# Patient Record
Sex: MALE | Race: WHITE | NOT HISPANIC OR LATINO | URBAN - NONMETROPOLITAN AREA
[De-identification: names, ages, dates, MRNs, and addresses within clinical notes are randomized per-mention and may not be internally consistent; named-entity substitution may affect disease eponyms.]

---

## 2024-09-24 ENCOUNTER — PREP FOR SURGERY (OUTPATIENT)
Dept: OTHER | Facility: HOSPITAL | Age: 69
End: 2024-09-24
Payer: MEDICARE

## 2024-09-24 ENCOUNTER — HOSPITAL ENCOUNTER (INPATIENT)
Facility: HOSPITAL | Age: 69
DRG: 065 | End: 2024-09-24
Attending: INTERNAL MEDICINE | Admitting: INTERNAL MEDICINE
Payer: MEDICARE

## 2024-09-24 PROBLEM — I63.9 CVA (CEREBRAL VASCULAR ACCIDENT): Status: ACTIVE | Noted: 2024-09-24

## 2024-09-24 LAB
GLUCOSE BLDC GLUCOMTR-MCNC: 116 MG/DL (ref 70–130)
GLUCOSE BLDC GLUCOMTR-MCNC: 197 MG/DL (ref 70–130)

## 2024-09-24 PROCEDURE — 99222 1ST HOSP IP/OBS MODERATE 55: CPT | Performed by: INTERNAL MEDICINE

## 2024-09-24 PROCEDURE — 82948 REAGENT STRIP/BLOOD GLUCOSE: CPT

## 2024-09-24 RX ORDER — AMOXICILLIN 250 MG
2 CAPSULE ORAL 2 TIMES DAILY
Status: DISCONTINUED | OUTPATIENT
Start: 2024-09-24 | End: 2024-09-27

## 2024-09-24 RX ORDER — SPIRONOLACTONE 25 MG/1
12.5 TABLET ORAL DAILY
Status: DISPENSED | OUTPATIENT
Start: 2024-09-25

## 2024-09-24 RX ORDER — NICOTINE 21 MG/24HR
1 PATCH, TRANSDERMAL 24 HOURS TRANSDERMAL
Status: CANCELLED | OUTPATIENT
Start: 2024-09-25

## 2024-09-24 RX ORDER — METOPROLOL TARTRATE 50 MG
50 TABLET ORAL EVERY 12 HOURS SCHEDULED
Status: CANCELLED | OUTPATIENT
Start: 2024-09-24

## 2024-09-24 RX ORDER — ATORVASTATIN CALCIUM 40 MG/1
40 TABLET, FILM COATED ORAL DAILY
Status: DISPENSED | OUTPATIENT
Start: 2024-09-25

## 2024-09-24 RX ORDER — AMOXICILLIN 250 MG
2 CAPSULE ORAL 2 TIMES DAILY
Status: CANCELLED | OUTPATIENT
Start: 2024-09-24

## 2024-09-24 RX ORDER — LANOLIN ALCOHOL/MO/W.PET/CERES
1000 CREAM (GRAM) TOPICAL DAILY
Status: DISPENSED | OUTPATIENT
Start: 2024-09-25

## 2024-09-24 RX ORDER — METOPROLOL TARTRATE 50 MG
50 TABLET ORAL EVERY 12 HOURS SCHEDULED
Status: DISPENSED | OUTPATIENT
Start: 2024-09-24

## 2024-09-24 RX ORDER — SPIRONOLACTONE 25 MG/1
12.5 TABLET ORAL DAILY
Status: CANCELLED | OUTPATIENT
Start: 2024-09-25

## 2024-09-24 RX ORDER — ACETAMINOPHEN 325 MG/1
650 TABLET ORAL EVERY 4 HOURS PRN
Status: DISPENSED | OUTPATIENT
Start: 2024-09-24

## 2024-09-24 RX ORDER — ASPIRIN 81 MG/1
81 TABLET ORAL DAILY
Status: CANCELLED | OUTPATIENT
Start: 2024-09-25

## 2024-09-24 RX ORDER — ATORVASTATIN CALCIUM 40 MG/1
40 TABLET, FILM COATED ORAL DAILY
Status: CANCELLED | OUTPATIENT
Start: 2024-09-25

## 2024-09-24 RX ORDER — ASPIRIN 81 MG/1
81 TABLET ORAL DAILY
Status: DISPENSED | OUTPATIENT
Start: 2024-09-25

## 2024-09-24 RX ORDER — LANOLIN ALCOHOL/MO/W.PET/CERES
1000 CREAM (GRAM) TOPICAL DAILY
Status: CANCELLED | OUTPATIENT
Start: 2024-09-25

## 2024-09-24 RX ORDER — NICOTINE 21 MG/24HR
1 PATCH, TRANSDERMAL 24 HOURS TRANSDERMAL
Status: DISPENSED | OUTPATIENT
Start: 2024-09-25

## 2024-09-24 RX ORDER — ACETAMINOPHEN 325 MG/1
650 TABLET ORAL EVERY 4 HOURS PRN
Status: CANCELLED | OUTPATIENT
Start: 2024-09-24

## 2024-09-24 RX ADMIN — APIXABAN 5 MG: 5 TABLET, FILM COATED ORAL at 20:57

## 2024-09-24 RX ADMIN — METOPROLOL TARTRATE 50 MG: 50 TABLET, FILM COATED ORAL at 20:57

## 2024-09-24 RX ADMIN — SENNOSIDES AND DOCUSATE SODIUM 2 TABLET: 50; 8.6 TABLET ORAL at 20:57

## 2024-09-24 NOTE — H&P
[facility] HOSPITALIST HISTORY AND PHYSICAL    Patient Identification:  Name:  Anshul Andrews  Age:  69 y.o.  Sex:  male  :  1955  MRN:  [id number]   Visit Number:  [id number]  Room number:  104/2S  Primary Care Physician:  Provider, No Known     Subjective     2024   Chief complaint: Follow-up on CVA      History of presenting illness:  69 y.o. male  This pt is seen today for post admission physician evaluation to the inpatient rehabilitation facility.  Patient 69-year-old male who was apparently working at his job when he suddenly got a left facial droop and quit talking, he had a left-sided weakness and was taken immediately to the hospital.  He reportedly got TNK and subsequent thrombectomy.  According to the his relative that is with him he was completely flaccid on his left side but this again coming back by the next morning and has returned overall almost back to normal at this time.  Patient was noted to have a right MCA CVA.  This was thought to be cardioembolic.  He did have some left atrial thrombus noted.  He was started on Eliquis therapy.  He was seen by speech therapy and thought to be okay for a diet.  He is currently eating [employer] and tolerating this.  Patient continued to progress medically.  Physical therapy and Occupational therapy evaluations were completed with recommended acute inpatient rehabilitation referral for continued functional mobility intervention and reeducation.  Acute rehab referral ordered for continued medical monitoring and management post hospitalization, lab monitoring, pain mgt needs, bowel/bladder care with new medication education, skin monitoring and breakdown prevention along with ongoing medical comorbidities that require ongoing care.    The preadmission mini-FIM score as assessed by the referring facility as follows: Eating 5, grooming 4, bathing 4, upper body dressing 4, lower body dressing 4, toileting 4, transfers 3, toilet transfer 3,  "walking 3, bladder management 4, bowel management 4, comprehension 7, expression 7, social interaction 7, problem-solving 7, memory 7.    ---------------------------------------------------------------------------------------------------------------------   Review of Systems:  General: No fever or chills  HEENT: No vision or hearing changes from CVA  Heart: Denies chest pain no palpitation  Lungs: He does get some shortness of breath at times, no current cough or congestion  Abdomen: No abdominal pain, he has had some bowel movements at the [facility] facility  : No trouble with urination  Musculoskeletal: He feels like he is still little \"clumsy\" with his left hand but otherwise strength he thinks is doing okay  Neuro: Denies any paresthesia  Skin: No known skin break  ---------------------------------------------------------------------------------------------------------------------   PMH:  Apparently new diagnosis of diabetes, he may have known he did have some hypertension but was not treated.  He was diagnosed with paroxysmal atrial fibrillation at the [facility], he does have these left atrial thrombi.  He is currently now on Eliquis therapy for that.  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    No known medical allergy    Social history: , he works for a [employer] company, he does smoke about 2 packs a day but is wanting to quit, he socially drinks but not even consider 1 drink a week.    Family history: Father fatal MI at 60, mother  of cancer      Social History     Socioeconomic History    Marital status: Single     ---------------------------------------------------------------------------------------------------------------------   Allergies:  Patient has no allergy information on " record.  ---------------------------------------------------------------------------------------------------------------------   Medications recommended from [facility] reviewed    Prior to Admission Medications       None          Objective     Vital Signs:  Temp:  [98.1 °F (36.7 °C)] 98.1 °F (36.7 °C)  Heart Rate:  [80] 80  Resp:  [18] 18  BP: (149)/(87) 149/87    Mean Arterial Pressure (Non-Invasive) for the past 24 hrs (Last 3 readings):   Noninvasive MAP (mmHg)   09/24/24 1444 105     SpO2:  [95 %] 95 %  on   ;   Device (Oxygen Therapy): room air  Body mass index is 29.47 kg/m².    Wt Readings from Last 3 Encounters:   09/24/24 116 kg (255 lb)      ---------------------------------------------------------------------------------------------------------------------     Physical exam:  Constitutional: 98.1, 80, 18, 149/87, room air saturation 95%.  He is in no distress.  Can speak in full sentence without difficulty, speech is understandable.  HEENT: Pupils equal approximately 3 to 4 mm and reactive extraocular movements are intact hearing intact  Neck:   No carotid bruits, neck is supple  Cardiovascular: Regular rate and rhythm no murmur rub or gallop  Pulmonary/Chest: Bilateral breath sounds mildly diminished bases otherwise clear no rhonchi rales wheezing  Abdominal:  .  Soft, benign  Musculoskeletal:  strength slightly less than the left than the right but overall good he can lift both arms above his head.  Lower extremity strength is also almost symmetric possibly slightly less strong in the left than the right, no edema is noted.  No joint effusions.  Neurological: Finger-to-nose is intact, he can oppose all fingers to his thumb on both hands.  Alert and oriented.  Skin: Warm and dry  Peripheral vascular: No clubbing or cyanosis  Genitourinary: No Gtz cath  -------------------------------------------------------------------------------------          Last echocardiogram: Reportedly SHARYN showed  severe left atrial enlargement EF of 30% multiple thrombi no PFO    --------------------------------------------------------------------------------------------------------------------  Labs: Latest labs from up there BMP glucose 116 creatinine 1.34 otherwise unremarkable, he did have also hemoglobin 18 hematocrit 55 white count of 12.4 and platelet count 279            ----------------------------------------------------------------------------------------------------------------------        Assessment & Plan    Acute right MCA CVA with some minimal left residual at this time.  Patient did have some petechial hemorrhage into this by MRI however repeat CT did not show any change in the patient was started on Eliquis.  Patient has been admitted to the acute inpatient rehab facility to undergo intensive occupational and physical therapy he will also have a speech evaluation.  Patient undergo PT 1 to 2 hours a day 5 to 6 days a week for therapeutic exercise, range of motion, endurance, gait training, safety, stairs, strengthening.  Patient undergo occupational therapy 1 to 2 hours a day 5 to 6 days a week for dressing, ADLs, feeding, home skills, safety and toileting techniques.  Patient if needed will undergo speech and language pathology 30 to 60 minutes a day 3 to 5 days a week for communication, expression, and any dysphagia/aspiration needs.  Expected functional improvement for safe discharge will be for the patient to be able to safely navigate home and community territory without injury or falls.  Be able to safely perform activities of daily living using adaptive equipment for improved functional mobility.  Be independent and safe with bowel and bladder function.  Be able to safely consume food and fluids without signs of aspiration.  Anticipate length of stay 14 days although based on the above assessment may require less.  Patient was previously independent.  His goal is to return to that level of function.   Continue Eliquis, ASA and Lipitor for further stroke prevention    Atrial fibrillation, paroxysmal, pulse is regular at this time, continue Toprol for rate control if he does go into A-fib, continue Eliquis for further stroke prevention.    Tobacco use, counseled to quit which she is going to try, NicoDerm patch ordered.    Diabetes, check A1c, monitor glucometers, continue the Jardiance at this time.    Hypertension, monitor adjust medication as needed.    HFrEF, appears compensated, continue beta-blocker, Jardiance, Aldactone.  Patient reportedly seen by cardiology at [facility] however with this new reduced EF, he may require cardiac catheterization at some point in the near future will need referral to cardiology as an outpatient.    Mildly elevated creatinine, follow recheck in AM.    Polycythemia, possibly secondary, if he were to have primary polycythemia this could raise his risk of stroke, this will need to be followed with him quitting smoking.    Cardiac thrombi, continue Eliquis    DVT prophylaxis, Eliquis will serve for this as well      VTE Prophylaxis:   [unfilled]    Falls Risk Assessment patient appears to have some incoordination minimal and some minimal weakness, status post CVA, high risk of falling        Valeriy Brandon MD  [facility]ist  09/24/24  15:34 EDT

## 2024-09-24 NOTE — PLAN OF CARE
Goal Outcome Evaluation:  Plan of Care Reviewed With: patient        Progress: no change  Outcome Evaluation: NEW ADMIT TO REHAB; FALL SAFETY INTERVENTION LEVELS EDUCATION GIVEN; PLAN OF CARE INITIATED; BED/CHAIR ALARM NOTED; WILL MONITOR

## 2024-09-24 NOTE — PLAN OF CARE
Goal Outcome Evaluation:         New admit   Initiate plan of care.

## 2024-09-25 LAB
ALBUMIN SERPL-MCNC: 3.8 G/DL (ref 3.5–5.2)
ALBUMIN/GLOB SERPL: 1.3 G/DL
ALP SERPL-CCNC: 128 U/L (ref 39–117)
ALT SERPL W P-5'-P-CCNC: 44 U/L (ref 1–41)
ANION GAP SERPL CALCULATED.3IONS-SCNC: 10.5 MMOL/L (ref 5–15)
AST SERPL-CCNC: 34 U/L (ref 1–40)
BASOPHILS # BLD AUTO: 0.1 10*3/MM3 (ref 0–0.2)
BASOPHILS NFR BLD AUTO: 0.7 % (ref 0–1.5)
BILIRUB SERPL-MCNC: 0.6 MG/DL (ref 0–1.2)
BUN SERPL-MCNC: 32 MG/DL (ref 8–23)
BUN/CREAT SERPL: 27.4 (ref 7–25)
CALCIUM SPEC-SCNC: 9.2 MG/DL (ref 8.6–10.5)
CHLORIDE SERPL-SCNC: 106 MMOL/L (ref 98–107)
CO2 SERPL-SCNC: 23.5 MMOL/L (ref 22–29)
CREAT SERPL-MCNC: 1.17 MG/DL (ref 0.76–1.27)
DEPRECATED RDW RBC AUTO: 47.5 FL (ref 37–54)
EGFRCR SERPLBLD CKD-EPI 2021: 67.9 ML/MIN/1.73
EOSINOPHIL # BLD AUTO: 0.23 10*3/MM3 (ref 0–0.4)
EOSINOPHIL NFR BLD AUTO: 1.7 % (ref 0.3–6.2)
ERYTHROCYTE [DISTWIDTH] IN BLOOD BY AUTOMATED COUNT: 15.9 % (ref 12.3–15.4)
GLOBULIN UR ELPH-MCNC: 3 GM/DL
GLUCOSE BLDC GLUCOMTR-MCNC: 105 MG/DL (ref 70–130)
GLUCOSE BLDC GLUCOMTR-MCNC: 121 MG/DL (ref 70–130)
GLUCOSE BLDC GLUCOMTR-MCNC: 90 MG/DL (ref 70–130)
GLUCOSE BLDC GLUCOMTR-MCNC: 96 MG/DL (ref 70–130)
GLUCOSE SERPL-MCNC: 91 MG/DL (ref 65–99)
HBA1C MFR BLD: 6.2 % (ref 4.8–5.6)
HCT VFR BLD AUTO: 54.1 % (ref 37.5–51)
HGB BLD-MCNC: 17 G/DL (ref 13–17.7)
IMM GRANULOCYTES # BLD AUTO: 0.08 10*3/MM3 (ref 0–0.05)
IMM GRANULOCYTES NFR BLD AUTO: 0.6 % (ref 0–0.5)
LYMPHOCYTES # BLD AUTO: 3.16 10*3/MM3 (ref 0.7–3.1)
LYMPHOCYTES NFR BLD AUTO: 23.5 % (ref 19.6–45.3)
MCH RBC QN AUTO: 26.9 PG (ref 26.6–33)
MCHC RBC AUTO-ENTMCNC: 31.4 G/DL (ref 31.5–35.7)
MCV RBC AUTO: 85.5 FL (ref 79–97)
MONOCYTES # BLD AUTO: 1.07 10*3/MM3 (ref 0.1–0.9)
MONOCYTES NFR BLD AUTO: 8 % (ref 5–12)
NEUTROPHILS NFR BLD AUTO: 65.5 % (ref 42.7–76)
NEUTROPHILS NFR BLD AUTO: 8.78 10*3/MM3 (ref 1.7–7)
NRBC BLD AUTO-RTO: 0 /100 WBC (ref 0–0.2)
PLATELET # BLD AUTO: 291 10*3/MM3 (ref 140–450)
PMV BLD AUTO: 10.5 FL (ref 6–12)
POTASSIUM SERPL-SCNC: 4.6 MMOL/L (ref 3.5–5.2)
PROT SERPL-MCNC: 6.8 G/DL (ref 6–8.5)
RBC # BLD AUTO: 6.33 10*6/MM3 (ref 4.14–5.8)
SODIUM SERPL-SCNC: 140 MMOL/L (ref 136–145)
WBC NRBC COR # BLD AUTO: 13.42 10*3/MM3 (ref 3.4–10.8)

## 2024-09-25 PROCEDURE — 97110 THERAPEUTIC EXERCISES: CPT

## 2024-09-25 PROCEDURE — 92610 EVALUATE SWALLOWING FUNCTION: CPT

## 2024-09-25 PROCEDURE — 97161 PT EVAL LOW COMPLEX 20 MIN: CPT

## 2024-09-25 PROCEDURE — 97116 GAIT TRAINING THERAPY: CPT

## 2024-09-25 PROCEDURE — 97530 THERAPEUTIC ACTIVITIES: CPT

## 2024-09-25 PROCEDURE — 92523 SPEECH SOUND LANG COMPREHEN: CPT

## 2024-09-25 PROCEDURE — 85025 COMPLETE CBC W/AUTO DIFF WBC: CPT | Performed by: INTERNAL MEDICINE

## 2024-09-25 PROCEDURE — 82948 REAGENT STRIP/BLOOD GLUCOSE: CPT

## 2024-09-25 PROCEDURE — 83036 HEMOGLOBIN GLYCOSYLATED A1C: CPT | Performed by: INTERNAL MEDICINE

## 2024-09-25 PROCEDURE — 80053 COMPREHEN METABOLIC PANEL: CPT | Performed by: INTERNAL MEDICINE

## 2024-09-25 PROCEDURE — 97535 SELF CARE MNGMENT TRAINING: CPT

## 2024-09-25 PROCEDURE — 97167 OT EVAL HIGH COMPLEX 60 MIN: CPT

## 2024-09-25 RX ADMIN — SPIRONOLACTONE 12.5 MG: 25 TABLET ORAL at 09:03

## 2024-09-25 RX ADMIN — ASPIRIN 81 MG: 81 TABLET, COATED ORAL at 09:04

## 2024-09-25 RX ADMIN — EMPAGLIFLOZIN 10 MG: 10 TABLET, FILM COATED ORAL at 09:02

## 2024-09-25 RX ADMIN — Medication 1000 MCG: at 09:02

## 2024-09-25 RX ADMIN — APIXABAN 5 MG: 5 TABLET, FILM COATED ORAL at 20:58

## 2024-09-25 RX ADMIN — METOPROLOL TARTRATE 50 MG: 50 TABLET, FILM COATED ORAL at 09:02

## 2024-09-25 RX ADMIN — SENNOSIDES AND DOCUSATE SODIUM 2 TABLET: 50; 8.6 TABLET ORAL at 09:03

## 2024-09-25 RX ADMIN — SENNOSIDES AND DOCUSATE SODIUM 2 TABLET: 50; 8.6 TABLET ORAL at 20:58

## 2024-09-25 RX ADMIN — APIXABAN 5 MG: 5 TABLET, FILM COATED ORAL at 09:03

## 2024-09-25 RX ADMIN — NICOTINE 1 PATCH: 21 PATCH TRANSDERMAL at 09:03

## 2024-09-25 RX ADMIN — ATORVASTATIN CALCIUM 40 MG: 40 TABLET, FILM COATED ORAL at 09:03

## 2024-09-25 NOTE — PROGRESS NOTES
Occupational Therapy: Individual: 100 minutes.    Physical Therapy:    Speech Language Pathology:    Signed by: Melisa Alaniz OT

## 2024-09-25 NOTE — SIGNIFICANT NOTE
09/25/24 1448   Living Environment   People in Home alone   Current Living Arrangements home   Potentially Unsafe Housing Conditions none   In the past 12 months has the electric, gas, oil, or water company threatened to shut off services in your home? No   Primary Care Provided by self   Provides Primary Care For no one   Family Caregiver if Needed child(karen), adult   Family Caregiver Names Son Yogesh Andrews   Quality of Family Relationships helpful;involved;supportive   Able to Return to Prior Arrangements no   Living Arrangement Comments Pt is a 70 Y/O male admitted to physical rehab on 9-24-24 with dx of CVA from [facility].  Spoke to pt who states being  and living alone in [place], [place].  Pt worked full-time and traveled a lot for his job.  Pt has one son Yogesh Andrews who lives in [place], [place].  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  Pt asked about being eligible for Social Security disability.  Informed pt he would need to contact Social Security Administration or go to an office to ask this question.  Informed pt there is a Social Security office in [place], [place] and SS can provide a phone number for this office if needed.  Pt says he has Medicare A&B and a supplement insurance.  Pt does not have prescription insurance.  Pt did not have a PCP prior to admission.  Pt plans to establish care with a PCP in [place] and he is agreeable to  [facility].  Pt does not have POA or living will.  Pt [facility].  Pt was independent with ADL's, mobility, driving and working prior to admission.   Resource/Environmental Concerns   Resource/Environmental Concerns none   Transportation Concerns none   Transition Planning   Patient/Family Anticipates Transition to home with family   Patient/Family Anticipated Services at Transition   (To be recommended closer to discharge date.)   Transportation Anticipated family or  friend will provide   Discharge Needs Assessment (IRF)   Current Outpatient/Agency/Support Group   (Pt did not receive home health or outpatient therapy prior to admission.)   Equipment Currently Used at Home none   Discharge Coordination/Progress Pt plans to go to son Yogesh Andrews's home at discharge.  Pt says he will be selling his home in [place] and he will be living in [place], [place].  Team conference will be held on 9-26-24.  SS will follow and assist with discharge planning.

## 2024-09-25 NOTE — PROGRESS NOTES
Inpatient Rehabilitation Functional Measures Assessment and Plan of Care    Plan of Care  Mobility    [PT] Bed/Chair/Wheelchair(Active)  Current Status(09/25/2024): SBA  Weekly Goal(09/25/2024): n/a  Discharge Goal: independent    [PT] Bed Mobility(Active)  Current Status(01/01/1753):  Weekly Goal(01/01/1753):  Discharge Goal:    [PT] Walk(Active)  Current Status(09/25/2024): 300 ft, no assistive device, SBA  Weekly Goal(09/25/2024): n/a  Discharge Goal: 300 ft, mod independent, no assistive device    [PT] Stairs(Active)  Current Status(09/25/2024): TBA  Weekly Goal(09/25/2024): n/a  Discharge Goal: 12 stairs, 2 handrails, SBA    Functional Measures  IRISH Eating:  IRISH Grooming:  IRISH Bathing:  IRISH Upper Body Dressing:  IRISH Lower Body Dressing:  IRISH Toileting:    IRISH Bladder Management  Level of Assistance:  Frequency/Number of Accidents this Shift:    IRISH Bowel Management  Level of Assistance:  Frequency/Number of Accidents this Shift:    IRISH Bed/Chair/Wheelchair Transfer:  Bed/chair/wheelchair Transfer Score = 5.  Patient is supervision/set-up for transferring to and from the  bed/chair/wheelchair, requiring: Stand by assistance. No assistive devices were  required.  IRISH Toilet Transfer:  Toilet Transfer Score = 5.  Patient is supervision/set-up  for transferring to and from the toilet/commode, requiring: Stand by assistance.  Patient requires the following assistive device(s): Grab bars. Safety frame/over  the toilet.  IRISH Tub/Shower Transfer:  Activity was not observed.    Previously Documented Mode of Locomotion at Discharge:  IRISH Expected Mode of Locomotion at Discharge: The expected mode of most  frequently used locomotion, at discharge, is expected to be walking.  IRISH Walk/Wheelchair:  WHEELCHAIR OBSERVATION   Wheelchair did not occur.    WALK OBSERVATION   Walk Distance Scale = 3.  Distance walked is greater than 150 feet. Walk Score  = 5.  Patient requires supervision or set up for walking. Stand by  assistance.  Patient walked a distance of  300 feet. No assistive devices were required.  IRISH Stairs:  Stairs did not occur.    IRISH Comprehension:  IRISH Expression:  IRISH Social Interaction:  IRISH Problem Solving:  IRISH Memory:    Therapy Mode Minutes  Occupational Therapy:  Physical Therapy: Individual: 90 minutes.  Speech Language Pathology:    Discharge Functional Goals:  Bed, Chair, Wheelchair Transfers: 7  Mode of Locomotion: W  Walk: 6  Wheelchair: 1  Stairs: 5    Signed by: Angie Cullen, Physical Therapist

## 2024-09-25 NOTE — PROGRESS NOTES
Patient Assessment Instrument  Quality Indicators - Admission FY 2023    Section A. Ethnicity/ Race/Language  Ethnicity:  Race:  Preferred Language:    Section A. Transportation      Section B. Hearing and Vision        Section B. Health Literacy        Section C. Cognitive Patterns      Section C. Signs and Symptoms of Delirium (from CAM)      Section D. Mood      Section D. Social Isolation      Section [id number]. Prior Functioning      Section EU5089. Prior Device Use      Section NR8806. Self Care Performance      Section [id number]. Self Care Discharge Goals      Section TA9470. Mobility Performance     Roll Left and Right: Patient completed the activities by themself with no  assistance from a helper.   Sit to Lying: Patient completed the activities by themself with no assistance  from a helper.   Lying to Sitting on Side of Bed: Patient completed the activities by themself  with no assistance from a helper.   Sit to Stand: [place] provides verbal cues and/or touching/steadying and/or  contact guard assistance as patient completes activity. Assistance may be  provided throughout the activity or intermittently.   Chair/Bed to Chair Transfer: [place] provides verbal cues and/or  touching/steadying and/or contact guard assistance as patient completes  activity. Assistance may be provided throughout the activity or intermittently.   Toilet Transfer [place] provides verbal cues and/or touching/steadying and/or  contact guard assistance as patient completes activity. Assistance may be  provided throughout the activity or intermittently.   Car Transfer: [place] provides verbal cues and/or touching/steadying and/or  contact guard assistance as patient completes activity. Assistance may be  provided throughout the activity or intermittently.   Walk 10 Feet:   [place] provides verbal cues and/or touching/steadying and/or  contact guard assistance as patient completes activity. Assistance may be  provided throughout the activity or  intermittently.  Walk 50 Feet with 2 Turns:   [place] provides verbal cues and/or  touching/steadying and/or contact guard assistance as patient completes  activity. Assistance may be provided throughout the activity or intermittently.  Walk 150 Feet:   [place] provides verbal cues and/or touching/steadying and/or  contact guard assistance as patient completes activity. Assistance may be  provided throughout the activity or intermittently.  Walking 10 Feet on Uneven Surfaces:   [place] provides verbal cues and/or  touching/steadying and/or contact guard assistance as patient completes  activity. Assistance may be provided throughout the activity or intermittently.  1 Step Over Curb or Up/Down Stair:   [place] provides verbal cues and/or  touching/steadying and/or contact guard assistance as patient completes  activity. Assistance may be provided throughout the activity or intermittently.  4 Steps Up and Down, With/Without Rail:   [place] provides verbal cues and/or  touching/steadying and/or contact guard assistance as patient completes  activity. Assistance may be provided throughout the activity or intermittently.  12 Steps Up and Down, With/Without Rail:   [place] provides verbal cues and/or  touching/steadying and/or contact guard assistance as patient completes  activity. Assistance may be provided throughout the activity or intermittently.  Picking up an Object:   [place] provides verbal cues and/or touching/steadying  and/or contact guard assistance as patient completes activity. Assistance may be  provided throughout the activity or intermittently.  Uses Wheelchair/Scooter: No    Section [id number]. Mobility Discharge Goals     Sit to Stand: Patient completed the activities by themself with no assistance  from a helper.   Chair/Bed to Chair Transfer: Patient completed the activities by themself with  no assistance from a helper.   Toilet Transfer: Patient completed the activities by themself with no  assistance from a  helper.   Car Transfer: Patient completed the activities by themself with no assistance  from a helper.   Walk Discharge Goals:   Walk 150 Feet: Patient completed the activities by themself with no assistance  from a helper.   12 Steps Up and Down, With/Without Rail: [place] provides verbal cues or  touching/steadying assistance as patient completes activity.    Section H. Bladder and Bowel      Section I. Active Diagnosis      Section J. Health Conditions      Section J. Health Conditions (Pain)      Section K. Swallowing/Nutritional Status    Nutritional Approaches on Admission:    Section M. Skin Conditions      Section N. Medication        Section O. Special Treatments, Procedures, and Programs    Signed by: Angie Cullen, Physical Therapist

## 2024-09-25 NOTE — PROGRESS NOTES
Patient Assessment Instrument  Quality Indicators - Admission FY 2023    Section A. Ethnicity/ Race/Language  Ethnicity:  Race:  Preferred Language:    Section A. Transportation      Section B. Hearing and Vision        Section B. Health Literacy        Section C. Cognitive Patterns      Section C. Signs and Symptoms of Delirium (from CAM)      Section D. Mood      Section D. Social Isolation      Section [id number]. Prior Functioning      Section PD1154. Prior Device Use      Section NL2393. Self Care Performance     Eating: [place] sets up or cleans up; patient completes activity. [place] assists  only prior to or following the activity.   Oral Hygiene: [place] sets up or cleans up; patient completes activity. [place]  assists only prior to or following the activity.   Toileting Hygiene: [place] does less than half the effort. [place] lifts, holds  or supports trunk or limbs but provides less than half the effort.   Shower/Bathe Self: [place] does less than half the effort. [place] lifts, holds  or supports trunk or limbs but provides less than half the effort.   Upper Body Dressing: [place] sets up or cleans up; patient completes activity.  [place] assists only prior to or following the activity.   Lower Body Dressing: [place] does less than half the effort. [place] lifts, holds  or supports trunk or limbs but provides less than half the effort.   Putting On/Taking Off Footwear: [place] does less than half the effort. [place]  lifts, holds or supports trunk or limbs but provides less than half the effort.    Section AZ1118. Self Care Discharge Goals     Eating: Patient completed the activities by themself with no assistance from a  helper.   Oral Hygiene: [place] sets up or cleans up; patient completes activity. [place]  assists only prior to or following the activity.   Toileting Hygiene: [place] provides verbal cues or touching/steadying assistance  as patient completes activity.   Shower/Bathe Self: [place] provides verbal cues or  touching/steadying assistance  as patient completes activity.   Upper Body Dressing: [place] sets up or cleans up; patient completes activity.  [place] assists only prior to or following the activity.   Lower Body Dressing: [place] provides verbal cues or touching/steadying  assistance as patient completes activity.   Putting On/Taking Off Footwear: [place] provides verbal cues or  touching/steadying assistance as patient completes activity.    Section [id number]. Mobility Performance      Section [id number]. Mobility Discharge Goals      Section H. Bladder and Bowel      Section I. Active Diagnosis      Section J. Health Conditions      Section J. Health Conditions (Pain)      Section K. Swallowing/Nutritional Status    Nutritional Approaches on Admission:    Section M. Skin Conditions      Section N. Medication        Section O. Special Treatments, Procedures, and Programs    Signed by: Melisa Alaniz OT

## 2024-09-25 NOTE — THERAPY EVALUATION
"Inpatient Rehabilitation - Speech Language Pathology Initial Evaluation  [facility]  SPEECH - LANGUAGE - COGNITIVE EVALUATION     Patient Name: Anshul Andrews  : 1955  MRN: [id number]  Today's Date: 2024     Admit Date: 2024  Anshul Andrews  was seen this am in pt room of [facility]'s IPR unit to participate in s/l and cognitive assessment for safety/function in adls. He was positioned upright in bed to cooperatively participate. All results and observations of this evaluation are felt to be representative of patient's current functional status. He has a PMH significant for HTN, DM, and tobacco abuse.      Mr Andrews was at work when he experienced sudden onset of left-sided weakness w/ facial droop and loss of speech. He was taken to OSH immediately and received TNK and subsequent thrombectomy. 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 He was reportedly cleared for po diet w/ thin liquids by SLP department of OSH.        Social History     Socioeconomic History    Marital status:    Tobacco Use    Smoking status: Every Day     Current packs/day: 2.00     Types: Cigarettes    Smokeless tobacco: Never   Vaping Use    Vaping status: Never Used   Substance and Sexual Activity    Alcohol use: Never    Drug use: Never    Sexual activity: Defer        Diet Orders (active) (From admission, onward)       Start     Ordered    24 1536  Diet: Diabetic, Regular/House; Consistent Carbohydrate; Fluid Consistency: Thin (IDDSI 0)  Diet Effective Now         24 1535                  He is observed on room air w/o complications across this evaluation.     Receptive language skills were intact. Mr Andrews was able to id common objects and personal body parts, follow simple and multi-step directives, understand complex \"wh\" questions and participate in conversational exchange w/o difficulties.    Expressive language skills were intact. He was able to complete automatic speech tasks, " "confrontation naming tasks, complete complex oe sentences, respond to complet oe \"wh\" questions, repeat at word/sentence and multiple digit levels, as well as participate in complex conversational exchange. No aphasia, anomia or verbal apraxia evidenced.    He was independently oriented to person, place, and time. Immediate, STM, and LTM were WFL w/ patient recalling recent adls and providing personal information w/o delays. Thought organization, processing, and problem solving were WFL w/patient demonstrating appropriate comparing/contrasting, understanding of idiomatic language, convergent and divergent thinking skills.    Facial/oral structures were noted w/ a trace to mild left facial weakness in the region immediately below left eye only upon observation. Oral mucosa were moist, pink and clean. Secretions were clear, thin, and controlled. OROM/DORY was WFL w/o lingual deviation upon protrusion. Sensation appears intact and is reported as intact. Speech intensity, clarity, and intelligibility were WFL w/o dysarthria or oral apraxia noted. Facial weakness has no impact on speech intelligibility.     Graphic and reading skills were intact, premorbid baseline status. He was able to id isolated letters, simple, and moderate words, as well as sentences and small paragraphs. Signature was legible.    Pragmatic skills were WFL w/ appropriate eye gaze patterns and visual tracking. Language was appropriate in context w/ topic initiation and maintenance independently. No neglect evidenced. Humor response was intact w/ appropriate affect.    Visit Dx:  No diagnosis found.  Patient Active Problem List   Diagnosis    CVA (cerebral vascular accident)     History reviewed. No pertinent past medical history.  History reviewed. No pertinent surgical history.    Impression:      Mr Andrews presents with speech, language, and cognitive skills representative of his premorbid baseline function.     SLP Recommendation and Plan     No " further formal SLP f/u recommended for s/l and cognitive skills. Patient will participate in further clinical dysphagia assessment. Please see full dysphagia note for details.    D/w patient results and recommendations w/ verbal understanding and agreement.    D/w RN results and recommendations w/ verbal understanding and agreement.     Thank you for allowing me to participate in the care of your patient-  Carey Coleman M.S., CCC-SLP        SLP EVALUATION (Last 72 Hours)       SLP SLC Evaluation       Row Name 09/25/24 1015                   Communication Assessment/Intervention    Document Type evaluation  -JR           Comprehension Assessment/Intervention    Comprehension Assessment/Intervention Auditory Comprehension;Reading Comprehension  -JR           Auditory Comprehension Assessment/Intervention    Auditory Comprehension (Communication) WFL  -JR        Able to Identify Objects/Pictures (Communication) WFL  -JR        Answers Questions (Communication) WFL  -JR        Able to Follow Commands (Communication) WFL  -JR        Narrative Discourse WFL  -JR           Reading Comprehension Assessment/Intervention    Reading Comprehension (Communication) WFL  -JR        Scanning (Reading) WFL  -JR        Visual Matching Ability (Reading) WFL  -JR        Single Word Level WFL  -JR        Phrase Level WFL  -JR        Functional Reading Tasks WFL  -JR           Expression Assessment/Intervention    Expression Assessment/Intervention verbal expression;graphic expression  -JR           Verbal Expression Assessment/Intervention    Verbal Expression WFL  -JR        Automatic Speech (Communication) WFL  -JR        Repetition WFL  -JR        Phrase Completion WFL  -JR        Responsive Naming WFL  -JR        Confrontational Naming WFL  -JR        Spontaneous/Functional Words WFL  -JR        Sentence Formulation WFL  -JR        Conversational Discourse/Fluency WFL  -JR           Graphic Expression Assessment/Intervention     Graphic Expression L  -JR        Graphic Expression to Dictation [facility]  -JR        Biographical Information L  -JR        Functional Correspondence WFL  -           Oral Motor Structure and Function    Oral Motor Structure and Function L  -JR        Mucosal Quality moist, healthy  -           Oral Musculature and Cranial Nerve Assessment    Oral Motor General Assessment WFL  -JR           Motor Speech Assessment/Intervention    Motor Speech Function WFL  -JR        Initiation of Phonation (Communication) [facility]  -JR        Automatic Speech (Communication) [facility]  -JR        Verbal Repetition (Communication) [facility]  -JR        Conversational Speech (Communication) [facility]  -        Speech intelligibility 100%;with unfamiliar listener  -                  User Key  (r) = Recorded By, (t) = Taken By, (c) = Cosigned By      Initials Name Effective Dates    Carey Small MS CCC-SLP 10/25/21 -                    EDUCATION  The patient has been educated in the following areas:     Cognitive Impairment Communication Impairment.      Time Calculation:      Time Calculation- SLP       Row Name 09/25/24 1109             Time Calculation- SLP    SLP Start Time 1000  -      SLP Stop Time 1030  -      SLP Time Calculation (min) 30 min  -      SLP Received On 09/25/24  -                User Key  (r) = Recorded By, (t) = Taken By, (c) = Cosigned By      Initials Name Provider Type    Carey Small MS CCC-SLP Speech and Language Pathologist                    Therapy Charges for Today       Code Description Service Date Service Provider Modifiers Qty    48108351460 HC ST EVAL ORAL PHARYNG SWALLOW 1 9/25/2024 Carey Coleman MS CCC-SLP GN 1    [id number] HC ST EVAL SPEECH AND PROD W LANG  1 9/25/2024 Carey Coleman MS CCC-SLP GN 1            MS ALYSSA Enriquez  9/25/2024

## 2024-09-25 NOTE — THERAPY EVALUATION
Inpatient Rehabilitation - Speech Language Pathology   Swallow Initial Evaluation [facility]  CLINICAL DYSPHAGIA ASSESSMENT     Patient Name: Anshul Andrews  : 1955  MRN: [id number]  Today's Date: 2024     Admit Date: 2024  Anshul Andrews  was seen at bedside this am on [facility]'s IPR unit to assess safety/efficacy of swallowing fnx, determine safest/least restrictive diet tolerance. He has a PMH significant for HTN, DM, and tobacco abuse.     Mr Andrews was at work when he experienced sudden onset of left-sided weakness w/ facial droop and loss of speech. He was taken to OSH immediately and received TNK and subsequent thrombectomy. 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 He was reportedly cleared for po diet w/ thin liquids by SLP department of OSH.     He is currently tolerating a least restrictive po diet of regular textures and thin liquids. He reports no difficulty w/ any po intake since initial assessment by SLP at OSH.     Social History     Socioeconomic History    Marital status:    Tobacco Use    Smoking status: Every Day     Current packs/day: 2.00     Types: Cigarettes    Smokeless tobacco: Never   Vaping Use    Vaping status: Never Used   Substance and Sexual Activity    Alcohol use: Never    Drug use: Never    Sexual activity: Defer      Imaging:  No imaging is available for review.     Labs:   Latest Reference Range & Units 24 00:10   WBC 3.40 - 10.80 10*3/mm3 13.42 (H)   RBC 4.14 - 5.80 10*6/mm3 6.33 (H)   Hemoglobin 13.0 - 17.7 g/dL 17.0   Hematocrit 37.5 - 51.0 % 54.1 (H)   Platelets 140 - 450 10*3/mm3 291   RDW 12.3 - 15.4 % 15.9 (H)   MCV 79.0 - 97.0 fL 85.5   MCH 26.6 - 33.0 pg 26.9   MCHC 31.5 - 35.7 g/dL 31.4 (L)   MPV 6.0 - 12.0 fL 10.5   RDW-SD 37.0 - 54.0 fl 47.5   (H): Data is abnormally high  (L): Data is abnormally low    Diet Orders (active) (From admission, onward)       Start     Ordered    24 1536  Diet: Diabetic, Regular/House;  Consistent Carbohydrate; Fluid Consistency: Thin (IDDSI 0)  Diet Effective Now         09/24/24 0885                  He is observed on room air w/o complications across this assessment.     He was positioned upright and centered in bed to accept multiple po presentations of ice chips, solid cracker, puree, and thin liquids via spoon, cup, and straw.  He was able to self provide po trials.     Facial/oral structures were noted w/ a trace to mild left facial weakness in the region immediately below left eye only upon observation. This does not extend to labial edges w/ no weakness noted at left oral commissure and no drooping of labial edges noted. Lingual protrusion revealed no deviation. Oral mucosa were moist, pink, and clean. Secretions were clear, thin, and well controlled. OROM/DORY was wfl to imitate oral postures. Gag is not assessed. Volitional cough was intact w/ adequate  intensity, clear in quality, non-productive. Voice was adequate in intensity, clear in quality w/ intelligible speech.    Upon po presentations, adequate bolus anticipation and acceptance w/ good labial seal for bolus clearance via spoon bowl, cup rim stability and suction via straw. Bolus formation, manipulation and control were wfl w/ rotary mastication pattern. A-p transit was timely w/o significant oral residue appreciated. No overt s/s aspiration before the swallow. Left facial weakness does not impact swallow function.      Pharyngeal swallow was timely w/ adequate hyolaryngeal elevation per palpation. No overt s/s aspiration evidenced across this evaluation. No silent aspiration suspected. Patient denied odynophagia.    Visit Dx:   No diagnosis found.  Patient Active Problem List   Diagnosis    CVA (cerebral vascular accident)     History reviewed. No pertinent past medical history.  History reviewed. No pertinent surgical history.    Impression:     Mr Andrews presented w/ a wfl oropharyngeal swallow w/o s/s aspiration and no s/s  indicative of silent aspiration. He additionally was noted w/ no functional impacts from trace to mild left facial weakness. No odynophagia reported.     He is felt to most benefit from a least restrictive po diet of regular textures and thin liquids w/ medications whole in puree/thins per pt preference.     SLP Recommendation and Plan    1. Regular textures, thin liquids.    2. Medications whole in puree/thins. Per pt preference.   3. Upright and centered for all po intake  4. APRYL precautions.  5. Oral care protocol.  6. Universal aspiration precautions.     No further formal SLP f/u warranted/recommended at this time.    D/w patient results and recommendations w/ verbal agreement.    D/w RN results and recommendations w/ verbal agreement.    Thank you for allowing me to participate in the care of your patient-  Carey Coleman M.S., CCC-SLP     SLP Diet Recommendation: regular textures, thin liquids (09/25/24 1000)     SLP Rec. for Method of Medication Administration: meds whole, with thin liquids (09/25/24 1000)          SWALLOW EVALUATION (Last 72 Hours)       SLP Adult Swallow Evaluation       Row Name 09/25/24 1000                   Rehab Evaluation    Document Type evaluation  -JR           Oral Musculature and Cranial Nerve Assessment    Oral Motor General Assessment WFL  -JR        Oral Motor, Comment --  WFL  -JR           General Eating/Swallowing Observations    Respiratory Support Currently in Use room air  -JR        Eating/Swallowing Skills self-fed  -JR        Positioning During Eating upright in bed  -JR        Utensils Used spoon;cup;straw  -JR        Consistencies Trialed regular textures;pureed;thin liquids  -JR           Clinical Swallow Eval    Oral Prep Phase WFL  -JR        Oral Transit WFL  -JR        Oral Residue WFL  -JR        Pharyngeal Phase no overt signs/symptoms of pharyngeal impairment  -JR        Clinical Swallow Evaluation Summary --  WFL  -JR           Recommendations    SLP Diet  Recommendation regular textures;thin liquids  -[facility] Rec. for Method of Medication Administration meds whole;with thin liquids  -                  User Key  (r) = Recorded By, (t) = Taken By, (c) = Cosigned By      Initials Name Effective Dates    Carey Small MS CCC-SLP 10/25/21 -                     EDUCATION  The patient has been educated in the following areas:   Dysphagia (Swallowing Impairment).        Time Calculation:    Time Calculation- SLP       Row Name 09/25/24 1109             Time Calculation- [facility]    SLP Start Time 1000  -      SLP Stop Time 1030  -JR      SLP Time Calculation (min) 30 min  -      SLP Received On 09/25/24  -                User Key  (r) = Recorded By, (t) = Taken By, (c) = Cosigned By      Initials Name Provider Type    Carey Small MS CCC-SLP Speech and Language Pathologist                    Therapy Charges for Today       Code Description Service Date Service Provider Modifiers Qty    07651764538 HC ST EVAL ORAL PHARYNG SWALLOW 1 9/25/2024 Carey Coleman MS CCC-SLP GN 1    83331021294 HC ST EVAL SPEECH AND PROD W LANG  1 9/25/2024 Carey Coleman MS CCC-SLP GN 1                 MS ALYSSA Enriquez  9/25/2024

## 2024-09-25 NOTE — PROGRESS NOTES
Inpatient Rehabilitation Functional Measures Assessment and Plan of Care    Plan of Care   Self Care    Toileting (Active)  Current Status (9/25/2024 8:15:00 AM): Neftali/CGA  Weekly Goal: CGA  Discharge Goal: supervision    Functional Measures  IRISH Eating:  IRISH Grooming:  IRISH Bathing:  IRISH Upper Body Dressing:  IRISH Lower Body Dressing:  IRISH Toileting:    IRISH Bladder Management  Level of Assistance:  Frequency/Number of Accidents this Shift:    IRISH Bowel Management  Level of Assistance:  Frequency/Number of Accidents this Shift:    IRISH Bed/Chair/Wheelchair Transfer:  IRISH Toilet Transfer:  IRISH Tub/Shower Transfer:    Previously Documented Mode of Locomotion at Discharge:  [facility] Expected Mode of Locomotion at Discharge:  IRISH Walk/Wheelchair:  IRISH Stairs:    IRISH Comprehension:  IRISH Expression:  IRISH Social Interaction:  IRISH Problem Solving:  IRISH Memory:    Therapy Mode Minutes  Occupational Therapy:  Physical Therapy:  Speech Language Pathology:    Discharge Functional Goals:    Signed by: Melisa Alaniz OT     no

## 2024-09-25 NOTE — PROGRESS NOTES
Patient without new complaints, he seems to be tolerating his current diet, he is to have PT and OT assessments today.  I did discuss with him, with his reduced EF, he will most likely need a heart cath in the future and I will refer him to cardiology as an outpatient.  He is in agreement with this.  Blood pressures are controlled, there are hold parameters on his medication.  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  A1c noted at 6.2.  Again encouraged to quit smoking.

## 2024-09-25 NOTE — THERAPY EVALUATION
Inpatient Rehabilitation - Occupational Therapy Initial Evaluation and Treatment Note    RAMIRO Thom     Patient Name: Anshul Andrews  : 1955  MRN: [id number]    Today's Date: 2024                 Admit Date: 2024       No diagnosis found.    Patient Active Problem List   Diagnosis    CVA (cerebral vascular accident)       History reviewed. No pertinent past medical history.    History reviewed. No pertinent surgical history.          IRF OT ASSESSMENT FLOWSHEET (Last 12 Hours)       IRF OT Evaluation and Treatment       Row Name 24 0821          OT Time and Intention    Document Type initial evaluation;daily treatment  -TM     Mode of Treatment occupational therapy  -TM     Patient Effort adequate  -TM     Symptoms Noted During/After Treatment none  -TM       Row Name 24 0821          General Information    Patient Profile Reviewed yes  -TM     General Observations of Patient Pt agreeable for therapy.  -TM     Existing Precautions/Restrictions fall  -TM       Row Name 24 0821          Previous Level of Function/Home Environm    BADLs, Premorbid Functional Level independent  -TM     IADLs, Premorbid Functional Level independent  -TM       Row Name 24 0821          Living Environment    Current Living Arrangements home  -TM     Primary Care Provided by self  -TM       Row Name 24 0821          Home Use of Assistive/Adaptive Equipment    Equipment Currently Used at Home none  -TM       Row Name 24 0821          Cognition/Psychosocial    Orientation Status (Cognition) oriented to;person;place;situation  -TM     Follows Commands (Cognition) follows one-step commands;verbal cues/prompting required  -TM       Row Name 24 0821          Range of Motion Comprehensive    Comment, General Range of Motion BUE WFL; pt reported he had RUE shoulder surgery years ago.  -TM       Row Name 24 0821          Strength (Manual Muscle Testing)    Left Hand, Setting 1  (Dynamometer Testing) 65 pounds  -TM     Right Hand, Setting 1 (Dynamometer Testing) 70 pounds  -TM       Row Name 09/25/24 0821          Strength Comprehensive (MMT)    Comment, General Manual Muscle Testing (MMT) Assessment RUE 3+/5; LUE 4+/5; pt reported decreased RUE strength since RUE shoulder surgery years ago.  -TM       Row Name 09/25/24 0821          Hearing Assessment    Hearing Status WFL  -TM       Row Name 09/25/24 0821          Vision Assessment/Intervention    Visual Impairment/Limitations WFL;other (see comments)  reading glasses  -TM       Row Name 09/25/24 0821          Sensory Assessment (Somatosensory)    Sensory Assessment (Somatosensory) UE sensation intact  -TM       Row Name 09/25/24 0821          Bathing    Comment (Bathing) Neftali  -TM       Row Name 09/25/24 0821          Upper Body Dressing    [place] Level (Upper Body Dressing) set up assistance  -TM     Position (Upper Body Dressing) edge of bed sitting  -TM       Row Name 09/25/24 0821          Lower Body Dressing    [place] Level (Lower Body Dressing) minimum assist (75% patient effort);verbal cues  -TM     Position (Lower Body Dressing) edge of bed sitting  -TM       Row Name 09/25/24 0821          Grooming    [place] Level (Grooming) set up  -TM     Position (Grooming) supported sitting  -TM       Row Name 09/25/24 0821          Toileting    [place] Level (Toileting) contact guard assist;minimum assist (75% patient effort);verbal cues  -TM     Assistive Device Use (Toileting) grab bar/safety frame  -TM     Position (Toileting) supported sitting  -TM       Row Name 09/25/24 0821          Self-Feeding    [place] Level (Self-Feeding) set up  -TM     Position (Self-Feeding) supported sitting  -TM       Row Name 09/25/24 0821          Transfer Assessment/Treatment    Transfers bed-chair transfer;toilet transfer  -TM       Row Name 09/25/24 0821          Bed-Chair Transfer    Bed-Chair [place] (Transfers)  contact guard;verbal cues  -TM     Assistive Device (Bed-Chair Transfers) wheelchair  -TM       Row Name 09/25/24 0821          Toilet Transfer    [place] Level (Toilet Transfer) contact guard;verbal cues  -TM     Assistive Device (Toilet Transfer) wheelchair  -TM       Row Name 09/25/24 0821          Motor Skills    Motor Skills coordination;functional endurance;motor control/coordination interventions  -TM     Coordination other (see comments)  box n blocks: R hand 39 blocks per 1 minute, L hand 32 blocks per 1 minute  -TM     Motor Control/Coordination Interventions therapeutic exercise/ROM;fine motor manipulation/dexterity activities;gross motor coordination activities;other (see comments)  BUE ther ex/act, GMC/FMC  -TM       Row Name 09/25/24 0821          Therapy Assessment/Plan (OT)    Patient's Goals For Discharge return home  -       Row Name 09/25/24 0821          Therapy Assessment/Plan (OT)    OT Diagnosis Stroke (right sided MCA M2 occlusion, left sided weakness, AFIB)  -TM     Rehab Potential/Prognosis (OT) adequate, monitor progress closely  -     Frequency of Treatment (OT) 5 times per week  -TM     Estimated Duration of Therapy (OT) 2 weeks;1 week  -TM     Problem List (OT) other (see comments)  decreased ADLs, strength, fxal mobility, coordination  -TM     Planned Therapy Interventions (OT) activity tolerance training;adaptive equipment training;BADL retraining;IADL retraining;neuromuscular control/coordination retraining;occupation/activity based interventions;patient/caregiver education/training;ROM/therapeutic exercise;strengthening exercise;transfer/mobility retraining  -       Row Name 09/25/24 0821          IRF OT Goals    LB Dressing Goal Selection (OT-IRF) LB dressing, OT goal 1;LB dressing, OT goal 2  -TM     Toileting Goal Selection (OT-IRF) toileting, OT goal 1;toileting, OT goal 2  -TM       Row Name 09/25/24 0821          LB Dressing Goal 1 (OT-IRF)    [place] (LB  Dressing Goal 1, OT-IRF) contact guard required  -TM     Time Frame (LB Dressing Goal 1, OT-IRF) short-term goal (STG)  -TM       Row Name 09/25/24 0821          LB Dressing Goal 2 (OT-IRF)    [place] (LB Dressing Goal 2, OT-IRF) supervision required  -TM     Time Frame (LB Dressing Goal 2, OT-IRF) long-term goal (LTG);by discharge  -TM       Row Name 09/25/24 0821          Toileting Goal 1 (OT-IRF)    [place] Level (Toileting Goal 1, OT-IRF) contact guard required  -TM     Time Frame (Toileting Goal 1, OT-IRF) short-term goal (STG)  -TM       Row Name 09/25/24 0821          Toileting Goal 2 (OT-IRF)    [place] Level (Toileting Goal 2, OT-IRF) supervision required  -TM     Time Frame (Toileting Goal 2, OT-IRF) long-term goal (LTG);by discharge  -TM               User Key  (r) = Recorded By, (t) = Taken By, (c) = Cosigned By      Initials Name Effective Dates     Melisa Alaniz OT 06/16/21 -                      Occupational Therapy Education       Title: PT OT SLP Therapies (Done)       Topic: Occupational Therapy (Done)       Point: ADL training (Done)       Description:   Instruct learner(s) on proper safety adaptation and remediation techniques during self care or transfers.   Instruct in proper use of assistive devices.                  Learning Progress Summary             Patient Acceptance, E,D, VU,NR by  at 9/25/2024 1018                         Point: Precautions (Done)       Description:   Instruct learner(s) on prescribed precautions during self-care and functional transfers.                  Learning Progress Summary             Patient Acceptance, E,D, VU,NR by  at 9/25/2024 1018                                         User Key       Initials Effective Dates Name Provider Type Discipline     06/16/21 -  Melisa Alaniz, OT Occupational Therapist OT                        OT Recommendation and Plan    Planned Therapy Interventions (OT): activity tolerance training,  adaptive equipment training, BADL retraining, IADL retraining, neuromuscular control/coordination retraining, occupation/activity based interventions, patient/caregiver education/training, ROM/therapeutic exercise, strengthening exercise, transfer/mobility retraining                    Time Calculation:      Time Calculation- OT       Row Name 09/25/24 1018             Time Calculation- OT    OT Start Time 0815  -TM      OT Stop Time 0955  -TM      OT Time Calculation (min) 100 min  -TM      Total Timed Code Minutes-  minute(s)  -TM      OT Non-Billable Time (min) 60 min  -TM                User Key  (r) = Recorded By, (t) = Taken By, (c) = Cosigned By      Initials Name Provider Type    TM Melisa Alaniz OT Occupational Therapist                  Therapy Charges for Today       Code Description Service Date Service Provider Modifiers Qty    22402207875 HC OT EVAL HIGH COMPLEXITY 3 9/25/2024 Melisa Alaniz OT GO 1    37418680113 HC OT SELF CARE/MGMT/TRAIN EA 15 MIN 9/25/2024 Melisa Alaniz OT GO 2    84538826546 HC OT THERAPEUTIC ACT EA 15 MIN 9/25/2024 Melisa Alaniz OT GO 2                     Melisa Alaniz OT  9/25/2024

## 2024-09-25 NOTE — PROGRESS NOTES
Case Management  Inpatient Rehabilitation Plan of Care and Discharge Plan Note    Rehabilitation Diagnosis:  CVA  Date of Onset:  9-13-24    Medical Summary:  right sided MCA M2 occlusion, left sided weakness, AFib    Plan of Care      Expected Intensity:  Average of 3 hours of therapy 5 days/week.  Interdisciplinary Team:  Interdisciplinary Team: Medical Supervision and 24 Hour Rehabilitation Nursing.,  Physical Therapy:, Occupational Therapy:, Social Work, Therapeutic Recreation.  Physical Therapy Intensity/Duration: PT 1.5 hours per day/5 days per week  Occupational Therapy Intensity/Duration: OT 1.5 hours per day/5 days per week  Estimated Length of Stay/Anticipated Discharge Date: 14 days  Anticipated Discharge Destination:  Anticipated discharge destination from inpatient rehabilitation is community  discharge with assistance. Pt plans to go home with son at discharge who lives  in [place], [place].      Based on the patient's medical and functional status, their prognosis and  expected level of functional improvement is:  good    Signed by: MIGUEL Collins

## 2024-09-25 NOTE — THERAPY EVALUATION
Inpatient Rehabilitation - Physical Therapy Initial Evaluation        Thom     Patient Name: Anshul Andrews  : 1955  MRN: [id number]    Today's Date: 2024                    Admit Date: 2024      Visit Dx:   No diagnosis found.    Patient Active Problem List   Diagnosis    CVA (cerebral vascular accident)       History reviewed. No pertinent past medical history.    History reviewed. No pertinent surgical history.    PT ASSESSMENT (Last 12 Hours)       IRF PT Evaluation and Treatment       Row Name 24 1502          PT Time and Intention    Document Type daily treatment;initial evaluation  -     Mode of Treatment physical therapy  -       Row Name 24 1502          General Information    Patient Profile Reviewed yes  -AG     General Observations of Patient pt. supine in bed prior to evaluation.  Pt. is pleasant, cooperative, agreeable to participation. Seems motivated to participate and return home to [facility]  -     Existing Precautions/Restrictions fall  -AG     Comment, General Information pt. admitted to IRF following acute R MCA CVA, received TNK and underwent thrombectomy  -       Row Name 24 1502          Previous Level of Function/Home Environm    BADLs, Premorbid Functional Level independent  -AG     Community Ambulation, Premorbid Functional Level independent  -AG     Previous Level of Function, Premorbid pt. previously very active without assistive device or any use of DME/ AE.  Pt. employed as  and travels for work frequently.  -       Row Name 24 1502          Living Environment    Current Living Arrangements home  -     Home Accessibility --  pt. plans to live with his son for a brief time after d/c.  -     People in Home alone  -     Primary Care Provided by self  -       Row Name 24 1502          Home Use of Assistive/Adaptive Equipment    Equipment Currently Used at Home none  -       Row Name 24 1502          Pain  Assessment    Pre/Posttreatment Pain Comment chronic, intermittent L knee pain d/t old injury; hx arthroscopy  -     Additional Documentation Pain Scale: FACES Pre/Post-Treatment (Group)  -AG       Row Name 09/25/24 1502          Pain Scale: FACES Pre/Post-Treatment    Pain: FACES Scale, Pretreatment 0-->no hurt  -AG     Posttreatment Pain Rating 2-->hurts little bit  -AG       Row Name 09/25/24 1502          Cognition/Psychosocial    Orientation Status (Cognition) oriented x 4  -AG     Follows Commands (Cognition) L  -AG       Row Name 09/25/24 1502          Range of Motion Comprehensive    General Range of Motion --  R shoulder ~75% of normal AROM; L shoulder, B elbows, wrists, hands WNL; L knee flexion is limited to ~90 degrees and extension lacks ~10 degrees.  R LE AROM WFL  -AG       Row Name 09/25/24 1502          Strength Comprehensive (MMT)    General Manual Muscle Testing (MMT) Assessment --  B LE 4+/5  -AG       Row Name 09/25/24 1502          Hearing Assessment    Hearing Status WFL  -AG       Row Name 09/25/24 1502          Vision Assessment/Intervention    Visual Impairment/Limitations WFL  -AG       Row Name 09/25/24 1502          Sensory Assessment (Somatosensory)    Sensory Assessment (Somatosensory) LE sensation intact  -AG       Row Name 09/25/24 1502          Bed Mobility    Bed Mobility bed mobility (all) activities  -     All Activities, [place] (Bed Mobility) independent  -AG       Row Name 09/25/24 1502          Transfer Assessment/Treatment    Transfers sit-stand transfer;stand-sit transfer;bed-chair transfer;chair-bed transfer;stand pivot/stand step transfer;toilet transfer  -AG       Row Name 09/25/24 1502          Bed-Chair Transfer    Bed-Chair [place] (Transfers) standby assist;verbal cues  -     Assistive Device (Bed-Chair Transfers) wheelchair  -AG       Row Name 09/25/24 1502          Chair-Bed Transfer    Chair-Bed [place] (Transfers) standby assist;verbal cues   -AG     Assistive Device (Chair-Bed Transfers) wheelchair  -AG       Row Name 09/25/24 1502          Sit-Stand Transfer    Sit-Stand [place] (Transfers) standby assist  -AG       Row Name 09/25/24 1502          Stand-Sit Transfer    Stand-Sit [place] (Transfers) standby assist  -AG       Row Name 09/25/24 1502          Stand Pivot/Stand Step Transfer    Stand Pivot/Stand Step [place] (Transfers) standby assist  -AG       Row Name 09/25/24 1502          Toilet Transfer    Type (Toilet Transfer) stand pivot/stand step  -AG     [place] Level (Toilet Transfer) standby assist;verbal cues  -AG     Assistive Device (Toilet Transfer) commode;grab bars/safety frame;raised toilet seat  -AG       Row Name 09/25/24 1502          Gait/Stairs (Locomotion)    Gait/Stairs Locomotion gait/ambulation independence;gait/ambulation assistive device;distance ambulated;gait pattern;gait deviations  -AG     [place] Level (Gait) standby assist  -AG     Assistive Device (Gait) --  none  -AG     Patient was able to Ambulate yes  -AG     Distance in Feet (Gait) 300  -AG     Pattern (Gait) step-through  -AG     Deviations/Abnormal Patterns (Gait) --  2 instances of scissoring w/ self correction; B knees remain slightly flexed; flexed trunk/ posture  -AG       Row Name 09/25/24 1502          Safety Issues, Functional Mobility    Impairments Affecting Function (Mobility) balance;coordination;endurance/activity tolerance  -AG       Row Name 09/25/24 1502          Balance    Balance Assessment sitting static balance;sitting dynamic balance;sit to stand dynamic balance;standing static balance;standing dynamic balance  -AG     Static Sitting Balance independent  -AG     Position, Sitting Balance unsupported;sitting edge of bed  -AG     Static Standing Balance standby assist  -AG     Dynamic Standing Balance standby assist  -AG     Position/Device Used, Standing Balance --  none  -AG     Balance Interventions  standing;static;dynamic;bosu ball;foam;narrowed base of support;tandem gait  min A to maintain static standing balance unsupported on BOSU  -[place] Name 09/25/24 1502          Motor Skills    Motor Skills coordination;functional endurance;motor control/coordination interventions;neuro-muscular function  -     Coordination --  B LE gross motor coordination WFL  -     Motor Control/Coordination Interventions stepping/walking;therapeutic exercise/ROM  -     Therapeutic Exercise hip;knee;ankle  -AG       Row Name 09/25/24 1502          Hip (Therapeutic Exercise)    Hip (Therapeutic Exercise) strengthening exercise  -     Hip Strengthening (Therapeutic Exercise) bilateral;flexion;extension;aBduction;aDduction;marching while standing;marching while seated;mini squats;sitting;standing;3 lb free weight;resistance band;green  -AG       Row Name 09/25/24 1502          Knee (Therapeutic Exercise)    Knee (Therapeutic Exercise) strengthening exercise  -     Knee Strengthening (Therapeutic Exercise) bilateral;flexion;extension;marching while seated;marching while standing;LAQ (long arc quad);hamstring curls;sitting;standing;3 lb free weight;resistance band;green  -AG       Row Name 09/25/24 1502          Ankle (Therapeutic Exercise)    Ankle (Therapeutic Exercise) strengthening exercise  -     Ankle Strengthening (Therapeutic Exercise) bilateral;dorsiflexion;plantarflexion;sitting;standing  -AG       Row Name 09/25/24 1502          Positioning and Restraints    Pre-Treatment Position in bed  -     Post Treatment Position bed  -AG     In Bed sitting EOB;call light within reach;encouraged to call for assist  -AG       Row Name 09/25/24 1502          Therapy Assessment/Plan (PT)    Patient's Goals For Discharge return to all previous roles/activities  -AG       Row Name 09/25/24 1502          Therapy Assessment/Plan (PT)    Functional Level at Time of Evaluation (PT) SBA  -     PT Diagnosis (PT) decr balance  and gait safety  -AG     Rehab Potential/Prognosis (PT) good, to achieve stated therapy goals  -AG     Frequency of Treatment (PT) 5 times per week  -AG     Estimated Duration of Therapy (PT) 1 week  -AG     Problem List (PT) problems related to;balance;coordination;mobility;range of motion (ROM);strength  -AG     Activity Limitations Related to Problem List (PT) unable to ambulate safely;unable to transfer safely;BADLs not performed adequately or safely  -AG       Row Name 09/25/24 1502          Therapy Plan Review/Discharge Plan (PT)    Therapy Plan Review (PT) evaluation/treatment results reviewed;care plan/treatment goals reviewed;risks/benefits reviewed;current/potential barriers reviewed;participants voiced agreement with care plan;participants included;patient  -AG     Anticipated Equipment Needs at Discharge (PT Eval) --  none  -AG     Anticipated Discharge Disposition (PT) home with outpatient therapy services  -AG       Row Name 09/25/24 1502          IRF PT Goals    Transfer Goal Selection (PT-IRF) transfers, PT goal 1  -AG     Gait (Walking Locomotion) Goal Selection (PT-IRF) gait, PT goal 1  -AG     Stairs Goal Selection (PT-IRF) stairs, PT goal 1  -AG       Row Name 09/25/24 1502          Transfer Goal 1 (PT-IRF)    Activity/Assistive Device (Transfer Goal 1, PT-IRF) sit-to-stand/stand-to-sit;bed-to-chair/chair-to-bed;toilet;car transfer  -AG     [place] Level (Transfer Goal 1, PT-IRF) independent  -AG     Time Frame (Transfer Goal 1, PT-IRF) by discharge  -AG       Row Name 09/25/24 1502          Gait/Walking Locomotion Goal 1 (PT-IRF)    Activity/Assistive Device (Gait/Walking Locomotion Goal 1, PT-IRF) gait (walking locomotion);improve balance and speed;increase endurance/gait distance;diminish gait deviation  -AG     Gait/Walking Locomotion Distance Goal 1 (PT-IRF) 300  -AG     [place] Level (Gait/Walking Locomotion Goal 1, PT-IRF) modified independence  -AG     Time Frame (Gait/Walking  Locomotion Goal 1, PT-IRF) by discharge  -AG       Row Name 09/25/24 1502          Stairs Goal 1 (PT-IRF)    Activity/Assistive Device (Stairs Goal 1, PT-IRF) step-over step;using handrail, right;using handrail, left;ascending stairs;descending stairs  -AG     Number of Stairs (Stairs Goal 1, PT-IRF) 12  -AG     [place] Level (Stairs Goal 1, PT-IRF) standby assist  -AG     Time Frame (Stairs Goal 1, PT-IRF) by discharge  -               User Key  (r) = Recorded By, (t) = Taken By, (c) = Cosigned By      Initials Name Provider Type     Angie Cullen, PT Physical Therapist                     Physical Therapy Education       Title: PT OT SLP Therapies (Done)       Topic: Physical Therapy (Done)       Point: Mobility training (Done)       Learning Progress Summary             Patient Acceptance, E,D, VU,NR by  at 9/25/2024 1502                         Point: Home exercise program (Done)       Learning Progress Summary             Patient Acceptance, E,D, VU,NR by  at 9/25/2024 1502                         Point: Body mechanics (Done)       Learning Progress Summary             Patient Acceptance, E,D, VU,NR by  at 9/25/2024 1502                         Point: Precautions (Done)       Learning Progress Summary             Patient Acceptance, E,D, VU,NR by  at 9/25/2024 1502                                         User Key       Initials Effective Dates Name Provider Type [facility] 06/16/21 -  Angie Cullen, PT Physical Therapist PT                    PT Recommendation and Plan    Planned Therapy Interventions (PT): balance training, gait training, home exercise program, motor coordination training, neuromuscular re-education, patient/family education, postural re-education, strengthening, transfer training  Frequency of Treatment (PT): 5 times per week  Anticipated Equipment Needs at Discharge (PT Eval):  (none)                  Time Calculation:      PT Charges       Row Name 09/25/24 150              Time Calculation    PT Received On 09/25/24  -      PT Goal Re-Cert Due Date 10/02/24  -                User Key  (r) = Recorded By, (t) = Taken By, (c) = Cosigned By      Initials Name Provider Type    Angie Aceves, PT Physical Therapist                    Therapy Charges for Today       Code Description Service Date Service Provider Modifiers Qty    36576535034 HC PT EVAL LOW COMPLEXITY 3 9/25/2024 Angie Cullen, PT GP 1    [id number]  GAIT TRAINING EA 15 MIN 9/25/2024 Angie Cullen, PT GP 1    [id number]  PT THER PROC EA 15 MIN 9/25/2024 Angie Cullen, PT GP 2              PT G-Codes  AM-PAC 6 Clicks Score (PT): 18      Angie Cullen, PT  9/25/2024

## 2024-09-25 NOTE — PHARMACY PATIENT ASSISTANCE
Pharmacy evaluated coverage of Eliquis and Jardiance for patient. The patient does not currently have prescription insurance. Will add a free trial card for both of these medications to use at discharge and recommend patient see a provider in a system that has a sliding scale for financial assistance, such as [employer], for future fills. Also recommend patient sign up for prescription drug insurance during the open enrollment period.    Thank you,    Roxy Sharma, PharmD  09/25/24  14:56 EDT

## 2024-09-26 LAB
GLUCOSE BLDC GLUCOMTR-MCNC: 120 MG/DL (ref 70–130)
GLUCOSE BLDC GLUCOMTR-MCNC: 96 MG/DL (ref 70–130)

## 2024-09-26 PROCEDURE — 97530 THERAPEUTIC ACTIVITIES: CPT

## 2024-09-26 PROCEDURE — 97116 GAIT TRAINING THERAPY: CPT

## 2024-09-26 PROCEDURE — 97535 SELF CARE MNGMENT TRAINING: CPT

## 2024-09-26 PROCEDURE — 97110 THERAPEUTIC EXERCISES: CPT

## 2024-09-26 PROCEDURE — 82948 REAGENT STRIP/BLOOD GLUCOSE: CPT

## 2024-09-26 PROCEDURE — 99231 SBSQ HOSP IP/OBS SF/LOW 25: CPT | Performed by: INTERNAL MEDICINE

## 2024-09-26 RX ADMIN — ATORVASTATIN CALCIUM 40 MG: 40 TABLET, FILM COATED ORAL at 08:56

## 2024-09-26 RX ADMIN — EMPAGLIFLOZIN 10 MG: 10 TABLET, FILM COATED ORAL at 08:56

## 2024-09-26 RX ADMIN — NICOTINE 1 PATCH: 21 PATCH TRANSDERMAL at 08:55

## 2024-09-26 RX ADMIN — Medication 1000 MCG: at 08:56

## 2024-09-26 RX ADMIN — APIXABAN 5 MG: 5 TABLET, FILM COATED ORAL at 20:36

## 2024-09-26 RX ADMIN — ASPIRIN 81 MG: 81 TABLET, COATED ORAL at 08:55

## 2024-09-26 RX ADMIN — METOPROLOL TARTRATE 50 MG: 50 TABLET, FILM COATED ORAL at 08:56

## 2024-09-26 RX ADMIN — SENNOSIDES AND DOCUSATE SODIUM 2 TABLET: 50; 8.6 TABLET ORAL at 08:55

## 2024-09-26 RX ADMIN — SPIRONOLACTONE 12.5 MG: 25 TABLET ORAL at 08:56

## 2024-09-26 RX ADMIN — APIXABAN 5 MG: 5 TABLET, FILM COATED ORAL at 08:56

## 2024-09-26 NOTE — THERAPY TREATMENT NOTE
"Inpatient Rehabilitation - Physical Therapy Treatment Note        Thom     Patient Name: Anshul Andrews  : 1955  MRN: [id number]    Today's Date: 2024                    Admit Date: 2024      Visit Dx:   No diagnosis found.    Patient Active Problem List   Diagnosis    CVA (cerebral vascular accident)       History reviewed. No pertinent past medical history.    History reviewed. No pertinent surgical history.    PT ASSESSMENT (Last 12 Hours)       IRF PT Evaluation and Treatment       Row Name 24 [facility]6          PT Time and Intention    Document Type daily treatment  -AG     Mode of Treatment physical therapy  -AG     Patient/Family/Caregiver Comments/Observations pt. agreeable to participation, however did politely decline to participate full 90 minutes in AM; declined participation in PM.  -AG       Row Name 24 1116          General Information    Patient Profile Reviewed yes  -AG     Existing Precautions/Restrictions fall  -AG       Row Name 24 1116          Pain Assessment    Pre/Posttreatment Pain Comment chronic intermittent L knee pain; states slightly more pain today d/t \"weather\".  -AG       Row Name 24 1116          Cognition/Psychosocial    Orientation Status (Cognition) oriented x 4  -AG     Follows Commands (Cognition) WFL  -AG     Personal Safety Interventions fall prevention program maintained;gait belt;nonskid shoes/slippers when out of bed;supervised activity  -AG       Row Name 24 1116          Bed Mobility    Bed Mobility bed mobility (all) activities  -AG     All Activities, [place] (Bed Mobility) independent  -AG       Row Name 24 1116          Transfer Assessment/Treatment    Transfers sit-stand transfer;stand-sit transfer;stand pivot/stand step transfer;toilet transfer;car transfer  -AG       Row Name 24 1116          Bed-Chair Transfer    Bed-Chair [place] (Transfers) supervision  -AG     Assistive Device (Bed-Chair " Transfers) wheelchair  -AG       Row Name 09/26/24 1116          Chair-Bed Transfer    Chair-Bed [place] (Transfers) supervision  -AG     Assistive Device (Chair-Bed Transfers) wheelchair  -AG       [facility] Name 09/26/24 1116          Sit-Stand Transfer    Sit-Stand [place] (Transfers) supervision  -AG       Row Name 09/26/24 [facility]6          Stand-Sit Transfer    Stand-Sit [place] (Transfers) supervision  -AG     Assistive Device (Stand-Sit Transfers) --  none  -AG       Row Name 09/26/24 1116          Stand Pivot/Stand Step Transfer    Stand Pivot/Stand Step [place] (Transfers) supervision  -AG       Row Name 09/26/24 1116          Toilet Transfer    Type (Toilet Transfer) stand pivot/stand step  -AG     [place] Level (Toilet Transfer) supervision  -AG     Assistive Device (Toilet Transfer) commode  -AG       Row Name 09/26/24 1116          Car Transfer    Type (Car Transfer) stand pivot/stand step  -AG     [place] Level (Car Transfer) standby assist;verbal cues  -AG     Assistive Device (Car Transfer) --  none  -AG       Row Name 09/26/24 1116          Gait/Stairs (Locomotion)    [place] Level (Gait) standby assist  -AG     Assistive Device (Gait) --  none  -AG     Distance in Feet (Gait) 300  -AG     Pattern (Gait) step-through  -AG     Bilateral Gait Deviations forward flexed posture  B knee flexion  -AG     Negotiation (Stairs) handrail location;number of steps;ascending technique;descending technique  -AG     [place] Level (Stairs) stand by assist  -AG     Handrail Location (Stairs) both sides  -AG     Number of Steps (Stairs) 10  -AG     Ascending Technique (Stairs) step-over-step  -AG     Descending Technique (Stairs) step-over-step  -AG       Row Name 09/26/24 1116          Safety Issues, Functional Mobility    Impairments Affecting Function (Mobility) balance;coordination;endurance/activity tolerance  -AG       Row Name 09/26/24 1116          Balance    Static Sitting  Balance independent  -AG     Position, Sitting Balance unsupported;sitting edge of bed  -AG     Static Standing Balance supervision  -AG     Dynamic Standing Balance standby assist;supervision  -AG     Position/Device Used, Standing Balance --  none  -AG     Balance Interventions standing;static;dynamic;narrowed base of support;tandem gait;manual resistance applied during activity;single limb stance  -AG     Comment, Balance static and dynamic standing activities: NBOS, single leg stance; box steps, backward and lateral steps; tandem gait; unsupported st. bean bag toss/ retrieve bags from floor  -AG       Row Name 09/26/24 1116          Motor Skills    Motor Control/Coordination Interventions stepping/walking  -AG       Row Name 09/26/24 1116          Hip (Therapeutic Exercise)    Hip Strengthening (Therapeutic Exercise) bilateral;flexion;extension;marching while seated;marching while standing;mini squats;sitting;standing;3 lb free weight  -AG       Row Name 09/26/24 1116          Knee (Therapeutic Exercise)    Knee Strengthening (Therapeutic Exercise) bilateral;flexion;extension;marching while standing;marching while seated;LAQ (long arc quad);sitting;standing;3 lb free weight  -AG       Row Name 09/26/24 1116          Ankle (Therapeutic Exercise)    Ankle Strengthening (Therapeutic Exercise) bilateral;dorsiflexion;plantarflexion;standing;3 lb free weight  -AG       Row Name 09/26/24 1116          Advanced Stepping/Walking Interventions    Stepping/Walking Interventions backward walking;box stepping;side stepping;tandem walking  -AG     Backward Walking (Stepping/Walking Interventions) SBA  -AG     Box Stepping (Stepping/Walking Interventions) SBA  -AG     Tandem Walking (Stepping/Walking Interventions) SBA  -AG     Side Stepping (Stepping/Walking Interventions) SBA  -AG       Row Name 09/26/24 1116          Coping Strategies    Trust Relationship/Rapport care explained;choices provided;thoughts/feelings  acknowledged  -AG       Row Name 09/26/24 1116          Positioning and Restraints    Pre-Treatment Position in bed  -AG     In Bed sitting EOB;call light within reach;encouraged to call for assist  -AG       Row Name 09/26/24 1116          Therapy Assessment/Plan (PT)    Patient's Goals For Discharge return to all previous roles/activities  -AG       Row Name 09/26/24 1116          Daily Progress Summary (PT)    Daily Progress Summary (PT) pt. approaching supervision/ modified independence with balance and ambulation.  PT recommends pt. be able to ambulate to bathroom ad mary without need for assistive device or call button.  Communicated this to nursing staff.  -AG     Recommendations (PT) continue POC; discussed with pt. option of  attending outpatient PT following d/c.  Pt. reports he's not interested at this time.  -AG       Row Name 09/26/24 1116          Therapy Plan Review/Discharge Plan (PT)    Anticipated Discharge Disposition (PT) home  -AG               User Key  (r) = Recorded By, (t) = Taken By, (c) = Cosigned By      Initials Name Provider Type    Angie Aceves, PT Physical Therapist                     Physical Therapy Education       Title: PT OT SLP Therapies (Done)       Topic: Physical Therapy (Done)       Point: Mobility training (Done)       Learning Progress Summary             Patient Acceptance, E,D, VU by  at 9/26/2024 1115    Acceptance, E,D, VU,NR by  at 9/25/2024 1502                         Point: Home exercise program (Done)       Learning Progress Summary             Patient Acceptance, E,D, VU by AG at 9/26/2024 1115    Acceptance, E,D, VU,NR by  at 9/25/2024 1502                         Point: Body mechanics (Done)       Learning Progress Summary             Patient Acceptance, E,D, VU by AG at 9/26/2024 1115    Acceptance, E,D, VU,NR by  at 9/25/2024 1502                         Point: Precautions (Done)       Learning Progress Summary             Patient Acceptance,  E,D, VU by  at 9/26/2024 1115    Acceptance, E,D, VU,NR by  at 9/25/2024 1502                                         User Key       Initials Effective Dates Name Provider Type Discipline     06/16/21 -  Angie Cullen, PT Physical Therapist PT                    PT Recommendation and Plan    Planned Therapy Interventions (PT): balance training, gait training, home exercise program, motor coordination training, neuromuscular re-education, patient/family education, postural re-education, strengthening, transfer training  Frequency of Treatment (PT): 5 times per week  Anticipated Equipment Needs at Discharge (PT Eval):  (none)  Daily Progress Summary (PT)  Daily Progress Summary (PT): pt. approaching supervision/ modified independence with balance and ambulation.  PT recommends pt. be able to ambulate to bathroom ad mary without need for assistive device or call button.  Communicated this to nursing staff.  Recommendations (PT): continue POC; discussed with pt. option of  attending outpatient PT following d/c.  Pt. reports he's not interested at this time.               Time Calculation:      PT Charges       Row Name 09/26/24 1132 09/26/24 1114          Time Calculation    Start Time -- 1000  -     Stop Time -- 1105  -AG     Time Calculation (min) -- 65 min  -     PT Received On -- 09/26/24  -     PT - Next Appointment 09/27/24  - --               User Key  (r) = Recorded By, (t) = Taken By, (c) = Cosigned By      Initials Name Provider Type     Angie Cullen, PT Physical Therapist                    Therapy Charges for Today       Code Description Service Date Service Provider Modifiers Qty    65569961676 HC PT EVAL LOW COMPLEXITY 3 9/25/2024 nAgie Cullen, PT GP 1    67438403636 HC GAIT TRAINING EA 15 MIN 9/25/2024 Angie Cullen, PT GP 1    30226545549 HC PT THER PROC EA 15 MIN 9/25/2024 Angie Cullen, PT GP 2    26600800041 HC GAIT TRAINING EA 15 MIN 9/26/2024 Angie Cullen, PT GP 3     [id number]  PT THER PROC EA 15 MIN 9/26/2024 Angie Cullen, PT GP 1              PT G-Codes  AM-PAC 6 Clicks Score (PT): 18      Angie Cullen, PT  9/26/2024

## 2024-09-26 NOTE — PROGRESS NOTES
Occupational Therapy: Individual: 90 minutes.    Physical Therapy:    Speech Language Pathology:    Signed by: Melisa Alaniz OT

## 2024-09-26 NOTE — SIGNIFICANT NOTE
09/26/24 1430   Plan   Plan Spoke to pt about plans for discharge on 10-2-24 and how he is doing in therapy.  Discussed possible need for outpatient therapy and explained PT/OT will make recommendations closer to discharge date.  Pt will go home with son and his significant other Emily and her two children when he is discharged.   Patient/Family in Agreement with Plan yes

## 2024-09-26 NOTE — PROGRESS NOTES
COVID Screen    Does Patient OR patient's household members have any of the following:    · Temperature: Fever greater than or equal to 100.4 F   No   · Respiratory symptoms: New or worsening cough, shortness of breath, or sore throat   No   · GI Symptoms: New onset of nausea, vomiting or diarrhea   No   · Miscellaneous: New onset of loss of taste or smell, chills, repeated shaking with chills, muscle pain or headache   No   · In the last 14 days,  have you or anyone in your household been tested,suspected of having or have a test in process for covid?   No   · In the last 14 days, have you or anyone in your household had any contact with anyone who has tested positive,suspected of having, or have a test in process for covid?   No     If patient is scheduled for a non virtual appointment (in clinic):  · Patient informed of policy for masking while present for appointments and asked to bring own mask if able to and/or told a mask will be given at arrival to clinic   Yes  · Patient informed of visitor restrictions (if a visitor/chaperone is necessary please list name of person)   Yes  · Patient advised not to arrive more than 20 minutes before appointment time   Yes     Rehabilitation Nursing  Inpatient Rehabilitation Plan of Care Note    Plan of Care  Mobility    Bed/Chair/Wheelchair (Active)  Current Status (9/24/2024 12:00:00 AM): Increase physical activity  Weekly Goal: Joint mobility maintained  Discharge Goal: Demonstrates use of adaptive equipment    Safety    Potential for Injury (Active)  Current Status (9/24/2024 12:00:00 AM): Free from injury this shift  Weekly Goal: Prevent falls this week  Discharge Goal: Identifies measures to prevent injury; no falls this  hospitalization    Signed by: Huseyin Llanos RN

## 2024-09-26 NOTE — PROGRESS NOTES
Occupational Therapy:    Physical Therapy: Individual: 65 minutes.    Speech Language Pathology:    Signed by: Angie Cullen, Physical Therapist

## 2024-09-26 NOTE — PLAN OF CARE
Problem: Rehabilitation (IRF) Plan of Care  Goal: Plan of Care Review  Outcome: Progressing  Flowsheets (Taken 9/26/2024 0120)  Progress: no change  Plan of Care Reviewed With: patient  Goal: Patient-Specific Goal (Individualized)  Outcome: Progressing  Goal: Absence of New-Onset Illness or Injury  Outcome: Progressing  Intervention: Prevent Fall and Fall Injury  Description: Perform fall risk screening on admission and with change in status.  Communicate fall injury risk to the healthcare team.  Promote environmental and care measures specific to identified risk factors.  Develop strategies to prevent or minimize injury during falls and to get up safely after falling.  Perform regular intentional rounding to assess need for position change, pain assessment, personal needs.  Recent Flowsheet Documentation  Taken 9/26/2024 0000 by Jovita Ngo, RN  Safety Promotion/Fall Prevention:   nonskid shoes/slippers when out of bed   safety round/check completed  Taken 9/25/2024 2200 by Jovita Ngo, RN  Safety Promotion/Fall Prevention:   nonskid shoes/slippers when out of bed   safety round/check completed  Taken 9/25/2024 2000 by Jovita Ngo, RN  Safety Promotion/Fall Prevention:   nonskid shoes/slippers when out of bed   safety round/check completed  Goal: Optimal Comfort and Wellbeing  Outcome: Progressing  Goal: Home and Community Transition Plan Established  Outcome: Progressing   Goal Outcome Evaluation:  Plan of Care Reviewed With: patient        Progress: no change

## 2024-09-26 NOTE — THERAPY TREATMENT NOTE
"Inpatient Rehabilitation - Physical Therapy Treatment Note        Thom     Patient Name: Anshul Andrews  : 1955  MRN: [id number]    Today's Date: 2024                    Admit Date: 2024      Visit Dx:   No diagnosis found.    Patient Active Problem List   Diagnosis    CVA (cerebral vascular accident)       History reviewed. No pertinent past medical history.    History reviewed. No pertinent surgical history.    PT ASSESSMENT (Last 12 Hours)       IRF PT Evaluation and Treatment       Row Name 24 1116          PT Time and Intention    Document Type daily treatment  -AG     Mode of Treatment physical therapy  -AG     Patient/Family/Caregiver Comments/Observations pt. agreeable to participation, however did politely decline to participate full 90 minutes in AM; declined participation in PM.  -       Row Name 24 1116          General Information    Patient Profile Reviewed yes  -AG     Existing Precautions/Restrictions fall  -AG       Row Name 24 111          Pain Assessment    Pre/Posttreatment Pain Comment chronic intermittent L knee pain; states slightly more pain today d/t \"weather\".  -AG       Row Name 24 1116          Cognition/Psychosocial    Orientation Status (Cognition) oriented x 4  -AG     Follows Commands (Cognition) WFL  -AG     Personal Safety Interventions fall prevention program maintained;gait belt;nonskid shoes/slippers when out of bed;supervised activity  -AG       Row Name 24 111          Bed Mobility    Bed Mobility bed mobility (all) activities  -AG     All Activities, [place] (Bed Mobility) independent  -       Row Name 24 1116          Transfer Assessment/Treatment    Transfers sit-stand transfer;stand-sit transfer;stand pivot/stand step transfer;toilet transfer;car transfer  -       Row Name 24 1116          Sit-Stand Transfer    Sit-Stand [place] (Transfers) supervision  -       Row Name 24 1116          " Stand-Sit Transfer    Stand-Sit [place] (Transfers) supervision  -AG       Row Name 09/26/24 1116          Stand Pivot/Stand Step Transfer    Stand Pivot/Stand Step [place] (Transfers) supervision  -AG       Row Name 09/26/24 1116          Toilet Transfer    Type (Toilet Transfer) stand pivot/stand step  -AG     [place] Level (Toilet Transfer) supervision  -AG     Assistive Device (Toilet Transfer) commode  -AG       [facility] Name 09/26/24 1116          Car Transfer    Type (Car Transfer) stand pivot/stand step  -AG     [place] Level (Car Transfer) standby assist;verbal cues  -AG     Assistive Device (Car Transfer) --  none  -AG       [facility] Name 09/26/24 1116          Gait/Stairs (Locomotion)    [place] Level (Gait) standby assist  -AG     Assistive Device (Gait) --  none  -AG     Distance in Feet (Gait) 300  -AG     Pattern (Gait) step-through  -AG     Bilateral Gait Deviations forward flexed posture  B knee flexion  -AG     Negotiation (Stairs) handrail location;number of steps;ascending technique;descending technique  -AG     [place] Level (Stairs) stand by assist  -AG     Handrail Location (Stairs) both sides  -AG     Number of Steps (Stairs) 10  -AG     Ascending Technique (Stairs) step-over-step  -AG     Descending Technique (Stairs) step-over-step  -AG       [facility] Name 09/26/24 1116          Safety Issues, Functional Mobility    Impairments Affecting Function (Mobility) balance;coordination;endurance/activity tolerance  -AG       [facility] Name 09/26/24 1116          Balance    Static Sitting Balance independent  -AG     Position, Sitting Balance unsupported;sitting edge of bed  -AG     Static Standing Balance supervision  -AG     Dynamic Standing Balance standby assist;supervision  -AG     Position/Device Used, Standing Balance --  none  -AG     Balance Interventions standing;static;dynamic;narrowed base of support;tandem gait;manual resistance applied during activity;single limb stance  -AG      Comment, Balance static and dynamic standing activities: NBOS, single leg stance; box steps, backward and lateral steps; tandem gait; unsupported st. bean bag toss/ retrieve bags from floor  -       Row Name 09/26/24 1116          Motor Skills    Motor Control/Coordination Interventions stepping/walking  -AG       Row Name 09/26/24 1116          Hip (Therapeutic Exercise)    Hip Strengthening (Therapeutic Exercise) bilateral;flexion;extension;marching while seated;marching while standing;mini squats;sitting;standing;3 lb free weight  -AG       Row Name 09/26/24 1116          Knee (Therapeutic Exercise)    Knee Strengthening (Therapeutic Exercise) bilateral;flexion;extension;marching while standing;marching while seated;LAQ (long arc quad);sitting;standing;3 lb free weight  -AG       Row Name 09/26/24 1116          Ankle (Therapeutic Exercise)    Ankle Strengthening (Therapeutic Exercise) bilateral;dorsiflexion;plantarflexion;standing;3 lb free weight  -AG       Row Name 09/26/24 1116          Advanced Stepping/Walking Interventions    Stepping/Walking Interventions backward walking;box stepping;side stepping;tandem walking  -AG     Backward Walking (Stepping/Walking Interventions) SBA  -AG     Box Stepping (Stepping/Walking Interventions) SBA  -AG     Tandem Walking (Stepping/Walking Interventions) SBA  -AG     Side Stepping (Stepping/Walking Interventions) SBA  -AG       Row Name 09/26/24 1116          Coping Strategies    Trust Relationship/Rapport care explained;choices provided;thoughts/feelings acknowledged  -[facility] Name 09/26/24 1116          Positioning and Restraints    Pre-Treatment Position in bed  -AG     In Bed sitting EOB;call light within reach;encouraged to call for assist  -       Row Name 09/26/24 1116          Therapy Assessment/Plan (PT)    Patient's Goals For Discharge return to all previous roles/activities  -[facility] Name 09/26/24 1116          Daily Progress Summary (PT)     Daily Progress Summary (PT) pt. approaching supervision/ modified independence with balance and ambulation.  PT recommends pt. be able to ambulate to bathroom ad mary without need for assistive device or call button.  Communicated this to nursing staff.  -AG     Recommendations (PT) continue POC; discussed with pt. option of  attending outpatient PT following d/c.  Pt. reports he's not interested at this time.  -AG       Row Name 09/26/24 1116          Therapy Plan Review/Discharge Plan (PT)    Anticipated Discharge Disposition (PT) home  -               User Key  (r) = Recorded By, (t) = Taken By, (c) = Cosigned By      Initials Name Provider Type    AG Angie Cullen, PT Physical Therapist                     Physical Therapy Education       Title: PT OT SLP Therapies (Done)       Topic: Physical Therapy (Done)       Point: Mobility training (Done)       Learning Progress Summary             Patient Acceptance, E,D, VU by  at 9/26/2024 1115    Acceptance, E,D, VU,NR by  at 9/25/2024 1502                         Point: Home exercise program (Done)       Learning Progress Summary             Patient Acceptance, E,D, VU by  at 9/26/2024 1115    Acceptance, E,D, VU,NR by  at 9/25/2024 1502                         Point: Body mechanics (Done)       Learning Progress Summary             Patient Acceptance, E,D, VU by  at 9/26/2024 1115    Acceptance, E,D, VU,NR by  at 9/25/2024 1502                         Point: Precautions (Done)       Learning Progress Summary             Patient Acceptance, E,D, VU by  at 9/26/2024 1115    Acceptance, E,D, VU,NR by  at 9/25/2024 1502                                         User Key       Initials Effective Dates Name Provider Type [facility] 06/16/21 -  Angie Cullen, PT Physical Therapist PT                    PT Recommendation and Plan    Planned Therapy Interventions (PT): balance training, gait training, home exercise program, motor coordination training,  neuromuscular re-education, patient/family education, postural re-education, strengthening, transfer training  Frequency of Treatment (PT): 5 times per week  Anticipated Equipment Needs at Discharge (PT Eval):  (none)  Daily Progress Summary (PT)  Daily Progress Summary (PT): pt. approaching supervision/ modified independence with balance and ambulation.  PT recommends pt. be able to ambulate to bathroom ad mary without need for assistive device or call button.  Communicated this to nursing staff.  Recommendations (PT): continue POC; discussed with pt. option of  attending outpatient PT following d/c.  Pt. reports he's not interested at this time.               Time Calculation:      PT Charges       Row Name 09/26/24 1132 09/26/24 1114          Time Calculation    Start Time -- 1000  -AG     Stop Time -- 1105  -AG     Time Calculation (min) -- 65 min  -AG     PT Received On -- 09/26/24  -AG     PT - Next Appointment 09/27/24  -AG --               User Key  (r) = Recorded By, (t) = Taken By, (c) = Cosigned By      Initials Name Provider Type     Angie Cullen, PT Physical Therapist                    Therapy Charges for Today       Code Description Service Date Service Provider Modifiers Qty    95135251773 HC PT EVAL LOW COMPLEXITY 3 9/25/2024 Angie Cullen, PT GP 1    11455598744 HC GAIT TRAINING EA 15 MIN 9/25/2024 Angie Cullen, PT GP 1    40163214748 HC PT THER PROC EA 15 MIN 9/25/2024 Angie Cullen, PT GP 2    45523058730 HC GAIT TRAINING EA 15 MIN 9/26/2024 Angie Cullen, PT GP 3    30932224804 HC PT THER PROC EA 15 MIN 9/26/2024 Angie Cullen, PT GP 1              PT G-Codes  AM-PAC 6 Clicks Score (PT): 18      Angie Cullen PT  9/26/2024

## 2024-09-26 NOTE — THERAPY TREATMENT NOTE
Inpatient Rehabilitation - Occupational Therapy Treatment Note     Thom     Patient Name: Anshul Andrews  : 1955  MRN: [id number]    Today's Date: 2024                 Admit Date: 2024       No diagnosis found.    Patient Active Problem List   Diagnosis    CVA (cerebral vascular accident)       History reviewed. No pertinent past medical history.    History reviewed. No pertinent surgical history.          IRF OT ASSESSMENT FLOWSHEET (Last 12 Hours)       IRF OT Evaluation and Treatment       Row Name 24 1316          OT Time and Intention    Document Type daily treatment  -TM     Mode of Treatment occupational therapy  -TM     Patient Effort good  -TM     Symptoms Noted During/After Treatment none  -TM       Row Name 24 1316          General Information    General Observations of Patient Pt agreeable for therapy.  -TM     Existing Precautions/Restrictions fall  -TM       Row Name 24 1316          Cognition/Psychosocial    Orientation Status (Cognition) oriented x 4  -TM     Follows Commands (Cognition) WFL  -TM       Row Name 24 131          Lower Body Dressing    [place] Level (Lower Body Dressing) contact guard assist;minimum assist (75% patient effort);verbal cues  -TM     Position (Lower Body Dressing) edge of bed sitting  -TM       Row Name 24 1316          Grooming    [place] Level (Grooming) set up  -TM     Position (Grooming) supported sitting  -TM       Row Name 24 1316          Toileting    [place] Level (Toileting) supervision;contact guard assist;verbal cues  -TM     Assistive Device Use (Toileting) grab bar/safety frame  -TM     Position (Toileting) supported sitting  -TM       Row Name 24 1316          Bed-Chair Transfer    Bed-Chair [place] (Transfers) standby assist;contact guard;verbal cues  -TM     Assistive Device (Bed-Chair Transfers) wheelchair  -TM       Row Name 24 1316          Chair-Bed Transfer     Chair-Bed [place] (Transfers) standby assist;verbal cues  -     Assistive Device (Chair-Bed Transfers) wheelchair  -       Row Name 09/26/24 1316          Toilet Transfer    [place] Level (Toilet Transfer) standby assist;supervision;verbal cues  -     Assistive Device (Toilet Transfer) wheelchair;grab bars/safety frame  -       Row Name 09/26/24 1316          Motor Skills    Motor Skills coordination;functional endurance;motor control/coordination interventions  -     Motor Control/Coordination Interventions therapeutic exercise/ROM;gross motor coordination activities;fine motor manipulation/dexterity activities;other (see comments)  BUE ther ex/act, GMC/FMC, bilateral coord  -               User Key  (r) = Recorded By, (t) = Taken By, (c) = Cosigned By      Initials Name Effective Dates     Melisa Alaniz OT 06/16/21 -                      Occupational Therapy Education       Title: PT OT SLP Therapies (Done)       Topic: Occupational Therapy (Done)       Point: ADL training (Done)       Description:   Instruct learner(s) on proper safety adaptation and remediation techniques during self care or transfers.   Instruct in proper use of assistive devices.                  Learning Progress Summary             Patient Acceptance, E,D, VU,NR by  at 9/26/2024 1314    Acceptance, E,D, VU,NR by  at 9/25/2024 1018                         Point: Precautions (Done)       Description:   Instruct learner(s) on prescribed precautions during self-care and functional transfers.                  Learning Progress Summary             Patient Acceptance, E,D, VU,NR by  at 9/26/2024 1314    Acceptance, E,D, VU,NR by  at 9/25/2024 1018                                         User Key       Initials Effective Dates Name Provider Type Discipline     06/16/21 -  Melisa Alaniz OT Occupational Therapist OT                        OT Recommendation and Plan    Planned Therapy Interventions  (OT): activity tolerance training, adaptive equipment training, BADL retraining, IADL retraining, neuromuscular control/coordination retraining, occupation/activity based interventions, patient/caregiver education/training, ROM/therapeutic exercise, strengthening exercise, transfer/mobility retraining                    Time Calculation:      Time Calculation- OT       Row Name 09/26/24 1314             Time Calculation- OT    OT Start Time 0755  -TM      OT Stop Time 0925  -TM      OT Time Calculation (min) 90 min  -TM      Total Timed Code Minutes- OT 90 minute(s)  -TM      OT Non-Billable Time (min) 15 min  -TM                User Key  (r) = Recorded By, (t) = Taken By, (c) = Cosigned By      Initials Name Provider Type    TM Melisa Alaniz OT Occupational Therapist                  Therapy Charges for Today       Code Description Service Date Service Provider Modifiers Qty    68183029164 HC OT EVAL HIGH COMPLEXITY 3 9/25/2024 Melisa Alaniz OT GO 1    87324571661 HC OT SELF CARE/MGMT/TRAIN EA 15 MIN 9/25/2024 Melisa Alaniz, OT GO 2    16432020713 HC OT THERAPEUTIC ACT EA 15 MIN 9/25/2024 Melisa Alaniz, OT GO 2    85866453292 HC OT SELF CARE/MGMT/TRAIN EA 15 MIN 9/26/2024 Melisa Alaniz OT GO 2    13803088232 HC OT THERAPEUTIC ACT EA 15 MIN 9/26/2024 Melisa Alaniz, OT GO 2    87909985456 HC OT THER PROC EA 15 MIN 9/26/2024 Melisa Alaniz, OT GO 2                     Melisa Alaniz OT  9/26/2024

## 2024-09-26 NOTE — PROGRESS NOTES
Patient Assessment Instrument  Quality Indicators - Admission FY 2023    Section A. Ethnicity/ Race/Language  Ethnicity:  Race:  Preferred Language:    Section A. Transportation  Issues Due to Lack of Transportation:   No    Section B. Hearing and Vision        Section B. Health Literacy        Section C. Cognitive Patterns      Section C. Signs and Symptoms of Delirium (from CAM)      Section D. Mood      Section D. Social Isolation      Section OD4655. Prior Functioning    Self Care: Patient completed all the activities by themself, with or without an  assistive device, with no assistance from a helper.  Indoor Mobility: Patient completed the activities by themself, with or without  an assistive device, with no assistance from a helper.  Stairs: Patient completed the activities by themself, with or without an  assistive device, with no assistance from a helper.  Functional Cognition: Patient completed the activities by themself, with or  without an assistive device, with no assistance from a helper.    Section CK6750. Prior Device Use  Patient does not use manual or motorized wheelchair or scooter, mechanical lift,  walker, or an orthotic/prosthesis.    Section OW7797. Self Care Performance      Section YS1132. Self Care Discharge Goals      Section MC0646. Mobility Performance      Section YX8534. Mobility Discharge Goals      Section H. Bladder and Bowel  Bladder Continence: Always continent (no documented incontinence).  Bowel Continence: Always continent (no documented incontinence).    Section I. Active Diagnosis  Comorbidities and Co-existing Conditions:   Diabetes Mellitus (DM) - e.g.,  diabetic retinopathy, nephropathy, and neuropathy).    Section J. Health Conditions      Section J. Health Conditions (Pain)      Section K. Swallowing/Nutritional Status  Regular food (solids and liquids swallowed safely without supervision or  modified food or liquid consistency).  Nutritional Approaches on Admission:   Nutritional Approaches on Admission:  Therapeutic diet (e.g., low salt, diabetic, low cholesterol)    Section M. Skin Conditions      Section N. Medication    Potential Clinically Significant Medication Issues: No issues found during  review  Patient is taking medications in the following pharmacological classification:  I. Antiplatelet An indication is noted for all medications in the Antiplatelet  drug class. J. Hypoglycemic (including insulin) An indication is noted for all  medications in the Hypoglycemic drug class. E. Anticoagulant An indication is  noted for all medications in the Anticoagulant drug class.  Potential Clinically Significant Medication Issues: No issues found during  review    Section O. Special Treatments, Procedures, and Programs  None    Signed by: Kaylene Morrison, Supervisor

## 2024-09-26 NOTE — PROGRESS NOTES
Assisted By: Huseyin MORENO    CC: Follow-up on CVA    Interview History/HPI: Patient states he is doing okay no new complaints today slept well and is tolerating his diet.  He states his bowels have moved here.  No chest pain or shortness of breath.          Current Hospital Meds:  apixaban, 5 mg, Oral, Q12H  aspirin, 81 mg, Oral, Daily  atorvastatin, 40 mg, Oral, Daily  empagliflozin, 10 mg, Oral, Daily  metoprolol tartrate, 50 mg, Oral, Q12H  nicotine, 1 patch, Transdermal, Q24H  senna-docusate sodium, 2 tablet, Oral, BID  spironolactone, 12.5 mg, Oral, Daily  vitamin B-12, 1,000 mcg, Oral, Daily         Vitals:    09/26/24 0700   BP: 128/75   Pulse: 79   Resp: 20   Temp: 98.2 °F (36.8 °C)   SpO2: 95%         Intake/Output Summary (Last 24 hours) at 9/26/2024 1109  Last data filed at 9/26/2024 0900  Gross per 24 hour   Intake 1080 ml   Output --   Net 1080 ml       EXAM: He is pleasant lying in bed no distress lungs clear, heart regular rate and rhythm there is still slight coordination difficulty with the left hand compared to the right,  strength is overall symmetric.  He moves both legs almost symmetrically.  Left may be slightly weaker.      Diet: Diabetic, Regular/House; Consistent Carbohydrate; Fluid Consistency: Thin (IDDSI 0)        LABS:     Lab Results (last 48 hours)       Procedure Component Value Units Date/Time    POC Glucose Once [[id number]]  (Normal) Collected: 09/26/24 0633    Specimen: Blood Updated: 09/26/24 0700     Glucose 96 mg/dL     POC Glucose Once [[id number]]  (Normal) Collected: 09/25/24 2014    Specimen: Blood Updated: 09/25/24 2020     Glucose 96 mg/dL     POC Glucose Once [[id number]]  (Normal) Collected: 09/25/24 1603    Specimen: Blood Updated: 09/25/24 1609     Glucose 90 mg/dL     POC Glucose Once [[id number]]  (Normal) Collected: 09/25/24 1051    Specimen: Blood Updated: 09/25/24 1057     Glucose 105 mg/dL     POC Glucose Once [[id number]]  (Normal) Collected: 09/25/24 0622     Specimen: Blood Updated: 09/25/24 0628     Glucose 121 mg/dL     Comprehensive Metabolic Panel [[id number]]  (Abnormal) Collected: 09/25/24 0010    Specimen: Blood Updated: 09/25/24 0048     Glucose 91 mg/dL      BUN 32 mg/dL      Creatinine 1.17 mg/dL      Sodium 140 mmol/L      Potassium 4.6 mmol/L      Comment: Slight hemolysis detected by analyzer. Result may be falsely elevated.        Chloride 106 mmol/L      CO2 23.5 mmol/L      Calcium 9.2 mg/dL      Total Protein 6.8 g/dL      Albumin 3.8 g/dL      ALT (SGPT) 44 U/L      AST (SGOT) 34 U/L      Alkaline Phosphatase 128 U/L      Total Bilirubin 0.6 mg/dL      Globulin 3.0 gm/dL      A/G Ratio 1.3 g/dL      BUN/Creatinine Ratio 27.4     Anion Gap 10.5 mmol/L      eGFR 67.9 mL/min/1.73     Narrative:      GFR Normal >60  Chronic Kidney Disease <60  Kidney Failure <15      Hemoglobin A1c [[id number]]  (Abnormal) Collected: 09/25/24 0010    Specimen: Blood Updated: 09/25/24 0046     Hemoglobin A1C 6.20 %     Narrative:      Hemoglobin A1C Ranges:    Increased Risk for Diabetes  5.7% to 6.4%  Diabetes                     >= 6.5%  Diabetic Goal                < 7.0%    CBC & Differential [[id number]]  (Abnormal) Collected: 09/25/24 0010    Specimen: Blood Updated: 09/25/24 0025    Narrative:      The following orders were created for panel order CBC & Differential.  Procedure                               Abnormality         Status                     ---------                               -----------         ------                     CBC Auto Differential[[id number]]        Abnormal            Final result                 Please view results for these tests on the individual orders.    CBC Auto Differential [[id number]]  (Abnormal) Collected: 09/25/24 0010    Specimen: Blood Updated: 09/25/24 0025     WBC 13.42 10*3/mm3      RBC 6.33 10*6/mm3      Hemoglobin 17.0 g/dL      Hematocrit 54.1 %      MCV 85.5 fL      MCH 26.9 pg      MCHC 31.4 g/dL      RDW 15.9 %       RDW-SD 47.5 fl      MPV 10.5 fL      Platelets 291 10*3/mm3      Neutrophil % 65.5 %      Lymphocyte % 23.5 %      Monocyte % 8.0 %      Eosinophil % 1.7 %      Basophil % 0.7 %      Immature Grans % 0.6 %      Neutrophils, Absolute 8.78 10*3/mm3      Lymphocytes, Absolute 3.16 10*3/mm3      Monocytes, Absolute 1.07 10*3/mm3      Eosinophils, Absolute 0.23 10*3/mm3      Basophils, Absolute 0.10 10*3/mm3      Immature Grans, Absolute 0.08 10*3/mm3      nRBC 0.0 /100 WBC     POC Glucose Once [[id number]]  (Normal) Collected: 09/24/24 1958    Specimen: Blood Updated: 09/24/24 2004     Glucose 116 mg/dL     POC Glucose Once [[id number]]  (Abnormal) Collected: 09/24/24 1644    Specimen: Blood Updated: 09/24/24 1657     Glucose 197 mg/dL                  Radiology:    Imaging Results (Last 72 Hours)       ** No results found for the last 72 hours. **                Assessment/Plan:   Acute right MCA CVA with some minimal left residual at this time.  Patient did have some petechial hemorrhage into this by MRI however repeat CT did not show any change in the patient was started on Eliquis.  patient was seen by all 3 modalities of therapy yesterday.  With SLP both for language and swallowing he was not thought to be in need of further SLP services.  With OT, patient was min assist bathing, set up with dressing, min assist lower body dressing, set up for grooming, contact-guard to min assist for toileting and set up for self-feeding.  With PT, patient was independent for bed mobility standby assist for transfers and he was able to walk 300 feet standby assist, he does have some balance difficulties and continuing to work with him for improved balance and safety     Atrial fibrillation, paroxysmal, pulse remains regular continue Toprol for rate control if he does go into A-fib, continue Eliquis for further stroke prevention.     Tobacco use, counseled to quit which she is going to try, continue NicoDerm     Diabetes, A1c 6.2,  monitor glucometers reduced to twice daily., continue the Jardiance at this time.     Hypertension, controlled     HFrEF, appears compensated, continue beta-blocker, Jardiance, Aldactone.  Patient reportedly seen by cardiology at [facility] however with this new reduced EF, he may require cardiac catheterization at some point in the near future will need referral to cardiology as an outpatient.     Polycythemia, probably secondary to smoking, improved by repeat lab work    Cardiac thrombi, continue Eliquis     DVT prophylaxis, Eliquis will serve for this as well    Valeriy Brandon MD

## 2024-09-26 NOTE — SIGNIFICANT NOTE
09/26/24 6962   Plan   Plan Team conference held today.  Pt is recommended for discharge on 10-2-24.  Attempted to contact son Yogesh [phone] without success; voicemail is not set-up.

## 2024-09-26 NOTE — PLAN OF CARE
Goal Outcome Evaluation:  Plan of Care Reviewed With: patient                Problem: Rehabilitation (IRF) Plan of Care  Goal: Plan of Care Review  Outcome: Progressing  Flowsheets (Taken 9/26/2024 1031)  Plan of Care Reviewed With: patient  Goal: Patient-Specific Goal (Individualized)  Outcome: Progressing  Goal: Absence of New-Onset Illness or Injury  Outcome: Progressing  Intervention: Prevent Fall and Fall Injury  Recent Flowsheet Documentation  Taken 9/26/2024 1000 by Huseyin Llanos RN  Safety Promotion/Fall Prevention: safety round/check completed  Taken 9/26/2024 0800 by Huseyin Llanos RN  Safety Promotion/Fall Prevention: safety round/check completed  Intervention: Prevent Infection  Recent Flowsheet Documentation  Taken 9/26/2024 0800 by Huseyin Llanos RN  Infection Prevention:   hand hygiene promoted   rest/sleep promoted  Intervention: Prevent VTE (Venous Thromboembolism)  Recent Flowsheet Documentation  Taken 9/26/2024 0800 by Huseyin Llanos RN  VTE Prevention/Management: (eliquis) other (see comments)  Goal: Optimal Comfort and Wellbeing  Outcome: Progressing  Goal: Home and Community Transition Plan Established  Outcome: Progressing     Problem: Diabetes Comorbidity  Goal: Blood Glucose Level Within Targeted Range  Outcome: Progressing  Intervention: Monitor and Manage Glycemia  Recent Flowsheet Documentation  Taken 9/26/2024 0800 by Huseyin Llanos RN  Glycemic Management: blood glucose monitored     Problem: Fall Injury Risk  Goal: Absence of Fall and Fall-Related Injury  Outcome: Progressing  Intervention: Identify and Manage Contributors  Recent Flowsheet Documentation  Taken 9/26/2024 0800 by Huseyin Llanos RN  Medication Review/Management: medications reviewed  Intervention: Promote Injury-Free Environment  Recent Flowsheet Documentation  Taken 9/26/2024 1000 by Huseyin Llanos RN  Safety Promotion/Fall Prevention: safety round/check completed  Taken 9/26/2024 0800 by Huseyin Llanos RN  Safety  Promotion/Fall Prevention: safety round/check completed     Problem: Skin Injury Risk Increased  Goal: Skin Health and Integrity  Outcome: Progressing  Intervention: Optimize Skin Protection  Recent Flowsheet Documentation  Taken 9/26/2024 0800 by Huseyin Llanos RN  Pressure Reduction Techniques:   frequent weight shift encouraged   heels elevated off bed   weight shift assistance provided  Pressure Reduction Devices:   pressure-redistributing mattress utilized   positioning supports utilized   heel offloading device utilized  Skin Protection:   adhesive use limited   incontinence pads utilized     Problem: Adjustment to Stroke (Stroke Rehabilitation)  Goal: Optimal Adjustment to Stroke  Outcome: Progressing     Problem: BADL (Basic Activities of Daily Living) Impairment (Stroke Rehabilitation)  Goal: Optimal Safe BADL Performance  Outcome: Progressing     Problem: Bowel Elimination Management (Stroke Rehabilitation)  Goal: Effective Bowel Elimination/Continence  Outcome: Progressing  Intervention: Promote Bowel Elimination and Continence  Recent Flowsheet Documentation  Taken 9/26/2024 0800 by Huseyin Llanos RN  Device Use (Bowel Management): bedside commode     Problem: Cognitive Impairment (Stroke Rehabilitation)  Goal: Optimal Cognitive Function  Outcome: Progressing     Problem: Communication Impairment (Stroke Rehabilitation)  Goal: Effective Communication Skills  Outcome: Progressing  Intervention: Optimize Communication Skills  Recent Flowsheet Documentation  Taken 9/26/2024 0800 by Huseyin Llanos RN  Communication Enhancement Strategies: call light answered in person     Problem: IADL (Instrumental Activities of Daily Living) Impairment (Stroke Rehabilitation)  Goal: Optimal Safe IADL Performance  Outcome: Progressing     Problem: Mobility Impairment (Stroke Rehabilitation)  Goal: Optimal Mobility [place] and Safety  Outcome: Progressing     Problem: Movement and Motor Control Impairment (Stroke  Rehabilitation)  Goal: Optimal Movement and Motor Control  Outcome: Progressing     Problem: Sensory Perceptual Impairment (Stroke Rehabilitation)  Goal: Optimal Sensory Perceptual Status  Outcome: Progressing  Intervention: Facilitate Optimal Sensory Perceptual Status  Recent Flowsheet Documentation  Taken 9/26/2024 0800 by Huseyin Llanos RN  Sensory Impairment Compensatory Techniques:   frequent position change encouraged   skin protection techniques promoted     Problem: Sexuality and Intimacy (Stroke Rehabilitation)  Goal: Maintains Intimacy/Sexual Expression  Outcome: Progressing     Problem: Spasticity (Stroke Rehabilitation)  Goal: Effective Spasticity Management  Outcome: Progressing     Problem: Swallowing Impairment (Stroke Rehabilitation)  Goal: Optimal Oral Motor and Swallow Ability  Outcome: Progressing     Problem: Urinary Elimination Management (Stroke Rehabilitation)  Goal: Effective Urinary Elimination/Continence  Outcome: Progressing  Intervention: Promote Urinary Elimination and Continence  Recent Flowsheet Documentation  Taken 9/26/2024 0800 by Huseyin Llanos RN  Assistive Device Use (Bladder Management): urinal

## 2024-09-27 LAB
GLUCOSE BLDC GLUCOMTR-MCNC: 91 MG/DL (ref 70–130)
GLUCOSE BLDC GLUCOMTR-MCNC: 93 MG/DL (ref 70–130)

## 2024-09-27 PROCEDURE — 97530 THERAPEUTIC ACTIVITIES: CPT

## 2024-09-27 PROCEDURE — 97116 GAIT TRAINING THERAPY: CPT

## 2024-09-27 PROCEDURE — 97110 THERAPEUTIC EXERCISES: CPT

## 2024-09-27 PROCEDURE — 97535 SELF CARE MNGMENT TRAINING: CPT

## 2024-09-27 PROCEDURE — 82948 REAGENT STRIP/BLOOD GLUCOSE: CPT

## 2024-09-27 RX ADMIN — ASPIRIN 81 MG: 81 TABLET, COATED ORAL at 08:40

## 2024-09-27 RX ADMIN — EMPAGLIFLOZIN 10 MG: 10 TABLET, FILM COATED ORAL at 08:40

## 2024-09-27 RX ADMIN — SPIRONOLACTONE 12.5 MG: 25 TABLET ORAL at 08:40

## 2024-09-27 RX ADMIN — METOPROLOL TARTRATE 50 MG: 50 TABLET, FILM COATED ORAL at 20:37

## 2024-09-27 RX ADMIN — Medication 1000 MCG: at 08:40

## 2024-09-27 RX ADMIN — APIXABAN 5 MG: 5 TABLET, FILM COATED ORAL at 08:40

## 2024-09-27 RX ADMIN — APIXABAN 5 MG: 5 TABLET, FILM COATED ORAL at 20:37

## 2024-09-27 RX ADMIN — NICOTINE 1 PATCH: 21 PATCH TRANSDERMAL at 08:40

## 2024-09-27 RX ADMIN — METOPROLOL TARTRATE 50 MG: 50 TABLET, FILM COATED ORAL at 08:40

## 2024-09-27 RX ADMIN — ATORVASTATIN CALCIUM 40 MG: 40 TABLET, FILM COATED ORAL at 08:40

## 2024-09-27 NOTE — PROGRESS NOTES
Occupational Therapy:    Physical Therapy: Individual: 90 minutes.    Speech Language Pathology:    Signed by: Josiane Nguyễn PT

## 2024-09-27 NOTE — PLAN OF CARE
Goal Outcome Evaluation:  Plan of Care Reviewed With: patient        Problem: Rehabilitation (IRF) Plan of Care  Goal: Plan of Care Review  Outcome: Progressing  Flowsheets (Taken 9/27/2024 0122)  Plan of Care Reviewed With: patient  Goal: Patient-Specific Goal (Individualized)  Outcome: Progressing  Goal: Absence of New-Onset Illness or Injury  Outcome: Progressing  Intervention: Prevent Fall and Fall Injury  Description: Perform fall risk screening on admission and with change in status.  Communicate fall injury risk to the healthcare team.  Promote environmental and care measures specific to identified risk factors.  Develop strategies to prevent or minimize injury during falls and to get up safely after falling.  Perform regular intentional rounding to assess need for position change, pain assessment, personal needs.  Recent Flowsheet Documentation  Taken 9/27/2024 0000 by Ananya Parra RN  Safety Promotion/Fall Prevention: safety round/check completed  Taken 9/26/2024 2200 by Ananya Parra RN  Safety Promotion/Fall Prevention: safety round/check completed  Taken 9/26/2024 2000 by Ananya Parra RN  Safety Promotion/Fall Prevention: safety round/check completed  Goal: Optimal Comfort and Wellbeing  Outcome: Progressing  Goal: Home and Community Transition Plan Established  Outcome: Progressing     Problem: Diabetes Comorbidity  Goal: Blood Glucose Level Within Targeted Range  Outcome: Progressing     Problem: Fall Injury Risk  Goal: Absence of Fall and Fall-Related Injury  Outcome: Progressing  Intervention: Identify and Manage Contributors  Description: Develop a fall prevention plan with the patient and caregiver/family.  Provide reorientation, appropriate sensory stimulation and routines with changes in mental status to decrease risk of fall.  Promote use of personal vision and auditory aids.  Assess assistance level required for safe and effective self-care; provide support as needed, such as  toileting, mobilization. For age 65 and older, implement timed toileting with assistance.  Encourage physical activity, such as performance of mobility and self-care at highest level of patient ability, multicomponent exercise program and provision of appropriate assistive devices.  If fall occurs, assess the severity of injury; implement fall injury protocol. Determine the cause and revise fall injury prevention plan.  Regularly review medication contribution to fall risk; adjust medication administration times to minimize risk of falling.  Consider risk related to polypharmacy and age.  Balance adequate pain management with potential for oversedation.  Recent Flowsheet Documentation  Taken 9/27/2024 0000 by Ananya Parra RN  Medication Review/Management: medications reviewed  Taken 9/26/2024 2200 by Ananya Parra RN  Medication Review/Management: medications reviewed  Taken 9/26/2024 2000 by Ananya Parra RN  Medication Review/Management: medications reviewed  Intervention: Promote Injury-Free Environment  Description: Provide a safe, barrier-free environment that encourages independent activity.  Keep care area uncluttered and well-lighted.  Determine need for increased observation or monitoring.  Avoid use of devices that minimize mobility, such as restraints or indwelling urinary catheter.  Recent Flowsheet Documentation  Taken 9/27/2024 0000 by Ananya Parra RN  Safety Promotion/Fall Prevention: safety round/check completed  Taken 9/26/2024 2200 by Ananya Parra RN  Safety Promotion/Fall Prevention: safety round/check completed  Taken 9/26/2024 2000 by Ananya Parra RN  Safety Promotion/Fall Prevention: safety round/check completed     Problem: Skin Injury Risk Increased  Goal: Skin Health and Integrity  Outcome: Progressing     Problem: Adjustment to Stroke (Stroke Rehabilitation)  Goal: Optimal Adjustment to Stroke  Outcome: Progressing     Problem: BADL (Basic Activities of Daily Living)  Impairment (Stroke Rehabilitation)  Goal: Optimal Safe BADL Performance  Outcome: Progressing     Problem: Bowel Elimination Management (Stroke Rehabilitation)  Goal: Effective Bowel Elimination/Continence  Outcome: Progressing     Problem: Cognitive Impairment (Stroke Rehabilitation)  Goal: Optimal Cognitive Function  Outcome: Progressing     Problem: Communication Impairment (Stroke Rehabilitation)  Goal: Effective Communication Skills  Outcome: Progressing     Problem: IADL (Instrumental Activities of Daily Living) Impairment (Stroke Rehabilitation)  Goal: Optimal Safe IADL Performance  Outcome: Progressing     Problem: Mobility Impairment (Stroke Rehabilitation)  Goal: Optimal Mobility [place] and Safety  Outcome: Progressing     Problem: Movement and Motor Control Impairment (Stroke Rehabilitation)  Goal: Optimal Movement and Motor Control  Outcome: Progressing     Problem: Sensory Perceptual Impairment (Stroke Rehabilitation)  Goal: Optimal Sensory Perceptual Status  Outcome: Progressing     Problem: Sexuality and Intimacy (Stroke Rehabilitation)  Goal: Maintains Intimacy/Sexual Expression  Outcome: Progressing     Problem: Spasticity (Stroke Rehabilitation)  Goal: Effective Spasticity Management  Outcome: Progressing     Problem: Swallowing Impairment (Stroke Rehabilitation)  Goal: Optimal Oral Motor and Swallow Ability  Outcome: Progressing     Problem: Urinary Elimination Management (Stroke Rehabilitation)  Goal: Effective Urinary Elimination/Continence  Outcome: Progressing

## 2024-09-27 NOTE — THERAPY TREATMENT NOTE
Inpatient Rehabilitation - Occupational Therapy Treatment Note     Tutor Key     Patient Name: Anshul Andrews  : 1955  MRN: [id number]    Today's Date: 2024                 Admit Date: 2024       No diagnosis found.    Patient Active Problem List   Diagnosis    CVA (cerebral vascular accident)       History reviewed. No pertinent past medical history.    History reviewed. No pertinent surgical history.          IRF OT ASSESSMENT FLOWSHEET (Last 12 Hours)       IRF OT Evaluation and Treatment       Row Name 24 1109          OT Time and Intention    Document Type daily treatment  -TM     Mode of Treatment occupational therapy  -TM     Patient Effort good  -TM     Symptoms Noted During/After Treatment none  -TM       Row Name 24 1109          General Information    General Observations of Patient Pt agreeable for therapy.  -TM     Existing Precautions/Restrictions fall  -TM       Row Name 24 1109          Cognition/Psychosocial    Orientation Status (Cognition) oriented x 4  -TM     Follows Commands (Cognition) WFL  -TM       Row Name 24 1109          Lower Body Dressing    [place] Level (Lower Body Dressing) standby assist;contact guard assist;verbal cues  -TM     Position (Lower Body Dressing) edge of bed sitting  -TM       Row Name 24 1109          Grooming    [place] Level (Grooming) set up  -TM     Position (Grooming) supported sitting  -TM       Row Name 24 1109          Bed-Chair Transfer    Bed-Chair [place] (Transfers) supervision;standby assist;verbal cues  -TM     Assistive Device (Bed-Chair Transfers) wheelchair  -TM       Row Name 24 1109          Chair-Bed Transfer    Chair-Bed [place] (Transfers) supervision;standby assist;verbal cues  -TM     Assistive Device (Chair-Bed Transfers) wheelchair  -TM       Row Name 24 1109          Motor Skills    Motor Skills coordination;functional endurance;motor control/coordination  interventions  -TM     Motor Control/Coordination Interventions therapeutic exercise/ROM;fine motor manipulation/dexterity activities;gross motor coordination activities;other (see comments)  BUE ther ex/act, GMC/FMC, bilateral coord  -TM               User Key  (r) = Recorded By, (t) = Taken By, (c) = Cosigned By      Initials Name Effective Dates     Melisa Alaniz OT 06/16/21 -                      Occupational Therapy Education       Title: PT OT SLP Therapies (Done)       Topic: Occupational Therapy (Done)       Point: ADL training (Done)       Description:   Instruct learner(s) on proper safety adaptation and remediation techniques during self care or transfers.   Instruct in proper use of assistive devices.                  Learning Progress Summary             Patient Acceptance, E,D, VU,NR by TM at 9/27/2024 1111    Acceptance, E,D, VU,NR by TM at 9/26/2024 1314    Acceptance, E,D, VU,NR by TM at 9/25/2024 1018                         Point: Precautions (Done)       Description:   Instruct learner(s) on prescribed precautions during self-care and functional transfers.                  Learning Progress Summary             Patient Acceptance, E,D, VU,NR by TM at 9/27/2024 1111    Acceptance, E,D, VU,NR by TM at 9/26/2024 1314    Acceptance, E,D, VU,NR by TM at 9/25/2024 1018                                         User Key       Initials Effective Dates Name Provider Type Discipline     06/16/21 -  Melisa Alaniz, OT Occupational Therapist OT                        OT Recommendation and Plan    Planned Therapy Interventions (OT): activity tolerance training, adaptive equipment training, BADL retraining, IADL retraining, neuromuscular control/coordination retraining, occupation/activity based interventions, patient/caregiver education/training, ROM/therapeutic exercise, strengthening exercise, transfer/mobility retraining                    Time Calculation:      Time Calculation- OT        Row Name 09/27/24 1111             Time Calculation- OT    OT Start Time 0745  -TM      OT Stop Time 0915  -TM      OT Time Calculation (min) 90 min  -TM      Total Timed Code Minutes- OT 90 minute(s)  -TM      OT Non-Billable Time (min) 15 min  -TM                User Key  (r) = Recorded By, (t) = Taken By, (c) = Cosigned By      Initials Name Provider Type     Melisa Alaniz OT Occupational Therapist                  Therapy Charges for Today       Code Description Service Date Service Provider Modifiers Qty    52226946862 HC OT SELF CARE/MGMT/TRAIN EA 15 MIN 9/26/2024 Melisa Alaniz, OT GO 2    17120629220 HC OT THERAPEUTIC ACT EA 15 MIN 9/26/2024 Melisa Alaniz, OT GO 2    75194673554 HC OT THER PROC EA 15 MIN 9/26/2024 Melisa Alaniz, OT GO 2    86377007195 HC OT SELF CARE/MGMT/TRAIN EA 15 MIN 9/27/2024 Melisa Alaniz, OT GO 1    78384312535 HC OT THERAPEUTIC ACT EA 15 MIN 9/27/2024 Melisa Alaniz, OT GO 2    71584848827 HC OT THER PROC EA 15 MIN 9/27/2024 Melisa Alaniz, OT GO 3                     Melisa Alaniz OT  9/27/2024

## 2024-09-27 NOTE — PROGRESS NOTES
Physical Medicine and Rehabilitation  Inpatient Rehabilitation Interdisciplinary Plan of Care    Demographics            Age: 69Y            Gender: Male    Admission Date: 9/24/2024 2:44:00 PM  Rehabilitation Diagnosis:  CVA    Plan of Care  Anticipated Discharge Date/Estimated Length of Stay: 10-2-24  Anticipated Discharge Destination: Community discharge with assistance  Discharge Plan : Pt plans to go home with son at discharge who lives in  [place], [place].  Medical Necessity Expected Level Rationale: good  Intensity and Duration: an average of 3 hours/5 days per week  Medical Supervision and 24 Hour Rehab Nursing: x  Physical Therapy: x  PT Intensity/Duration: PT 1.5 hours per day/5 days per week  Occupational Therapy: x  OT Intensity/Duration: OT 1.5 hours per day/5 days per week  Social Work: x  Therapeutic Recreation: x  Updated (if changes indicated)  No changes to plan.    Based on the patient's medical and functional status, their prognosis and  expected level of functional improvement is: good    Interdisciplinary Problem/Goals/Status  Mobility    [PT] Bed/Chair/Wheelchair (Active)  Current Status (9/25/2024 12:00:00 AM): SBA  Weekly Goal: n/a  Discharge Goal: independent    [PT] Stairs (Active)  Current Status (9/25/2024 12:00:00 AM): TBA  Weekly Goal: n/a  Discharge Goal: 12 stairs, 2 handrails, SBA    [PT] Walk (Active)  Current Status (9/25/2024 12:00:00 AM): 300 ft, no assistive device, SBA  Weekly Goal: n/a  Discharge Goal: 300 ft, mod independent, no assistive device    [RN] Bed/Chair/Wheelchair (Active)  Current Status (9/24/2024 12:00:00 AM): Increase physical activity  Weekly Goal: Joint mobility maintained  Discharge Goal: Demonstrates use of adaptive equipment    Self Care    [OT] Toileting (Active)  Current Status (9/25/2024 8:15:00 AM): Neftali/CGA  Weekly Goal: CGA  Discharge Goal: supervision    Safety    [RN] Potential for Injury (Active)  Current Status (9/24/2024 12:00:00 AM): Free from  injury this shift  Weekly Goal: Prevent falls this week  Discharge Goal: Identifies measures to prevent injury; no falls this  hospitalization    Medical Problems  Afib, HTN, HFrEF    Comments: Pt plans to go home with son Yogesh at discharge.    Signed by: Valeriy Brandon MD

## 2024-09-27 NOTE — PROGRESS NOTES
Rehabilitation Nursing  Inpatient Rehabilitation Plan of Care Note    Plan of Care  Mobility    Bed/Chair/Wheelchair (Active)  Current Status (9/24/2024 12:00:00 AM): Increase physical activity  Weekly Goal: Joint mobility maintained  Discharge Goal: Demonstrates use of adaptive equipment    Safety    Potential for Injury (Active)  Current Status (9/24/2024 12:00:00 AM): Free from injury this shift  Weekly Goal: Prevent falls this week  Discharge Goal: Identifies measures to prevent injury; no falls this  hospitalization    Signed by: Ananya Parra RN

## 2024-09-27 NOTE — SIGNIFICANT NOTE
09/27/24 9206   Plan   Plan Attempted to contact son Yogesh [phone] without success; voicemail is not set-up.  Pt requested assisted living and housing information that is not low-income for [place].  Provided information for [facility] and [facility] assisted living in [place] for future needs.  Provided some information about where to look for apartments and number for landlord at [facility].  Pt will go to son's home at discharge and he will plan to get his own home or apartment in the future.

## 2024-09-27 NOTE — THERAPY TREATMENT NOTE
Inpatient Rehabilitation - Physical Therapy Treatment Note        Thom     Patient Name: Anshul Andrews  : 1955  MRN: [id number]    Today's Date: 2024                    Admit Date: 2024      Visit Dx:   No diagnosis found.    Patient Active Problem List   Diagnosis    CVA (cerebral vascular accident)       History reviewed. No pertinent past medical history.    History reviewed. No pertinent surgical history.    PT ASSESSMENT (Last 12 Hours)       IRF PT Evaluation and Treatment       Row Name 24 1016          PT Time and Intention    Document Type daily treatment  -     Mode of Treatment physical therapy  -     Total Minutes, Physical Therapy 90  -       Row Name 24 1016          General Information    General Observations of Patient Pt agreeable with treatment this date, pleasant and cooperative with therapy.  -     Existing Precautions/Restrictions fall  -       Row Name 24 1016          Bed Mobility    Bed Mobility supine-sit  -     Supine-Sit [place] (Bed Mobility) independent  -       Row Name 24 1016          Transfer Assessment/Treatment    Transfers sit-stand transfer;stand-sit transfer  -[facility] Name 24 1016          Bed-Chair Transfer    Bed-Chair [place] (Transfers) standby assist;contact guard  -[facility] Name 24 1016          Sit-Stand Transfer    Sit-Stand [place] (Transfers) supervision;contact guard  [facility]     Assistive Device (Sit-Stand Transfers) --  // [facility] Name 24 1016          Stand-Sit Transfer    Stand-Sit [place] (Transfers) supervision;contact guard  [facility]     Assistive Device (Stand-Sit Transfers) other (see comments)  // [facility] Name 24 1016          Gait/Stairs (Locomotion)    Gait/Stairs Locomotion gait/ambulation independence;stairs negotiation  -     [place] Level (Gait) contact guard;standby assist  -     Patient was able to Ambulate yes  -      Distance in Feet (Gait) 300  300+  -     Negotiation (Stairs) ascending technique;descending technique  -     [place] Level (Stairs) contact guard;stand by assist  -     Handrail Location (Stairs) both sides  -     Number of Steps (Stairs) 25  -KH     Ascending Technique (Stairs) step-over-step  -     Descending Technique (Stairs) step-over-step  -       Row Name 09/27/24 1016          Balance    Balance Interventions standing;dynamic;static;foam;balance board;single limb stance  bean bag toss on blue foam  -       Row Name 09/27/24 1016          Motor Skills    Therapeutic Exercise hip;ankle  please see flow sheet for detailed TE with standing, supine, and sitting therex with LE's for strengthening/engagement to assist with increased balance/coordination/motor planning  -       Row Name 09/27/24 1016          Positioning and Restraints    Pre-Treatment Position in bed  -     Post Treatment Position bed  -     In Bed supine;call light within reach  -       Row Name 09/27/24 1016          Daily Progress Summary (PT)    Daily Progress Summary (PT) Pt demonstrates minimal deficits, tolerated therapy well with 5/10 rating of fatigue.  -               User Key  (r) = Recorded By, (t) = Taken By, (c) = Cosigned By      Initials Name Provider Type    Josiane Zepeda, PT Physical Therapist                     Physical Therapy Education       Title: PT OT SLP Therapies (Done)       Topic: Physical Therapy (Done)       Point: Mobility training (Done)       Learning Progress Summary             Patient Acceptance, E,D, VU by  at 9/26/2024 1115    Acceptance, E,D, VU,NR by  at 9/25/2024 1502                         Point: Home exercise program (Done)       Learning Progress Summary             Patient Acceptance, E,D, VU by  at 9/26/2024 1115    Acceptance, E,D, VU,NR by  at 9/25/2024 1502                         Point: Body mechanics (Done)       Learning Progress Summary              Patient Acceptance, E,D, VU by  at 9/26/2024 1115    Acceptance, E,D, VU,NR by  at 9/25/2024 1502                         Point: Precautions (Done)       Learning Progress Summary             Patient Acceptance, E,D, VU by  at 9/26/2024 1115    Acceptance, E,D, VU,NR by  at 9/25/2024 1502                                         User Key       Initials Effective Dates Name Provider Type [facility] 06/16/21 -  Angie Cullen, PT Physical Therapist PT                    PT Recommendation and Plan          Daily Progress Summary (PT)  Daily Progress Summary (PT): Pt demonstrates minimal deficits, tolerated therapy well with 5/10 rating of fatigue.               Time Calculation:      PT Charges       Row Name 09/27/24 1409 09/27/24 1407          Time Calculation    Start Time 1303  -KH 1016  -KH     Stop Time 1318  PM session  -KH 1131  AM session  -     Time Calculation (min) 15 min  -KH 75 min  -KH     PT Received On 09/27/24  - 09/27/24  -               User Key  (r) = Recorded By, (t) = Taken By, (c) = Cosigned By      Initials Name Provider Type     Josiane Nguyễn, PT Physical Therapist                    Therapy Charges for Today       Code Description Service Date Service Provider Modifiers Qty    41192613068 HC PT THER PROC EA 15 MIN 9/27/2024 Josiane Nguyễn, PT GP 1    58365671014 HC PT THER PROC EA 15 MIN 9/27/2024 Josiane Nguyễn, PT GP 1    04233844817 HC PT THERAPEUTIC ACT EA 15 MIN 9/27/2024 Josiane Nguyễn, PT GP 2    86780500167 HC PT THERAPEUTIC ACT EA 15 MIN 9/27/2024 Josiane Nguyễn, PT GP 1    31926823945 HC GAIT TRAINING EA 15 MIN 9/27/2024 Josiane Nguyễn, PT GP 1              PT G-Codes  AM-PAC 6 Clicks Score (PT): 18      Josiane Nguyễn, PT  9/27/2024

## 2024-09-27 NOTE — PROGRESS NOTES
Case Management  Inpatient Rehabilitation Team Conference    Conference Date/Time: 9/26/2024 6:22:21 AM    Team Conference Attendees:  MD Yeimi Leung SW Jessica Bill, RN,   TIFFANIE Mchugh, PT  Melisa Alaniz OT    Demographics            Age: 69Y            Gender: Male    Admission Date: 9/24/2024 2:44:00 PM  Rehabilitation Diagnosis:  CVA  Comorbidities:      Plan of Care  Anticipated Discharge Date/Estimated Length of Stay: 14 days  Anticipated Discharge Destination: Community discharge with assistance  Discharge Plan : Pt plans to go home with son at discharge who lives in  [place], [place].  Medical Necessity Expected Level Rationale: good  Intensity and Duration: an average of 3 hours/5 days per week  Medical Supervision and 24 Hour Rehab Nursing: x  Physical Therapy: x  PT Intensity/Duration: PT 1.5 hours per day/5 days per week  Occupational Therapy: x  OT Intensity/Duration: OT 1.5 hours per day/5 days per week  Social Work: x  Therapeutic Recreation: x  Updated (if changes indicated)    Anticipated Discharge Date/Estimated Length of Stay:   10-2-24      Discharge Plan of Care:    Based on the patient's medical and functional status, their prognosis and  expected level of functional improvement is: good      Interdisciplinary Problem/Goals/Status  Mobility    [RN] Bed/Chair/Wheelchair(Active)  Current Status(09/24/2024): Increase physical activity  Weekly Goal(10/01/2024): Joint mobility maintained  Discharge Goal: Demonstrates use of adaptive equipment    [PT] Bed/Chair/Wheelchair(Active)  Current Status(09/25/2024): SBA  Weekly Goal(09/25/2024): n/a  Discharge Goal: independent    [PT] Walk(Active)  Current Status(09/25/2024): 300 ft, no assistive device, SBA  Weekly Goal(09/25/2024): n/a  Discharge Goal: 300 ft, mod independent, no assistive device    [PT] Stairs(Active)  Current Status(09/25/2024): TBA  Weekly Goal(09/25/2024): n/a  Discharge  Goal: 12 stairs, 2 handrails, SBA        Safety    [RN] Potential for Injury(Active)  Current Status(09/24/2024): Free from injury this shift  Weekly Goal(10/01/2024): Prevent falls this week  Discharge Goal: Identifies measures to prevent injury; no falls this  hospitalization        Self Care    [OT] Toileting(Active)  Current Status(09/25/2024): Neftali/CGA  Weekly Goal(10/01/2024): CGA  Discharge Goal: supervision    Comments: Pt plans to go home with cathi Zuñiga at discharge.    Signed by: MIGUEL Collins    Physician CoSigned By: Valeriy Brandon 09/27/2024 09:15:34

## 2024-09-27 NOTE — PLAN OF CARE
Goal Outcome Evaluation:  Plan of Care Reviewed With: patient                Problem: Rehabilitation (IRF) Plan of Care  Goal: Plan of Care Review  Outcome: Progressing  Flowsheets (Taken 9/27/2024 1001)  Plan of Care Reviewed With: patient  Goal: Patient-Specific Goal (Individualized)  Outcome: Progressing  Goal: Absence of New-Onset Illness or Injury  Outcome: Progressing  Intervention: Prevent Fall and Fall Injury  Recent Flowsheet Documentation  Taken 9/27/2024 0800 by Huseyin Llanos RN  Safety Promotion/Fall Prevention: safety round/check completed  Intervention: Prevent Infection  Recent Flowsheet Documentation  Taken 9/27/2024 0800 by Huseyin Llanos RN  Infection Prevention:   hand hygiene promoted   rest/sleep promoted  Intervention: Prevent VTE (Venous Thromboembolism)  Recent Flowsheet Documentation  Taken 9/27/2024 0800 by Huseyin Llanos RN  VTE Prevention/Management: (eliquis) other (see comments)  Goal: Optimal Comfort and Wellbeing  Outcome: Progressing  Goal: Home and Community Transition Plan Established  Outcome: Progressing     Problem: Diabetes Comorbidity  Goal: Blood Glucose Level Within Targeted Range  Outcome: Progressing  Intervention: Monitor and Manage Glycemia  Recent Flowsheet Documentation  Taken 9/27/2024 0800 by Huseyin Llanos RN  Glycemic Management: blood glucose monitored     Problem: Fall Injury Risk  Goal: Absence of Fall and Fall-Related Injury  Outcome: Progressing  Intervention: Identify and Manage Contributors  Recent Flowsheet Documentation  Taken 9/27/2024 0800 by Huseyin Llanos RN  Medication Review/Management: medications reviewed  Intervention: Promote Injury-Free Environment  Recent Flowsheet Documentation  Taken 9/27/2024 0800 by Huseyin Llanos RN  Safety Promotion/Fall Prevention: safety round/check completed     Problem: Skin Injury Risk Increased  Goal: Skin Health and Integrity  Outcome: Progressing  Intervention: Optimize Skin Protection  Recent Flowsheet  Documentation  Taken 9/27/2024 0800 by Huseyin Llanos RN  Pressure Reduction Techniques:   frequent weight shift encouraged   heels elevated off bed   weight shift assistance provided  Pressure Reduction Devices:   pressure-redistributing mattress utilized   positioning supports utilized   heel offloading device utilized  Skin Protection: adhesive use limited     Problem: Adjustment to Stroke (Stroke Rehabilitation)  Goal: Optimal Adjustment to Stroke  Outcome: Progressing     Problem: BADL (Basic Activities of Daily Living) Impairment (Stroke Rehabilitation)  Goal: Optimal Safe BADL Performance  Outcome: Progressing     Problem: Bowel Elimination Management (Stroke Rehabilitation)  Goal: Effective Bowel Elimination/Continence  Outcome: Progressing  Intervention: Promote Bowel Elimination and Continence  Recent Flowsheet Documentation  Taken 9/27/2024 0800 by Huseyin Llanos RN  Device Use (Bowel Management): bedside commode     Problem: Cognitive Impairment (Stroke Rehabilitation)  Goal: Optimal Cognitive Function  Outcome: Progressing     Problem: Communication Impairment (Stroke Rehabilitation)  Goal: Effective Communication Skills  Outcome: Progressing  Intervention: Optimize Communication Skills  Recent Flowsheet Documentation  Taken 9/27/2024 0800 by Huseyin Llanos RN  Communication Enhancement Strategies: call light answered in person     Problem: IADL (Instrumental Activities of Daily Living) Impairment (Stroke Rehabilitation)  Goal: Optimal Safe IADL Performance  Outcome: Progressing     Problem: Mobility Impairment (Stroke Rehabilitation)  Goal: Optimal Mobility [place] and Safety  Outcome: Progressing     Problem: Movement and Motor Control Impairment (Stroke Rehabilitation)  Goal: Optimal Movement and Motor Control  Outcome: Progressing     Problem: Sensory Perceptual Impairment (Stroke Rehabilitation)  Goal: Optimal Sensory Perceptual Status  Outcome: Progressing  Intervention: Facilitate Optimal  Sensory Perceptual Status  Recent Flowsheet Documentation  Taken 9/27/2024 0800 by Huseyin Llanos RN  Sensory Impairment Compensatory Techniques:   frequent position change encouraged   pressure relief techniques facilitated   skin protection techniques promoted     Problem: Sexuality and Intimacy (Stroke Rehabilitation)  Goal: Maintains Intimacy/Sexual Expression  Outcome: Progressing     Problem: Spasticity (Stroke Rehabilitation)  Goal: Effective Spasticity Management  Outcome: Progressing     Problem: Swallowing Impairment (Stroke Rehabilitation)  Goal: Optimal Oral Motor and Swallow Ability  Outcome: Progressing     Problem: Urinary Elimination Management (Stroke Rehabilitation)  Goal: Effective Urinary Elimination/Continence  Outcome: Progressing  Intervention: Promote Urinary Elimination and Continence  Recent Flowsheet Documentation  Taken 9/27/2024 0800 by Huseyin Llanos RN  Assistive Device Use (Bladder Management):   urinal   bedside commode

## 2024-09-28 LAB
GLUCOSE BLDC GLUCOMTR-MCNC: 108 MG/DL (ref 70–130)
GLUCOSE BLDC GLUCOMTR-MCNC: 115 MG/DL (ref 70–130)

## 2024-09-28 PROCEDURE — 97116 GAIT TRAINING THERAPY: CPT

## 2024-09-28 PROCEDURE — 97535 SELF CARE MNGMENT TRAINING: CPT

## 2024-09-28 PROCEDURE — 82948 REAGENT STRIP/BLOOD GLUCOSE: CPT

## 2024-09-28 PROCEDURE — 99231 SBSQ HOSP IP/OBS SF/LOW 25: CPT | Performed by: FAMILY MEDICINE

## 2024-09-28 PROCEDURE — 97530 THERAPEUTIC ACTIVITIES: CPT

## 2024-09-28 PROCEDURE — 97110 THERAPEUTIC EXERCISES: CPT

## 2024-09-28 RX ADMIN — NICOTINE 1 PATCH: 21 PATCH TRANSDERMAL at 08:40

## 2024-09-28 RX ADMIN — ACETAMINOPHEN 650 MG: 325 TABLET ORAL at 20:43

## 2024-09-28 RX ADMIN — SPIRONOLACTONE 12.5 MG: 25 TABLET ORAL at 08:39

## 2024-09-28 RX ADMIN — APIXABAN 5 MG: 5 TABLET, FILM COATED ORAL at 08:40

## 2024-09-28 RX ADMIN — EMPAGLIFLOZIN 10 MG: 10 TABLET, FILM COATED ORAL at 08:40

## 2024-09-28 RX ADMIN — ASPIRIN 81 MG: 81 TABLET, COATED ORAL at 08:40

## 2024-09-28 RX ADMIN — Medication 1000 MCG: at 08:40

## 2024-09-28 RX ADMIN — APIXABAN 5 MG: 5 TABLET, FILM COATED ORAL at 20:43

## 2024-09-28 RX ADMIN — METOPROLOL TARTRATE 50 MG: 50 TABLET, FILM COATED ORAL at 20:43

## 2024-09-28 RX ADMIN — METOPROLOL TARTRATE 50 MG: 50 TABLET, FILM COATED ORAL at 08:40

## 2024-09-28 RX ADMIN — ATORVASTATIN CALCIUM 40 MG: 40 TABLET, FILM COATED ORAL at 08:39

## 2024-09-28 NOTE — PROGRESS NOTES
Rehabilitation Nursing  Inpatient Rehabilitation Plan of Care Note    Plan of Care  Mobility    Bed/Chair/Wheelchair (Active)  Current Status (9/24/2024 12:00:00 AM): Increase physical activity  Weekly Goal: Joint mobility maintained  Discharge Goal: Demonstrates use of adaptive equipment    Safety    Potential for Injury (Active)  Current Status (9/24/2024 12:00:00 AM): Free from injury this shift  Weekly Goal: Prevent falls this week  Discharge Goal: Identifies measures to prevent injury; no falls this  hospitalization    Signed by: Huseyin Llanos RN

## 2024-09-28 NOTE — PLAN OF CARE
Problem: Rehabilitation (IRF) Plan of Care  Goal: Plan of Care Review  Outcome: Progressing  Flowsheets (Taken 9/28/2024 7959)  Progress: no change  Plan of Care Reviewed With: patient  Goal: Patient-Specific Goal (Individualized)  Outcome: Progressing  Goal: Absence of New-Onset Illness or Injury  Outcome: Progressing  Goal: Optimal Comfort and Wellbeing  Outcome: Progressing  Goal: Home and Community Transition Plan Established  Outcome: Progressing     Problem: Diabetes Comorbidity  Goal: Blood Glucose Level Within Targeted Range  Outcome: Progressing     Problem: Fall Injury Risk  Goal: Absence of Fall and Fall-Related Injury  Outcome: Progressing     Problem: Skin Injury Risk Increased  Goal: Skin Health and Integrity  Outcome: Progressing     Problem: Adjustment to Stroke (Stroke Rehabilitation)  Goal: Optimal Adjustment to Stroke  Outcome: Progressing     Problem: BADL (Basic Activities of Daily Living) Impairment (Stroke Rehabilitation)  Goal: Optimal Safe BADL Performance  Outcome: Progressing     Problem: Bowel Elimination Management (Stroke Rehabilitation)  Goal: Effective Bowel Elimination/Continence  Outcome: Progressing     Problem: Cognitive Impairment (Stroke Rehabilitation)  Goal: Optimal Cognitive Function  Outcome: Progressing     Problem: Communication Impairment (Stroke Rehabilitation)  Goal: Effective Communication Skills  Outcome: Progressing     Problem: IADL (Instrumental Activities of Daily Living) Impairment (Stroke Rehabilitation)  Goal: Optimal Safe IADL Performance  Outcome: Progressing     Problem: Mobility Impairment (Stroke Rehabilitation)  Goal: Optimal Mobility [place] and Safety  Outcome: Progressing     Problem: Movement and Motor Control Impairment (Stroke Rehabilitation)  Goal: Optimal Movement and Motor Control  Outcome: Progressing     Problem: Sensory Perceptual Impairment (Stroke Rehabilitation)  Goal: Optimal Sensory Perceptual Status  Outcome: Progressing      Problem: Sexuality and Intimacy (Stroke Rehabilitation)  Goal: Maintains Intimacy/Sexual Expression  Outcome: Progressing     Problem: Spasticity (Stroke Rehabilitation)  Goal: Effective Spasticity Management  Outcome: Progressing     Problem: Swallowing Impairment (Stroke Rehabilitation)  Goal: Optimal Oral Motor and Swallow Ability  Outcome: Progressing     Problem: Urinary Elimination Management (Stroke Rehabilitation)  Goal: Effective Urinary Elimination/Continence  Outcome: Progressing   Goal Outcome Evaluation:  Plan of Care Reviewed With: patient        Progress: no change

## 2024-09-28 NOTE — PROGRESS NOTES
Occupational Therapy: Individual: 105 minutes.    Physical Therapy:    Speech Language Pathology:    Signed by: Siri Beth, OT

## 2024-09-28 NOTE — PROGRESS NOTES
Occupational Therapy:    Physical Therapy: Individual: 90 minutes.  FTF time with pt    Speech Language Pathology:    Signed by: Josiane Nguyễn PT

## 2024-09-28 NOTE — PROGRESS NOTES
[facility]  PROGRESS NOTE     Patient Identification:  Name:  Anshul Andrews  Age:  69 y.o.  Sex:  male  :  1955  MRN:  [id number]  Visit Number:  [id number]  ROOM: [facility]     Primary Care Provider:  Provider, No Known    Length of stay in inpatient status:  4    Subjective     Chief Compliant: Status post right MCA distribution CVA    History of Presenting Illness: 69-year-old gentleman who is status post CVA.  Patient states he is doing very well.  Therapist states that his balance is still not great but is markedly improved.  Patient has no new complaints otherwise with the exception of having difficulty resting at night.    Objective     Current Hospital Meds:apixaban, 5 mg, Oral, Q12H  aspirin, 81 mg, Oral, Daily  atorvastatin, 40 mg, Oral, Daily  empagliflozin, 10 mg, Oral, Daily  metoprolol tartrate, 50 mg, Oral, Q12H  nicotine, 1 patch, Transdermal, Q24H  spironolactone, 12.5 mg, Oral, Daily  vitamin B-12, 1,000 mcg, Oral, Daily       ----------------------------------------------------------------------------------------------------------------------  Vital Signs:  Temp:  [95.5 °F (35.3 °C)-97.9 °F (36.6 °C)] 97.9 °F (36.6 °C)  Heart Rate:  [68-77] 77  Resp:  [18-20] 20  BP: (121-132)/(68-73) 132/68  SpO2:  [96 %] 96 %  on   ;   Device (Oxygen Therapy): room air  Body mass index is 29.47 kg/m².    Wt Readings from Last 3 Encounters:   24 116 kg (255 lb)     Intake & Output (last 3 days)         /28 0700 09/28 0701  09/29 0700    P.O. 720 1440 2040     Total Intake(mL/kg) 720 (6.2) 1440 (12.4) 2040 (17.6)     Net +720 +1440 +2040             Urine Unmeasured Occurrence 2 x 7 x 6 x           Diet: Diabetic, Regular/House; Consistent Carbohydrate; Fluid Consistency: Thin (IDDSI 0)  ----------------------------------------------------------------------------------------------------------------------  Physical  "exam:  Constitutional: No acute distress  HEENT: Normocephalic atraumatic  Neck:   Supple  Cardiovascular: Regular rate and rhythm  Pulmonary/Chest: Clear to auscultation  Abdominal: Positive bowel sounds soft.   Musculoskeletal: Patient appears to have arthritic changes in the left knee  Neurological: 3-4 out of 5 strength on the left  Skin: No rash  Peripheral vascular: No edema  Genitourinary:  ----------------------------------------------------------------------------------------------------------------------    Last echocardiogram:    ----------------------------------------------------------------------------------------------------------------------  Results from last 7 days   Lab Units 09/25/24  0010   WBC 10*3/mm3 13.42*   HEMOGLOBIN g/dL 17.0   HEMATOCRIT % 54.1*   MCV fL 85.5   MCHC g/dL 31.4*   PLATELETS 10*3/mm3 291         Results from last 7 days   Lab Units 09/25/24  0010   SODIUM mmol/L 140   POTASSIUM mmol/L 4.6   CHLORIDE mmol/L 106   CO2 mmol/L 23.5   BUN mg/dL 32*   CREATININE mg/dL 1.17   CALCIUM mg/dL 9.2   GLUCOSE mg/dL 91   ALBUMIN g/dL 3.8   BILIRUBIN mg/dL 0.6   ALK PHOS U/L 128*   AST (SGOT) U/L 34   ALT (SGPT) U/L 44*   Estimated Creatinine Clearance: 86.3 mL/min (by C-G formula based on SCr of 1.17 mg/dL).  No results found for: \"AMMONIA\"              Glucose   Date/Time Value Ref Range Status   09/28/2024 0619 115 70 - 130 mg/dL Final   09/27/2024 1541 91 70 - 130 mg/dL Final   09/27/2024 0612 93 70 - 130 mg/dL Final   09/26/2024 1549 120 70 - 130 mg/dL Final   09/26/2024 0633 96 70 - 130 mg/dL Final   09/25/2024 2014 96 70 - 130 mg/dL Final   09/25/2024 1603 90 70 - 130 mg/dL Final   09/25/2024 1051 105 70 - 130 mg/dL Final     No results found for: \"TSH\", \"FREET4\"  No results found for: \"PREGTESTUR\", \"PREGSERUM\", \"HCG\", \"HCGQUANT\"  Pain Management Panel           No data to display              Brief Urine Lab Results       None          No results found for: \"BLOODCX\"      No " "results found for: \"URINECX\"  No results found for: \"WOUNDCX\"  No results found for: \"STOOLCX\"        I have personally looked at the labs and they are summarized above.  ----------------------------------------------------------------------------------------------------------------------  Detailed radiology reports for the last 24 hours:    Imaging Results (Last 24 Hours)       ** No results found for the last 24 hours. **          Final impressions for the last 30 days of radiology reports:    No radiology results for the last 30 days.  I have personally looked at the radiology images and read the final radiology report.    Assessment & Plan    Status post right MCA distribution CVA--currently on Eliquis.  Patient independent with bed mobility; standby assist to contact-guard for transfers.  Able to ambulate 300+ feet with standby assistance to contact-guard navigated 25 steps with standby assist to contact-guard.  Is working to develop better balance with his therapy.  Requiring standby assist contact-guard for lower body dressing; set up for grooming; supervision standby assist for transfers    Atrial fibrillation currently appears to be in a sinus rhythm.  Is on Toprol and Eliquis.    Diabetes mellitus controlled continue Jardiance    Hypertension reasonably well-controlled    CHF with reduced EF compensated.  Patient is on beta-blocker therapy, Aldactone and Jardiance.  May benefit from further cardiac workup as an outpatient    Tobacco abuse recommend cessation    Cardiac thrombi continue Eliquis    Polycythemia last hemoglobin 17.    VTE Prophylaxis:   Eliqumichael Hillman MD  [facility]  09/28/24  09:44 EDT  "

## 2024-09-28 NOTE — PLAN OF CARE
Goal Outcome Evaluation:  Plan of Care Reviewed With: patient                Problem: Rehabilitation (IRF) Plan of Care  Goal: Plan of Care Review  Outcome: Progressing  Flowsheets (Taken 9/28/2024 1049)  Plan of Care Reviewed With: patient  Goal: Patient-Specific Goal (Individualized)  Outcome: Progressing  Goal: Absence of New-Onset Illness or Injury  Outcome: Progressing  Intervention: Prevent Fall and Fall Injury  Recent Flowsheet Documentation  Taken 9/28/2024 0800 by Huseyin Llanos RN  Safety Promotion/Fall Prevention: safety round/check completed  Intervention: Prevent Infection  Recent Flowsheet Documentation  Taken 9/28/2024 0800 by Huseyin Llanos RN  Infection Prevention:   hand hygiene promoted   rest/sleep promoted  Intervention: Prevent VTE (Venous Thromboembolism)  Recent Flowsheet Documentation  Taken 9/28/2024 0800 by Huseyin Llanos RN  VTE Prevention/Management: (eliquis) other (see comments)  Goal: Optimal Comfort and Wellbeing  Outcome: Progressing  Goal: Home and Community Transition Plan Established  Outcome: Progressing     Problem: Diabetes Comorbidity  Goal: Blood Glucose Level Within Targeted Range  Outcome: Progressing  Intervention: Monitor and Manage Glycemia  Recent Flowsheet Documentation  Taken 9/28/2024 0800 by Huseyin Llanos RN  Glycemic Management: blood glucose monitored     Problem: Fall Injury Risk  Goal: Absence of Fall and Fall-Related Injury  Outcome: Progressing  Intervention: Identify and Manage Contributors  Recent Flowsheet Documentation  Taken 9/28/2024 0800 by Huseyin Llanos RN  Medication Review/Management: medications reviewed  Intervention: Promote Injury-Free Environment  Recent Flowsheet Documentation  Taken 9/28/2024 0800 by Huseyin Llanos RN  Safety Promotion/Fall Prevention: safety round/check completed     Problem: Skin Injury Risk Increased  Goal: Skin Health and Integrity  Outcome: Progressing  Intervention: Optimize Skin Protection  Recent Flowsheet  Documentation  Taken 9/28/2024 0800 by Huseyin Llanos RN  Pressure Reduction Techniques:   frequent weight shift encouraged   heels elevated off bed   weight shift assistance provided  Pressure Reduction Devices:   pressure-redistributing mattress utilized   positioning supports utilized   heel offloading device utilized  Skin Protection: adhesive use limited     Problem: Adjustment to Stroke (Stroke Rehabilitation)  Goal: Optimal Adjustment to Stroke  Outcome: Progressing     Problem: BADL (Basic Activities of Daily Living) Impairment (Stroke Rehabilitation)  Goal: Optimal Safe BADL Performance  Outcome: Progressing     Problem: Bowel Elimination Management (Stroke Rehabilitation)  Goal: Effective Bowel Elimination/Continence  Outcome: Progressing  Intervention: Promote Bowel Elimination and Continence  Recent Flowsheet Documentation  Taken 9/28/2024 0800 by Huseyin Llanos RN  Device Use (Bowel Management): bedside commode     Problem: Cognitive Impairment (Stroke Rehabilitation)  Goal: Optimal Cognitive Function  Outcome: Progressing  Intervention: Optimize Cognitive Function  Recent Flowsheet Documentation  Taken 9/28/2024 0800 by Huseyin Llanos RN  Sensory Stimulation Regulation: care clustered     Problem: Communication Impairment (Stroke Rehabilitation)  Goal: Effective Communication Skills  Outcome: Progressing  Intervention: Optimize Communication Skills  Recent Flowsheet Documentation  Taken 9/28/2024 0800 by Huseyin Llanos RN  Communication Enhancement Strategies: call light answered in person     Problem: IADL (Instrumental Activities of Daily Living) Impairment (Stroke Rehabilitation)  Goal: Optimal Safe IADL Performance  Outcome: Progressing     Problem: Mobility Impairment (Stroke Rehabilitation)  Goal: Optimal Mobility [place] and Safety  Outcome: Progressing     Problem: Movement and Motor Control Impairment (Stroke Rehabilitation)  Goal: Optimal Movement and Motor Control  Outcome:  Progressing     Problem: Sensory Perceptual Impairment (Stroke Rehabilitation)  Goal: Optimal Sensory Perceptual Status  Outcome: Progressing  Intervention: Facilitate Optimal Sensory Perceptual Status  Recent Flowsheet Documentation  Taken 9/28/2024 0800 by Huseyin Llanos RN  Sensory Impairment Compensatory Techniques:   frequent position change encouraged   skin protection techniques promoted     Problem: Sexuality and Intimacy (Stroke Rehabilitation)  Goal: Maintains Intimacy/Sexual Expression  Outcome: Progressing     Problem: Spasticity (Stroke Rehabilitation)  Goal: Effective Spasticity Management  Outcome: Progressing     Problem: Swallowing Impairment (Stroke Rehabilitation)  Goal: Optimal Oral Motor and Swallow Ability  Outcome: Progressing     Problem: Urinary Elimination Management (Stroke Rehabilitation)  Goal: Effective Urinary Elimination/Continence  Outcome: Progressing  Intervention: Promote Urinary Elimination and Continence  Recent Flowsheet Documentation  Taken 9/28/2024 0800 by Huseyin Llanos RN  Assistive Device Use (Bladder Management):   urinal   bedside commode

## 2024-09-28 NOTE — THERAPY TREATMENT NOTE
Inpatient Rehabilitation - Occupational Therapy Treatment Note     Thom     Patient Name: Anshul Andrews  : 1955  MRN: [id number]    Today's Date: 2024                 Admit Date: 2024       No diagnosis found.    Patient Active Problem List   Diagnosis    CVA (cerebral vascular accident)       History reviewed. No pertinent past medical history.    History reviewed. No pertinent surgical history.          IRF OT ASSESSMENT FLOWSHEET (Last 12 Hours)       IRF OT Evaluation and Treatment       Row Name 24 1529 24 07       OT Time and Intention    Document Type daily treatment  -BC daily treatment  -BC    Mode of Treatment occupational therapy  -BC occupational therapy  -BC    Patient Effort good  -BC good  -BC    Symptoms Noted During/After Treatment fatigue  -BC none  -BC      Row Name 24 1529 24 07       General Information    Patient Profile Reviewed yes  -BC yes  -BC    Patient/Family/Caregiver Comments/Observations Pt. agreeable to OT this AM w/complaints of the early morning time.  -BC Pt. alert and agreeable to tx. this AM.  -BC    General Observations of Patient Pt. awake and up on EOB, agreeable to participate.  Pt. completed various ADL tasks, including self feeding with set up A only.  -BC --    Existing Precautions/Restrictions fall  -BC --    Comment, General Information Pt. sessions completed AM/PM with fatigue expressed by pt. after completing PT sessions.  Pt. required re scheduling of PM session due to fatigue.  Pt. napped and then was willing to cont. tx.  Pt. completed multiple therEx. including the hand bike (3 min. x 3) Pulley to address mild deficits noted in R shoulder due to old injury per pt. report, resistive clips addressing AROM, pinch strength.  Pt. request return to room for ball game.  -BC --      Row Name 24 152          Positioning and Restraints    Pre-Treatment Position other (comment)  -BC     Post Treatment Position wheelchair   -BC     In Wheelchair encouraged to call for assist  -BC               User Key  (r) = Recorded By, (t) = Taken By, (c) = Cosigned By      Initials Name Effective Dates    BC Siri Beth OT 06/16/21 -                      Occupational Therapy Education       Title: PT OT SLP Therapies (In Progress)       Topic: Occupational Therapy (In Progress)       Point: ADL training (In Progress)       Description:   Instruct learner(s) on proper safety adaptation and remediation techniques during self care or transfers.   Instruct in proper use of assistive devices.                  Learning Progress Summary             Patient Acceptance, E, NR by BC at 9/28/2024 1542    Acceptance, E,D, VU,NR by  at 9/27/2024 1111    Acceptance, E,D, VU,NR by TM at 9/26/2024 1314    Acceptance, E,D, VU,NR by TM at 9/25/2024 1018                         Point: Precautions (In Progress)       Description:   Instruct learner(s) on prescribed precautions during self-care and functional transfers.                  Learning Progress Summary             Patient Acceptance, E, NR by BC at 9/28/2024 1542    Acceptance, E,D, VU,NR by  at 9/27/2024 1111    Acceptance, E,D, VU,NR by  at 9/26/2024 1314    Acceptance, E,D, VU,NR by  at 9/25/2024 1018                                         User Key       Initials Effective Dates Name Provider Type Discipline     06/16/21 -  Melisa Alaniz, OT Occupational Therapist OT    BC 06/16/21 -  Siri Beth OT Occupational Therapist OT                        OT Recommendation and Plan                         Time Calculation:      Time Calculation- OT       Row Name 09/28/24 1543 09/28/24 1542          Time Calculation- OT    OT Start Time 1320  -BC 0700  -BC     OT Stop Time 1420  -BC 0745  -BC     OT Time Calculation (min) 60 min  -BC 45 min  -BC     OT Non-Billable Time (min) -- 15 min  -BC               User Key  (r) = Recorded By, (t) = Taken By, (c) = Cosigned By      Initials Name  Provider Type    BC Siri Beth OT Occupational Therapist                  Therapy Charges for Today       Code Description Service Date Service Provider Modifiers Qty    09873765356 HC OT THERAPEUTIC ACT EA 15 MIN 9/28/2024 Siri Beth OT GO 3    43841707735 HC OT SELF CARE/MGMT/TRAIN EA 15 MIN 9/28/2024 Siri Beth OT GO 3    31152362976 HC OT THERAPEUTIC ACT EA 15 MIN 9/28/2024 Siri Beth OT GO 1                     Siri Beth OT  9/28/2024

## 2024-09-28 NOTE — THERAPY TREATMENT NOTE
Inpatient Rehabilitation - Physical Therapy Treatment Note        Thom     Patient Name: Anshul Andrews  : 1955  MRN: [id number]    Today's Date: 2024                    Admit Date: 2024      Visit Dx:   No diagnosis found.    Patient Active Problem List   Diagnosis    CVA (cerebral vascular accident)       History reviewed. No pertinent past medical history.    History reviewed. No pertinent surgical history.    PT ASSESSMENT (Last 12 Hours)       IRF PT Evaluation and Treatment       Row Name 24 0800          PT Time and Intention    Document Type daily treatment  -     Mode of Treatment physical therapy  -     Patient/Family/Caregiver Comments/Observations Pt agreeable to PT treatment after OT session  -     Total Minutes, Physical Therapy 90  rest breaks, education, physician evaluation/assessment, transfers/transitions.  -       Row Name 24 0800          Bed Mobility    Bed Mobility supine-sit;sit-supine  -     Supine-Sit [place] (Bed Mobility) independent  -     Sit-Supine [place] (Bed Mobility) independent  -       Row Name 24 08          Transfer Assessment/Treatment    Transfers sit-stand transfer;stand-sit transfer;chair-bed transfer  -       Row Name 24 0800          Chair-Bed Transfer    Chair-Bed [place] (Transfers) supervision  -       Row Name 24 08          Sit-Stand Transfer    Sit-Stand [place] (Transfers) supervision;standby assist  -     Assistive Device (Sit-Stand Transfers) wheelchair;other (see comments)  // Psychiatric       Row Name 24 08          Stand-Sit Transfer    Stand-Sit [place] (Transfers) [facility]     Assistive Device (Stand-Sit Transfers) wheelchair;other (see comments)  // Psychiatric       Row Name 24 0800          Gait/Stairs (Locomotion)    Gait/Stairs Locomotion gait/ambulation independence;stairs negotiation  -     [place] Level (Gait)  standby assist  -     Patient was able to Ambulate yes  -     Distance in Feet (Gait) 160  160x2'  -     Pattern (Gait) step-through  -     Bilateral Gait Deviations forward flexed posture  L limp 2/2 arthritis  -     Negotiation (Stairs) ascending technique;descending technique  -     [place] Level (Stairs) stand by assist;supervision  -     Handrail Location (Stairs) both sides  -     Number of Steps (Stairs) --  grossly 44 steps ascending and descending  -     Comment, (Gait/Stairs) Ambulation mostly performed on even surfaces of amari and concrete outside, some ambulation/step up outside on gravel with SBA.  -       Row Name 09/28/24 0800          Balance    Dynamic Standing Balance contact guard  ambulating over cones with forward step, lateral step, ambulating around cones  -       Row Name 09/28/24 0800          Motor Skills    Therapeutic Exercise hip;knee;ankle  please see flow sheet for detailed TE  -       Row Name 09/28/24 0800          Positioning and Restraints    Pre-Treatment Position sitting in chair/recliner  -     Post Treatment Position bed  -     In Bed supine;call light within reach  -       Row Name 09/28/24 0800          Daily Progress Summary (PT)    Daily Progress Summary (PT) Pt tolerated well, mentions how he is tired from being up since 5:30 this AM. Pt does present as fall risk however is able to demonstrate consistently SBA/supervised ambulation during treatment. Pt also went to bathroom (I). Time spent face to face with pt. No change with POC.  -               User Key  (r) = Recorded By, (t) = Taken By, (c) = Cosigned By      Initials Name Provider Type    Josiane Zepeda PT Physical Therapist                     Physical Therapy Education       Title: PT OT SLP Therapies (Done)       Topic: Physical Therapy (Done)       Point: Mobility training (Done)       Learning Progress Summary             Patient Acceptance, E,D, VU by AG at  9/26/2024 1115    Acceptance, E,D, VU,NR by  at 9/25/2024 1502                         Point: Home exercise program (Done)       Learning Progress Summary             Patient Acceptance, E,D, VU by  at 9/26/2024 1115    Acceptance, E,D, VU,NR by  at 9/25/2024 1502                         Point: Body mechanics (Done)       Learning Progress Summary             Patient Acceptance, E,D, VU by  at 9/26/2024 1115    Acceptance, E,D, VU,NR by  at 9/25/2024 1502                         Point: Precautions (Done)       Learning Progress Summary             Patient Acceptance, E,D, VU by  at 9/26/2024 1115    Acceptance, E,D, VU,NR by  at 9/25/2024 1502                                         User Key       Initials Effective Dates Name Provider Type [facility] 06/16/21 -  Angie Cullen, PT Physical Therapist PT                    PT Recommendation and Plan          Daily Progress Summary (PT)  Daily Progress Summary (PT): Pt tolerated well, mentions how he is tired from being up since 5:30 this AM. Pt does present as fall risk however is able to demonstrate consistently SBA/supervised ambulation during treatment. Pt also went to bathroom (I). Time spent face to face with pt. No change with POC.               Time Calculation:      PT Charges       Row Name 09/28/24 1150             Time Calculation    Start Time 0800  -      Stop Time 0930  -      Time Calculation (min) 90 min  -KH      PT Received On 09/28/24  -                User Key  (r) = Recorded By, (t) = Taken By, (c) = Cosigned By      Initials Name Provider Type     Josiane Nguyễn, PT Physical Therapist                    Therapy Charges for Today       Code Description Service Date Service Provider Modifiers Qty    38526012315 HC PT THER PROC EA 15 MIN 9/27/2024 Josiane Nguyễn, PT GP 1    14731562185 HC PT THER PROC EA 15 MIN 9/27/2024 Josiane Nguyễn, PT GP 1    81021679402 HC PT THERAPEUTIC ACT EA 15 MIN 9/27/2024 Gopi  Josiane, PT GP 2    91828014611 HC PT THERAPEUTIC ACT EA 15 MIN 9/27/2024 Josiane Nguyễn, PT GP 1    25206883649 HC GAIT TRAINING EA 15 MIN 9/27/2024 Josiane Nguyễn, PT GP 1    19779634257 HC PT THER PROC EA 15 MIN 9/28/2024 Josiane Nguyễn, PT GP 1    75776467539 HC PT THER PROC EA 15 MIN 9/28/2024 Josiane Nguyễn, PT GP 2    69148366890 HC PT THERAPEUTIC ACT EA 15 MIN 9/28/2024 Josiane Nguyễn, PT GP 2    85745660434 HC GAIT TRAINING EA 15 MIN 9/28/2024 Josiane Nguyễn, PT GP 1              PT Patti  AM-PAC 6 Clicks Score (PT): 22      Josiane Nguyễn, PT  9/28/2024

## 2024-09-29 LAB
GLUCOSE BLDC GLUCOMTR-MCNC: 104 MG/DL (ref 70–130)
GLUCOSE BLDC GLUCOMTR-MCNC: 132 MG/DL (ref 70–130)

## 2024-09-29 PROCEDURE — 82948 REAGENT STRIP/BLOOD GLUCOSE: CPT

## 2024-09-29 RX ADMIN — APIXABAN 5 MG: 5 TABLET, FILM COATED ORAL at 20:32

## 2024-09-29 RX ADMIN — Medication 1000 MCG: at 10:00

## 2024-09-29 RX ADMIN — METOPROLOL TARTRATE 50 MG: 50 TABLET, FILM COATED ORAL at 10:00

## 2024-09-29 RX ADMIN — APIXABAN 5 MG: 5 TABLET, FILM COATED ORAL at 10:00

## 2024-09-29 RX ADMIN — METOPROLOL TARTRATE 50 MG: 50 TABLET, FILM COATED ORAL at 20:32

## 2024-09-29 RX ADMIN — NICOTINE 1 PATCH: 21 PATCH TRANSDERMAL at 10:11

## 2024-09-29 RX ADMIN — ACETAMINOPHEN 650 MG: 325 TABLET ORAL at 20:32

## 2024-09-29 RX ADMIN — EMPAGLIFLOZIN 10 MG: 10 TABLET, FILM COATED ORAL at 10:00

## 2024-09-29 RX ADMIN — ASPIRIN 81 MG: 81 TABLET, COATED ORAL at 10:00

## 2024-09-29 RX ADMIN — ATORVASTATIN CALCIUM 40 MG: 40 TABLET, FILM COATED ORAL at 10:00

## 2024-09-29 RX ADMIN — SPIRONOLACTONE 12.5 MG: 25 TABLET ORAL at 10:00

## 2024-09-29 NOTE — PROGRESS NOTES
Rehabilitation Nursing  Inpatient Rehabilitation Plan of Care Note    Plan of Care  Copy from [facility]    Bed/Chair/Wheelchair (Active)  Current Status (9/24/2024 12:00:00 AM): Increase physical activity  Weekly Goal: Joint mobility maintained  Discharge Goal: Demonstrates use of adaptive equipment    Safety    Potential for Injury (Active)  Current Status (9/24/2024 12:00:00 AM): Free from injury this shift  Weekly Goal: Prevent falls this week  Discharge Goal: Identifies measures to prevent injury; no falls this  hospitalization    Signed by: Janett Iqbal, Nurse

## 2024-09-29 NOTE — PLAN OF CARE
Problem: Rehabilitation (IRF) Plan of Care  Goal: Plan of Care Review  Outcome: Progressing  Flowsheets (Taken 9/28/2024 2249)  Progress: no change  Plan of Care Reviewed With: patient  Goal: Patient-Specific Goal (Individualized)  Outcome: Progressing  Goal: Absence of New-Onset Illness or Injury  Outcome: Progressing  Intervention: Prevent Fall and Fall Injury  Recent Flowsheet Documentation  Taken 9/28/2024 2221 by Ena Cast, RN  Safety Promotion/Fall Prevention:   safety round/check completed   room organization consistent   nonskid shoes/slippers when out of bed   mobility aid in reach   fall prevention program maintained   elopement precautions   clutter free environment maintained   assistive device/personal items within reach  Taken 9/28/2024 2019 by Ena Cast, RN  Safety Promotion/Fall Prevention:   safety round/check completed   room organization consistent   nonskid shoes/slippers when out of bed   mobility aid in reach   fall prevention program maintained   elopement precautions   clutter free environment maintained   assistive device/personal items within reach  Intervention: Prevent Infection  Recent Flowsheet Documentation  Taken 9/28/2024 2221 by Ena Cast, RN  Infection Prevention:   personal protective equipment utilized   rest/sleep promoted   single patient room provided  Taken 9/28/2024 2019 by Ena Cast, RN  Infection Prevention:   personal protective equipment utilized   rest/sleep promoted   single patient room provided  Goal: Optimal Comfort and Wellbeing  Outcome: Progressing  Goal: Home and Community Transition Plan Established  Outcome: Progressing     Problem: Diabetes Comorbidity  Goal: Blood Glucose Level Within Targeted Range  Outcome: Progressing     Problem: Fall Injury Risk  Goal: Absence of Fall and Fall-Related Injury  Outcome: Progressing  Intervention: Identify and Manage Contributors  Recent Flowsheet  Documentation  Taken 9/28/2024 2221 by Ena Cast, RN  Medication Review/Management: medications reviewed  Taken 9/28/2024 2019 by Ena Cast, RN  Medication Review/Management: medications reviewed  Intervention: Promote Injury-Free Environment  Recent Flowsheet Documentation  Taken 9/28/2024 2221 by Ena Cast, RN  Safety Promotion/Fall Prevention:   safety round/check completed   room organization consistent   nonskid shoes/slippers when out of bed   mobility aid in reach   fall prevention program maintained   elopement precautions   clutter free environment maintained   assistive device/personal items within reach  Taken 9/28/2024 2019 by Ena Cast, RN  Safety Promotion/Fall Prevention:   safety round/check completed   room organization consistent   nonskid shoes/slippers when out of bed   mobility aid in reach   fall prevention program maintained   elopement precautions   clutter free environment maintained   assistive device/personal items within reach     Problem: Skin Injury Risk Increased  Goal: Skin Health and Integrity  Outcome: Progressing     Problem: Adjustment to Stroke (Stroke Rehabilitation)  Goal: Optimal Adjustment to Stroke  Outcome: Progressing     Problem: BADL (Basic Activities of Daily Living) Impairment (Stroke Rehabilitation)  Goal: Optimal Safe BADL Performance  Outcome: Progressing     Problem: Bowel Elimination Management (Stroke Rehabilitation)  Goal: Effective Bowel Elimination/Continence  Outcome: Progressing     Problem: Cognitive Impairment (Stroke Rehabilitation)  Goal: Optimal Cognitive Function  Outcome: Progressing     Problem: Communication Impairment (Stroke Rehabilitation)  Goal: Effective Communication Skills  Outcome: Progressing     Problem: IADL (Instrumental Activities of Daily Living) Impairment (Stroke Rehabilitation)  Goal: Optimal Safe IADL Performance  Outcome: Progressing     Problem: Mobility Impairment  (Stroke Rehabilitation)  Goal: Optimal Mobility [place] and Safety  Outcome: Progressing     Problem: Movement and Motor Control Impairment (Stroke Rehabilitation)  Goal: Optimal Movement and Motor Control  Outcome: Progressing     Problem: Sensory Perceptual Impairment (Stroke Rehabilitation)  Goal: Optimal Sensory Perceptual Status  Outcome: Progressing     Problem: Sexuality and Intimacy (Stroke Rehabilitation)  Goal: Maintains Intimacy/Sexual Expression  Outcome: Progressing     Problem: Spasticity (Stroke Rehabilitation)  Goal: Effective Spasticity Management  Outcome: Progressing     Problem: Swallowing Impairment (Stroke Rehabilitation)  Goal: Optimal Oral Motor and Swallow Ability  Outcome: Progressing     Problem: Urinary Elimination Management (Stroke Rehabilitation)  Goal: Effective Urinary Elimination/Continence  Outcome: Progressing   Goal Outcome Evaluation:  Plan of Care Reviewed With: patient        Progress: no change

## 2024-09-29 NOTE — PLAN OF CARE
Goal Outcome Evaluation:           Progress: improving            Problem: Rehabilitation (IRF) Plan of Care  Goal: Plan of Care Review  Outcome: Progressing  Flowsheets  Taken 9/29/2024 1237 by Melodie Perez RN  Progress: improving  Taken 9/28/2024 2249 by Ena Cast RN  Plan of Care Reviewed With: patient  Goal: Patient-Specific Goal (Individualized)  Outcome: Progressing  Goal: Absence of New-Onset Illness or Injury  Outcome: Progressing  Intervention: Prevent Fall and Fall Injury  Recent Flowsheet Documentation  Taken 9/29/2024 1200 by Melodie Perez RN  Safety Promotion/Fall Prevention:   nonskid shoes/slippers when out of bed   safety round/check completed  Taken 9/29/2024 1000 by Melodie Perez RN  Safety Promotion/Fall Prevention:   nonskid shoes/slippers when out of bed   safety round/check completed  Taken 9/29/2024 0800 by Melodie Perez RN  Safety Promotion/Fall Prevention:   nonskid shoes/slippers when out of bed   safety round/check completed  Intervention: Prevent Infection  Recent Flowsheet Documentation  Taken 9/29/2024 0800 by Melodie Perez RN  Infection Prevention: hand hygiene promoted  Goal: Optimal Comfort and Wellbeing  Outcome: Progressing  Goal: Home and Community Transition Plan Established  Outcome: Progressing     Problem: Diabetes Comorbidity  Goal: Blood Glucose Level Within Targeted Range  Outcome: Progressing     Problem: Fall Injury Risk  Goal: Absence of Fall and Fall-Related Injury  Outcome: Progressing  Intervention: Identify and Manage Contributors  Recent Flowsheet Documentation  Taken 9/29/2024 0800 by Melodie Perez RN  Medication Review/Management: medications reviewed  Intervention: Promote Injury-Free Environment  Recent Flowsheet Documentation  Taken 9/29/2024 1200 by Melodie Perez RN  Safety Promotion/Fall Prevention:   nonskid shoes/slippers when out of bed   safety round/check completed  Taken 9/29/2024 1000 by Melodie Perez RN  Safety  Patient seen and evaluated at bedside    Interval events:  Patient denies chest pain, SOB, or other complaints. Otherwise feels well.          Physical Exam:  T(F): 98.7 (09-15), Max: 98.7 (09-15)  HR: 64 (09-15) (64 - 85)  BP: 132/79 (09-15) (110/69 - 148/77)  RR: 16 (09-15)  SpO2: 98% (09-15)                            10.3   3.54  )-----------( 199      ( 15 Sep 2020 06:30 )             36.5     09-15    142  |  103  |  39<H>  ----------------------------<  86  4.3   |  32<H>  |  1.02    Ca    8.7      15 Sep 2020 06:30  Mg     2.2     09-15               Promotion/Fall Prevention:   nonskid shoes/slippers when out of bed   safety round/check completed  Taken 9/29/2024 0800 by Melodie Perez RN  Safety Promotion/Fall Prevention:   nonskid shoes/slippers when out of bed   safety round/check completed     Problem: Skin Injury Risk Increased  Goal: Skin Health and Integrity  Outcome: Progressing  Intervention: Optimize Skin Protection  Recent Flowsheet Documentation  Taken 9/29/2024 1200 by Melodie Perez RN  Head of Bed (HOB) Positioning: HOB elevated  Taken 9/29/2024 1000 by Melodie Perez RN  Head of Bed (HOB) Positioning: HOB elevated  Taken 9/29/2024 0800 by Melodie Perez RN  Head of Bed (HOB) Positioning: HOB elevated     Problem: Adjustment to Stroke (Stroke Rehabilitation)  Goal: Optimal Adjustment to Stroke  Outcome: Progressing     Problem: BADL (Basic Activities of Daily Living) Impairment (Stroke Rehabilitation)  Goal: Optimal Safe BADL Performance  Outcome: Progressing     Problem: Bowel Elimination Management (Stroke Rehabilitation)  Goal: Effective Bowel Elimination/Continence  Outcome: Progressing     Problem: Cognitive Impairment (Stroke Rehabilitation)  Goal: Optimal Cognitive Function  Outcome: Progressing     Problem: Communication Impairment (Stroke Rehabilitation)  Goal: Effective Communication Skills  Outcome: Progressing     Problem: IADL (Instrumental Activities of Daily Living) Impairment (Stroke Rehabilitation)  Goal: Optimal Safe IADL Performance  Outcome: Progressing     Problem: Mobility Impairment (Stroke Rehabilitation)  Goal: Optimal Mobility [place] and Safety  Outcome: Progressing     Problem: Movement and Motor Control Impairment (Stroke Rehabilitation)  Goal: Optimal Movement and Motor Control  Outcome: Progressing     Problem: Sensory Perceptual Impairment (Stroke Rehabilitation)  Goal: Optimal Sensory Perceptual Status  Outcome: Progressing     Problem: Sexuality and Intimacy (Stroke Rehabilitation)  Goal: Maintains  Intimacy/Sexual Expression  Outcome: Progressing     Problem: Spasticity (Stroke Rehabilitation)  Goal: Effective Spasticity Management  Outcome: Progressing     Problem: Swallowing Impairment (Stroke Rehabilitation)  Goal: Optimal Oral Motor and Swallow Ability  Outcome: Progressing     Problem: Urinary Elimination Management (Stroke Rehabilitation)  Goal: Effective Urinary Elimination/Continence  Outcome: Progressing

## 2024-09-30 VITALS
RESPIRATION RATE: 18 BRPM | BODY MASS INDEX: 29.5 KG/M2 | HEART RATE: 64 BPM | TEMPERATURE: 97.7 F | SYSTOLIC BLOOD PRESSURE: 140 MMHG | DIASTOLIC BLOOD PRESSURE: 82 MMHG | OXYGEN SATURATION: 93 % | WEIGHT: 255 LBS | HEIGHT: 78 IN

## 2024-09-30 LAB
GLUCOSE BLDC GLUCOMTR-MCNC: 129 MG/DL (ref 70–130)
GLUCOSE BLDC GLUCOMTR-MCNC: 133 MG/DL (ref 70–130)
GLUCOSE BLDC GLUCOMTR-MCNC: 149 MG/DL (ref 70–130)

## 2024-09-30 PROCEDURE — 97530 THERAPEUTIC ACTIVITIES: CPT

## 2024-09-30 PROCEDURE — 97116 GAIT TRAINING THERAPY: CPT

## 2024-09-30 PROCEDURE — 97112 NEUROMUSCULAR REEDUCATION: CPT

## 2024-09-30 PROCEDURE — 97535 SELF CARE MNGMENT TRAINING: CPT

## 2024-09-30 PROCEDURE — 97110 THERAPEUTIC EXERCISES: CPT

## 2024-09-30 PROCEDURE — 82948 REAGENT STRIP/BLOOD GLUCOSE: CPT

## 2024-09-30 PROCEDURE — 99231 SBSQ HOSP IP/OBS SF/LOW 25: CPT | Performed by: FAMILY MEDICINE

## 2024-09-30 RX ADMIN — APIXABAN 5 MG: 5 TABLET, FILM COATED ORAL at 21:09

## 2024-09-30 RX ADMIN — ASPIRIN 81 MG: 81 TABLET, COATED ORAL at 08:46

## 2024-09-30 RX ADMIN — ATORVASTATIN CALCIUM 40 MG: 40 TABLET, FILM COATED ORAL at 08:46

## 2024-09-30 RX ADMIN — APIXABAN 5 MG: 5 TABLET, FILM COATED ORAL at 08:45

## 2024-09-30 RX ADMIN — NICOTINE 1 PATCH: 21 PATCH TRANSDERMAL at 08:50

## 2024-09-30 RX ADMIN — EMPAGLIFLOZIN 10 MG: 10 TABLET, FILM COATED ORAL at 08:45

## 2024-09-30 RX ADMIN — METOPROLOL TARTRATE 50 MG: 50 TABLET, FILM COATED ORAL at 08:46

## 2024-09-30 RX ADMIN — ACETAMINOPHEN 650 MG: 325 TABLET ORAL at 21:09

## 2024-09-30 RX ADMIN — Medication 1000 MCG: at 08:46

## 2024-09-30 RX ADMIN — SPIRONOLACTONE 12.5 MG: 25 TABLET ORAL at 08:45

## 2024-09-30 RX ADMIN — METOPROLOL TARTRATE 50 MG: 50 TABLET, FILM COATED ORAL at 21:10

## 2024-09-30 NOTE — THERAPY TREATMENT NOTE
Inpatient Rehabilitation - Occupational Therapy Treatment Note     Thom     Patient Name: Anshul Andrews  : 1955  MRN: [id number]    Today's Date: 2024                 Admit Date: 2024       No diagnosis found.    Patient Active Problem List   Diagnosis    CVA (cerebral vascular accident)       History reviewed. No pertinent past medical history.    History reviewed. No pertinent surgical history.          IRF OT ASSESSMENT FLOWSHEET (Last 12 Hours)       IRF OT Evaluation and Treatment       Row Name 24          OT Time and Intention    Document Type daily treatment  -TM     Mode of Treatment occupational therapy  -TM     Patient Effort good  -TM     Symptoms Noted During/After Treatment none  -TM       Row Name 24 08          General Information    General Observations of Patient Pt agreeable for therapy.  -TM     Existing Precautions/Restrictions fall  -TM       Row Name 24 08          Cognition/Psychosocial    Orientation Status (Cognition) oriented x 4  -TM     Follows Commands (Cognition) WFL  -TM       Row Name 24 08          Strength (Manual Muscle Testing)    Left Hand, Setting 1 (Dynamometer Testing) 66 pounds  -TM     Right Hand, Setting 1 (Dynamometer Testing) 70 pounds  -TM       Row Name 2414          Bathing    Comment (Bathing) supervision/SBA  -TM       Row Name 2414          Upper Body Dressing    [place] Level (Upper Body Dressing) set up assistance  -TM     Position (Upper Body Dressing) edge of bed sitting  -TM       Row Name 24 0814          Lower Body Dressing    [place] Level (Lower Body Dressing) standby assist;supervision  -TM     Position (Lower Body Dressing) edge of bed sitting  -TM       Row Name 24 08          Grooming    [place] Level (Grooming) set up  -TM     Position (Grooming) supported sitting  -TM       Row Name 24 08          Toileting    Comment (Toileting) supervision   -TM       Row Name 09/30/24 0814          Self-Feeding    [place] Level (Self-Feeding) modified independence  -TM     Position (Self-Feeding) supported sitting  -TM       Row Name 09/30/24 0814          Bed-Chair Transfer    Bed-Chair [place] (Transfers) supervision  -     Assistive Device (Bed-Chair Transfers) wheelchair  -TM       Row Name 09/30/24 0814          Motor Skills    Motor Skills coordination;functional endurance;motor control/coordination interventions  -     Coordination other (see comments)  box n blocks: R hand 47 blocks per 1 minute, L hand 39 blocks per 1 minute  -     Motor Control/Coordination Interventions therapeutic exercise/ROM;fine motor manipulation/dexterity activities;gross motor coordination activities;other (see comments)  BUE ther ex/act, GMC/FMC, bilateral coord  -               User Key  (r) = Recorded By, (t) = Taken By, (c) = Cosigned By      Initials Name Effective Dates     Katty Melisacornell Gotti, OT 06/16/21 -                      Occupational Therapy Education       Title: PT OT SLP Therapies (Done)       Topic: Occupational Therapy (Done)       Point: ADL training (Done)       Description:   Instruct learner(s) on proper safety adaptation and remediation techniques during self care or transfers.   Instruct in proper use of assistive devices.                  Learning Progress Summary             Patient Acceptance, E,D, VU,NR by  at 9/30/2024 0829    Acceptance, E, NR by BC at 9/28/2024 1542    Acceptance, E,D, VU,NR by  at 9/27/2024 1111    Acceptance, E,D, VU,NR by  at 9/26/2024 1314    Acceptance, E,D, VU,NR by  at 9/25/2024 1018                         Point: Precautions (Done)       Description:   Instruct learner(s) on prescribed precautions during self-care and functional transfers.                  Learning Progress Summary             Patient Acceptance, E,D, VU,NR by  at 9/30/2024 0829    Acceptance, E, NR by BC at 9/28/2024 1542     Acceptance, E,D, VU,NR by TM at 9/27/2024 1111    Acceptance, E,D, VU,NR by TM at 9/26/2024 1314    Acceptance, E,D, VU,NR by TM at 9/25/2024 1018                                         User Key       Initials Effective Dates Name Provider Type Discipline     06/16/21 -  Melisa Alaniz OT Occupational Therapist OT    BC 06/16/21 -  Siri Beth OT Occupational Therapist OT                        OT Recommendation and Plan    Planned Therapy Interventions (OT): activity tolerance training, adaptive equipment training, BADL retraining, IADL retraining, neuromuscular control/coordination retraining, occupation/activity based interventions, patient/caregiver education/training, ROM/therapeutic exercise, strengthening exercise, transfer/mobility retraining                    Time Calculation:      Time Calculation- OT       Row Name 09/30/24 0829             Time Calculation- OT    OT Start Time 0745  -TM      OT Stop Time 0915  -TM      OT Time Calculation (min) 90 min  -TM      Total Timed Code Minutes- OT 90 minute(s)  -TM      OT Non-Billable Time (min) 15 min  -TM                User Key  (r) = Recorded By, (t) = Taken By, (c) = Cosigned By      Initials Name Provider Type     Melisa Alaniz OT Occupational Therapist                  Therapy Charges for Today       Code Description Service Date Service Provider Modifiers Qty    62537000272 HC OT SELF CARE/MGMT/TRAIN EA 15 MIN 9/30/2024 Melisa Alaniz OT GO 2    23838401774 HC OT THERAPEUTIC ACT EA 15 MIN 9/30/2024 Melisa Alaniz OT GO 1    72025543385 HC OT THER PROC EA 15 MIN 9/30/2024 Melisa Alaniz OT GO 3                     Melisa Alaniz OT  9/30/2024

## 2024-09-30 NOTE — PAYOR COMM NOTE
"Kaylene Morrison, RN   Utilization Review Nurse for Inpatient Rehab   Phone: [phone]  Fax: [phone]  Email: [email or web address]  [facility] NPI: [id number]    CLINICAL REVIEW FOR CONTINUED REHAB STAY APPROVAL  Member:  Anshul Andrews  :  1955  REFERENCE# [id number]  ID# [id number]  Admission Date:  24  Planning for Discharge home on 10/02/24  *Requesting additional 2 days    Anshul Andrews (69 y.o. Male)       Date of Birth   1955    Social Security Number       Address   [address] [place] [address]    Home Phone   [phone]    MRN   [id number]       Taoist   Unknown    Marital Status                               Admission Date   24    Admission Type   Elective    Admitting Provider   Valeriy Brandon MD    Attending Provider   Valeriy Brandon MD    Department, Room/Bed   [facility], 104/2S       Discharge Date       Discharge Disposition       Discharge Destination                                 Attending Provider: Valeriy Brandon MD    Allergies: No Known Allergies    Isolation: None   Infection: None   Code Status: CPR    Ht: 198.1 cm (78\")   Wt: 116 kg (255 lb)    Admission Cmt: None   Principal Problem: CVA (cerebral vascular accident) [I63.9]                   [facility] Encounter Date/Time: 2024 [facility]   Hospital Account: [id number]    MRN: [id number]   Patient:  Anshul Andrews   Contact Serial #: [id number]   SSN:          ENCOUNTER             Patient Class: Rehab IP   Unit: [facility] Service: Physical Medicine and Rehabilitation     Bed: 104/2S   Admitting Provider: Valeriy Brandon MD   Referring Physician: Provider, No Known   Attending Provider: Valeriy Brandon MD   Adm Diagnosis: CVA (cerebral vascular a*      PATIENT                 Name: Anshul Andrews : 1955 (69 yrs)   Address: [address] [place] [address] Sex: Male   City: [place] [address]     Marital Status:          " Ethnicity: NOT                Race: WHITE   Primary Care Provider: Provider, No Known         Primary Phone: [phone]   EMERGENCY CONTACT   Contact Name Legal Guardian? Relationship to Patient Home Phone Work Phone   1. Yogesh Andrews  2. *No Contact Specified*      Son                      GUARANTOR            Guarantor: Anshul Andrews     : 1955   Address: [address] [place] [address] Sex: [place] [address]     Relation to Patient: Self       Home Phone: [phone]   Guarantor ID: [id number]       Work Phone:     GUARANTOR EMPLOYER   Employer:           Status: UNKNOWN      COVERAGE          PRIMARY INSURANCE   Payor: [facility] MEDICARE REPLACEMENT Plan: [facility] MCARE REPLACEMENT   Group Number: [id number] Insurance Type: INDEMNITY   Subscriber Name: ANSHUL ANDREWS Subscriber : 1955   Subscriber ID: [id number] Coverage Address: [facility] [place] [address]   Pre-Certification #: Pre-cert number: N/A Authorization #:     Pat. Rel. to Subscriber: Self Coverage Phone: [phone]       Heike Yoon MSW     Case Management  Significant Note      Signed  Date of Service:  24  Creation Time:  24     Signed             24   Plan   Plan Attempted to contact cathi Zuñiga [phone] without success; voicemail is not set-up.  Pt requested assisted living and housing information that is not low-income for [place].  Provided information for [facility] assisted living in [place] for future needs.  Provided some information about where to look for apartments and number for landlord at [facility].  Pt will go to son's home at discharge and he will plan to get his own home or apartment in the future.                       Heike Yoon MSW     Case Management  Significant Note      Signed  Date of Service:  24  Creation Time:  24     Signed              09/26/24 1430   Plan   Plan Spoke to pt about plans for discharge on 10-2-24 and how he is doing in therapy.  Discussed possible need for outpatient therapy and explained PT/OT will make recommendations closer to discharge date.  Pt will go home with son and his significant other Emily and her two children when he is discharged.   Patient/Family in Agreement with Plan yes                       Heike Yoon MSW     Case Management  Significant Note      Signed  Date of Service:  09/26/24 1536  Creation Time:  09/26/24 1536     Signed             09/26/24 0925   Plan   Plan Team conference held today.  Pt is recommended for discharge on 10-2-24.  Attempted to contact reinaldo Zuñiga [phone] without success; voicemail is not set-up.                      Heike Yoon MSW     Case Management     Significant Note      Signed     Date of Service: 09/25/24 1456  Creation Time: 09/25/24 1456     Signed              09/25/24 1448   Living Environment   People in Home alone   Current Living Arrangements home   Potentially Unsafe Housing Conditions none   In the past 12 months has the electric, gas, oil, or water company threatened to shut off services in your home? No   Primary Care Provided by self   Provides Primary Care For no one   Family Caregiver if Needed child(karen), adult   Family Caregiver Names Renialdo Andrews   Quality of Family Relationships helpful;involved;supportive   Able to Return to Prior Arrangements no   Living Arrangement Comments Pt is a 68 Y/O male admitted to physical rehab on 9-24-24 with dx of CVA from [facility].  Spoke to pt who states being  and living alone in [place], [place].  Pt worked full-time and traveled a lot for his job.  Pt has one son Yogesh Andrews who lives in [place], [place].  Pt receives income from employment and Social Security shelter.  Pt asked about being eligible for Social Security  disability.  Informed pt he would need to contact Social Security Administration or go to an office to ask this question.  Informed pt there is a Social Security office in [place], [place] and SS can provide a phone number for this office if needed.  Pt says he has Medicare A&B and a supplement insurance.  Pt does not have prescription insurance.  Pt did not have a PCP prior to admission.  Pt plans to establish care with a PCP in [place] and [facility]  Pt does not have POA or living will.  [facility].  Pt was independent with ADL's, mobility, driving and working prior to admission.   Resource/Environmental Concerns   Resource/Environmental Concerns none   Transportation Concerns none   Transition Planning   Patient/Family Anticipates Transition to home with family   Patient/Family Anticipated Services at Transition    (To be recommended closer to discharge date.)   Transportation Anticipated family or friend will provide   Discharge Needs Assessment (IRF)   Current Outpatient/Agency/Support Group    (Pt did not receive home health or outpatient therapy prior to admission.)   Equipment Currently Used at Home none   Discharge Coordination/Progress Pt plans to go to son Yogesh Andrews's home at discharge.  Pt says he will be selling his home in [place] and he will be living in [place], [place].  Team conference will be held on 24.  SS will follow and assist with discharge planning.                    Adams Hillman MD   Physician  Hospitalist     Progress Notes      Signed     Date of Service: 24  Creation Time: 24     Signed       [facility]  PROGRESS NOTE     Patient Identification:  Name:  Anshul Andrews  Age:  69 y.o.  Sex:  male  :  1955  MRN:  [id number]  Visit Number:  [id number]  ROOM: 104/      Primary Care Provider:  Provider, No Known     Length of stay in inpatient status:   4     Subjective      Chief Compliant: Status post right MCA distribution CVA     History of Presenting Illness: 69-year-old gentleman who is status post CVA.  Patient states he is doing very well.  Therapist states that his balance is still not great but is markedly improved.  Patient has no new complaints otherwise with the exception of having difficulty resting at night.     Objective      Current Hospital Meds:  Scheduled Medication   apixaban, 5 mg, Oral, Q12H  aspirin, 81 mg, Oral, Daily  atorvastatin, 40 mg, Oral, Daily  empagliflozin, 10 mg, Oral, Daily  metoprolol tartrate, 50 mg, Oral, Q12H  nicotine, 1 patch, Transdermal, Q24H  spironolactone, 12.5 mg, Oral, Daily  vitamin B-12, 1,000 mcg, Oral, Daily        Infusion Medications         ----------------------------------------------------------------------------------------------------------------------  Vital Signs:  Temp:  [95.5 °F (35.3 °C)-97.9 °F (36.6 °C)] 97.9 °F (36.6 °C)  Heart Rate:  [68-77] 77  Resp:  [18-20] 20  BP: (121-132)/(68-73) 132/68  SpO2:  [96 %] 96 %  on   ;   Device (Oxygen Therapy): room air  Body mass index is 29.47 kg/m².         Wt Readings from Last 3 Encounters:   09/24/24 116 kg (255 lb)      Intake & Output (last 3 days)           09/25 0701 09/26 0700 09/26 0701 09/27 0700 09/27 0701 09/28 0700 09/28 0701 09/29 0700     P.O. 720 1440 2040       Total Intake(mL/kg) 720 (6.2) 1440 (12.4) 2040 (17.6)       Net +720 +1440 +2040                     Urine Unmeasured Occurrence 2 x 7 x 6 x               Diet: Diabetic, Regular/House; Consistent Carbohydrate; Fluid Consistency: Thin (IDDSI 0)  ----------------------------------------------------------------------------------------------------------------------  Physical exam:  Constitutional: No acute distress  HEENT: Normocephalic atraumatic  Neck:   Supple  Cardiovascular: Regular rate and rhythm  Pulmonary/Chest: Clear to auscultation  Abdominal: Positive bowel sounds soft.  "  Musculoskeletal: Patient appears to have arthritic changes in the left knee  Neurological: 3-4 out of 5 strength on the left  Skin: No rash  Peripheral vascular: No edema  Genitourinary:  ----------------------------------------------------------------------------------------------------------------------     Last echocardiogram:     ----------------------------------------------------------------------------------------------------------------------       Results from last 7 days   Lab Units 09/25/24  0010   WBC 10*3/mm3 13.42*   HEMOGLOBIN g/dL 17.0   HEMATOCRIT % 54.1*   MCV fL 85.5   MCHC g/dL 31.4*   PLATELETS 10*3/mm3 291               Results from last 7 days   Lab Units 09/25/24  0010   SODIUM mmol/L 140   POTASSIUM mmol/L 4.6   CHLORIDE mmol/L 106   CO2 mmol/L 23.5   BUN mg/dL 32*   CREATININE mg/dL 1.17   CALCIUM mg/dL 9.2   GLUCOSE mg/dL 91   ALBUMIN g/dL 3.8   BILIRUBIN mg/dL 0.6   ALK PHOS U/L 128*   AST (SGOT) U/L 34   ALT (SGPT) U/L 44*   Estimated Creatinine Clearance: 86.3 mL/min (by C-G formula based on SCr of 1.17 mg/dL).  No results found for: \"AMMONIA\"                    Glucose   Date/Time Value Ref Range Status   09/28/2024 0619 115 70 - 130 mg/dL Final   09/27/2024 1541 91 70 - 130 mg/dL Final   09/27/2024 0612 93 70 - 130 mg/dL Final   09/26/2024 1549 120 70 - 130 mg/dL Final   09/26/2024 0633 96 70 - 130 mg/dL Final   09/25/2024 2014 96 70 - 130 mg/dL Final   09/25/2024 1603 90 70 - 130 mg/dL Final   09/25/2024 1051 105 70 - 130 mg/dL Final      No results found for: \"TSH\", \"FREET4\"  No results found for: \"PREGTESTUR\", \"PREGSERUM\", \"HCG\", \"HCGQUANT\"  Pain Management Panel                 No data to display                   Brief Urine Lab Results         None             No results found for: \"BLOODCX\"      No results found for: \"URINECX\"  No results found for: \"WOUNDCX\"  No results found for: \"STOOLCX\"         I have personally looked at the labs and they are summarized " above.  ----------------------------------------------------------------------------------------------------------------------  Detailed radiology reports for the last 24 hours:     Imaging Results (Last 24 Hours)         ** No results found for the last 24 hours. **             Final impressions for the last 30 days of radiology reports:     No radiology results for the last 30 days.  I have personally looked at the radiology images and read the final radiology report.     Assessment & Plan    Status post right MCA distribution CVA--currently on Eliquis.  Patient independent with bed mobility; standby assist to contact-guard for transfers.  Able to ambulate 300+ feet with standby assistance to contact-guard navigated 25 steps with standby assist to contact-guard.  Is working to develop better balance with his therapy.  Requiring standby assist contact-guard for lower body dressing; set up for grooming; supervision standby assist for transfers     Atrial fibrillation currently appears to be in a sinus rhythm.  Is on Toprol and Eliquis.     Diabetes mellitus controlled continue Jardiance     Hypertension reasonably well-controlled     CHF with reduced EF compensated.  Patient is on beta-blocker therapy, Aldactone and Jardiance.  May benefit from further cardiac workup as an outpatient     Tobacco abuse recommend cessation     Cardiac thrombi continue Eliquis     Polycythemia last hemoglobin 17.     VTE Prophylaxis:   Emilyquis           Adams Hillman MD  [facility] Hospitalist  24  09:44 EDT                  Josiane Nguyễn, PT   Physical Therapist  Physical Therapy     Therapy Treatment Note      Signed     Date of Service: 24 115  Creation Time: 24     Signed       Expand All Collapse All    Inpatient Rehabilitation - Physical Therapy Treatment Note                                                              RAMIRO Tomas     Patient Name: Anshul Andrews                       :  1955                        MRN: [id number]     Today's Date: 9/28/2024                                                                                            Admit Date: 9/24/2024                          Visit Dx:   Visit Diagnosis   No diagnosis found.        Problem List       Patient Active Problem List   Diagnosis    CVA (cerebral vascular accident)            Medical History   History reviewed. No pertinent past medical history.        Surgical History   History reviewed. No pertinent surgical history.        PT ASSESSMENT (Last 12 Hours)            IRF PT Evaluation and Treatment         Row Name 09/28/24 0800                 PT Time and Intention     Document Type daily treatment  -       Mode of Treatment physical therapy  -       Patient/Family/Caregiver Comments/Observations Pt agreeable to PT treatment after OT session  -       Total Minutes, Physical Therapy 90  rest breaks, education, physician evaluation/assessment, transfers/transitions.  -          Row Name 09/28/24 0800                 Bed Mobility     Bed Mobility supine-sit;sit-supine  -       Supine-Sit [place] (Bed Mobility) independent  -       Sit-Supine [place] (Bed Mobility) independent  -          Row Name 09/28/24 0800                 Transfer Assessment/Treatment     Transfers sit-stand transfer;stand-sit transfer;chair-bed transfer  -          Row Name 09/28/24 0800                 Chair-Bed Transfer     Chair-Bed [place] (Transfers) supervision  -          Row Name 09/28/24 0800                 Sit-Stand Transfer     Sit-Stand [place] (Transfers) supervision;standby assist  -       Assistive Device (Sit-Stand Transfers) wheelchair;other (see comments)  // bars  -          Row Name 09/28/24 0800                 Stand-Sit Transfer     Stand-Sit [place] (Transfers) supervision;standby assist  -       Assistive Device (Stand-Sit Transfers) wheelchair;other (see comments)  // lm   -          Row Name 09/28/24 0800                 Gait/Stairs (Locomotion)     Gait/Stairs Locomotion gait/ambulation independence;stairs negotiation  -       [place] Level (Gait) standby assist  -       Patient was able to Ambulate yes  -       Distance in Feet (Gait) 160  160x2'  -       Pattern (Gait) step-through  -       Bilateral Gait Deviations forward flexed posture  L limp 2/2 arthritis  -       Negotiation (Stairs) ascending technique;descending technique  -       [place] Level (Stairs) stand by assist;supervision  -       Handrail Location (Stairs) both sides  -       Number of Steps (Stairs) --  grossly 44 steps ascending and descending  -       Comment, (Gait/Stairs) Ambulation mostly performed on even surfaces of amari and concrete outside, some ambulation/step up outside on gravel with SBA.  -          Row Name 09/28/24 0800                 Balance     Dynamic Standing Balance contact guard  ambulating over cones with forward step, lateral step, ambulating around cones  -          Row Name 09/28/24 0800                 Motor Skills     Therapeutic Exercise hip;knee;ankle  please see flow sheet for detailed TE  -          Row Name 09/28/24 0800                 Positioning and Restraints     Pre-Treatment Position sitting in chair/recliner  -       Post Treatment Position bed  -       In Bed supine;call light within reach  -          Row Name 09/28/24 0800                 Daily Progress Summary (PT)     Daily Progress Summary (PT) Pt tolerated well, mentions how he is tired from being up since 5:30 this AM. Pt does present as fall risk however is able to demonstrate consistently SBA/supervised ambulation during treatment. Pt also went to bathroom (I). Time spent face to face with pt. No change with POC.  -                    User Key  (r) = Recorded By, (t) = Taken By, (c) = Cosigned By        Initials Name Provider Type     Josiane Zepeda, PT  Physical Therapist                                  PT Recommendation and Plan     Daily Progress Summary (PT)  Daily Progress Summary (PT): Pt tolerated well, mentions how he is tired from being up since 5:30 this AM. Pt does present as fall risk however is able to demonstrate consistently SBA/supervised ambulation during treatment. Pt also went to bathroom (I). Time spent face to face with pt. No change with POC.              Time Calculation:        PT Charges         Row Name 24 1150                       Time Calculation     Start Time 0800  -KH         Stop Time 930  -KH         Time Calculation (min) 90 min  -KH         PT Received On 24  -Josiane Zepeda, PT   Physical Therapist  Physical Therapy     Therapy Treatment Note      Signed     Date of Service: 24 141  Creation Time: 24     Signed       Expand All Collapse All    Inpatient Rehabilitation - Physical Therapy Treatment Note                                                              RAMIRO Union     Patient Name: Anshul Andrews                       : 1955                        MRN: [id number]     Today's Date: 2024                                                                                            Admit Date: 2024                          Visit Dx:   Visit Diagnosis   No diagnosis found.        Problem List       Patient Active Problem List   Diagnosis    CVA (cerebral vascular accident)            Medical History   History reviewed. No pertinent past medical history.        Surgical History   History reviewed. No pertinent surgical history.        PT ASSESSMENT (Last 12 Hours)            IRF PT Evaluation and Treatment         Row Name 24 1016                 PT Time and Intention     Document Type daily treatment  -KH       Mode of Treatment physical therapy  -KH       Total Minutes, Physical Therapy 90  -KH          Row Name 24 1016                 General  Information     General Observations of Patient Pt agreeable with treatment this date, pleasant and cooperative with therapy.  -KH       Existing Precautions/Restrictions fall  -          Row Name 09/27/24 1016                 Bed Mobility     Bed Mobility supine-sit  -       Supine-Sit [place] (Bed Mobility) independent  -[facility] Name 09/27/24 1016                 Transfer Assessment/Treatment     Transfers sit-stand transfer;stand-sit transfer  -[facility] Name 09/27/24 1016                 Bed-Chair Transfer     Bed-Chair [place] (Transfers) standby assist;contact guard  -[facility] Name 09/27/24 1016                 Sit-Stand Transfer     Sit-Stand [place] (Transfers) supervision;contact guard  -       Assistive Device (Sit-Stand Transfers) --  // [facility] Name 09/27/24 1016                 Stand-Sit Transfer     Stand-Sit [place] (Transfers) supervision;contact guard  -       Assistive Device (Stand-Sit Transfers) other (see comments)  // [facility] Name 09/27/24 1016                 Gait/Stairs (Locomotion)     Gait/Stairs Locomotion gait/ambulation independence;stairs negotiation  -       [place] Level (Gait) contact guard;standby assist  -       Patient was able to Ambulate yes  -KH       Distance in Feet (Gait) 300  300+  -KH       Negotiation (Stairs) ascending technique;descending technique  -       [place] Level (Stairs) contact guard;stand by assist  -       Handrail Location (Stairs) both sides  -KH       Number of Steps (Stairs) 25  -KH       Ascending Technique (Stairs) step-over-step  -       Descending Technique (Stairs) step-over-step  -[facility] Name 09/27/24 1016                 Balance     Balance Interventions standing;dynamic;static;foam;balance board;single limb stance  bean bag toss on blue foam  -[facility] Name 09/27/24 1016                 Motor Skills     Therapeutic Exercise hip;ankle   please see flow sheet for detailed TE with standing, supine, and sitting therex with LE's for strengthening/engagement to assist with increased balance/coordination/motor planning  -          Row Name 24 1016                 Positioning and Restraints     Pre-Treatment Position in bed  -       Post Treatment Position bed  -       In Bed supine;call light within reach  -          Row Name 24 1016                 Daily Progress Summary (PT)     Daily Progress Summary (PT) Pt demonstrates minimal deficits, tolerated therapy well with 5/10 rating of fatigue.  -                    User Key  (r) = Recorded By, (t) = Taken By, (c) = Cosigned By        Initials Name Provider Type     Josiane Zepeda, PT Physical Therapist                                  PT Recommendation and Plan     Daily Progress Summary (PT)  Daily Progress Summary (PT): Pt demonstrates minimal deficits, tolerated therapy well with 5/10 rating of fatigue.              Time Calculation:        PT Charges         Row Name 24 1409 24 1407                  Time Calculation     Start Time 1303  -KH 1016  -KH       Stop Time 1318  PM session  -KH 1131  AM session  -       Time Calculation (min) 15 min  -KH 75 min  -       PT Received On 24  -KH 24  -                       Siri Beth OT   Occupational Therapist  Occupational Therapy     Therapy Treatment Note      Signed     Date of Service: 24  Creation Time: 24     Signed       Expand All Collapse All    Inpatient Rehabilitation - Occupational Therapy Treatment Note     RAMIRO Tomas     Patient Name: Anshul Andrews                       : 1955                        MRN: [id number]     Today's Date: 2024                                                                             Admit Date: 2024        Visit Diagnosis   No diagnosis found.        Problem List       Patient Active Problem List   Diagnosis    CVA  (cerebral vascular accident)            Medical History   History reviewed. No pertinent past medical history.        Surgical History   History reviewed. No pertinent surgical history.                       IRF OT ASSESSMENT FLOWSHEET (Last 12 Hours)            IRF OT Evaluation and Treatment         Row Name 09/28/24 1529 09/28/24 0700            OT Time and Intention     Document Type daily treatment  -BC daily treatment  -BC     Mode of Treatment occupational therapy  -BC occupational therapy  -BC     Patient Effort good  -BC good  -BC     Symptoms Noted During/After Treatment fatigue  -BC none  -BC        Row Name 09/28/24 1529 09/28/24 0700            General Information     Patient Profile Reviewed yes  -BC yes  -BC     Patient/Family/Caregiver Comments/Observations Pt. agreeable to OT this AM w/complaints of the early morning time.  -BC Pt. alert and agreeable to tx. this AM.  -BC     General Observations of Patient Pt. awake and up on EOB, agreeable to participate.  Pt. completed various ADL tasks, including self feeding with set up A only.  -BC --     Existing Precautions/Restrictions fall  -BC --     Comment, General Information Pt. sessions completed AM/PM with fatigue expressed by pt. after completing PT sessions.  Pt. required re scheduling of PM session due to fatigue.  Pt. napped and then was willing to cont. tx.  Pt. completed multiple therEx. including the hand bike (3 min. x 3) Pulley to address mild deficits noted in R shoulder due to old injury per pt. report, resistive clips addressing AROM, pinch strength.  Pt. request return to room for ball game.  -BC --        Row Name 09/28/24 1529                 Positioning and Restraints     Pre-Treatment Position other (comment)  -BC       Post Treatment Position wheelchair  -BC       In Wheelchair encouraged to call for assist  -BC                    User Key  (r) = Recorded By, (t) = Taken By, (c) = Cosigned By        Initials Name Effective Dates      BC Kirit, Siri, OT 21 -                                   Time Calculation:        Time Calculation- OT         Row Name 24 1543 24 1542                  Time Calculation- OT     OT Start Time 1320  -BC 0700  -BC       OT Stop Time 1420  -BC 0745  -BC       OT Time Calculation (min) 60 min  -BC 45 min  -BC       OT Non-Billable Time (min) -- 15 min  -BC                        Melisa Alaniz, RAUL   Occupational Therapist  Specialty: Occupational Therapy     Therapy Treatment Note      Signed     Date of Service: 24 1112  Creation Time: 24 111     Signed       Expand All Collapse All    Inpatient Rehabilitation - Occupational Therapy Treatment Note      Thom     Patient Name: Anshul Andrews                       : 1955                        MRN: [id number]     Today's Date: 2024                                                                             Admit Date: 2024        Visit Diagnosis   No diagnosis found.        Problem List       Patient Active Problem List   Diagnosis    CVA (cerebral vascular accident)            Medical History   History reviewed. No pertinent past medical history.        Surgical History   History reviewed. No pertinent surgical history.                       IRF OT ASSESSMENT FLOWSHEET (Last 12 Hours)            IRF OT Evaluation and Treatment         Row Name 24 1109                 OT Time and Intention     Document Type daily treatment  -TM       Mode of Treatment occupational therapy  -TM       Patient Effort good  -TM       Symptoms Noted During/After Treatment none  -TM          Row Name 24 1109                 General Information     General Observations of Patient Pt agreeable for therapy.  -TM       Existing Precautions/Restrictions fall  -TM          Row Name 24 1109                 Cognition/Psychosocial     Orientation Status (Cognition) oriented x 4  -TM       Follows Commands (Cognition) WFL   -TM          Row Name 09/27/24 1109                 Lower Body Dressing     Lucas Level (Lower Body Dressing) standby assist;contact guard assist;verbal cues  -TM       Position (Lower Body Dressing) edge of bed sitting  -TM          Row Name 09/27/24 1109                 Grooming     Lucas Level (Grooming) set up  -TM       Position (Grooming) supported sitting  -TM          Row Name 09/27/24 1109                 Bed-Chair Transfer     Bed-Chair Lucas (Transfers) supervision;standby assist;verbal cues  -TM       Assistive Device (Bed-Chair Transfers) wheelchair  -TM          Row Name 09/27/24 1109                 Chair-Bed Transfer     Chair-Bed Lucas (Transfers) supervision;standby assist;verbal cues  -TM       Assistive Device (Chair-Bed Transfers) wheelchair  -TM          Row Name 09/27/24 1109                 Motor Skills     Motor Skills coordination;functional endurance;motor control/coordination interventions  -       Motor Control/Coordination Interventions therapeutic exercise/ROM;fine motor manipulation/dexterity activities;gross motor coordination activities;other (see comments)  BUE ther ex/act, GMC/FMC, bilateral coord  -TM                    User Key  (r) = Recorded By, (t) = Taken By, (c) = Cosigned By        Initials Name Effective Dates     TM Melisa Alaniz, OT 06/16/21 -                              OT Recommendation and Plan     Planned Therapy Interventions (OT): activity tolerance training, adaptive equipment training, BADL retraining, IADL retraining, neuromuscular control/coordination retraining, occupation/activity based interventions, patient/caregiver education/training, ROM/therapeutic exercise, strengthening exercise, transfer/mobility retraining              Time Calculation:        Time Calculation- OT         Row Name 09/27/24 1111                       Time Calculation- OT     OT Start Time 0745  -TM         OT Stop Time 0915  -TM         OT Time  Calculation (min) 90 min  -TM         Total Timed Code Minutes- OT 90 minute(s)  -TM         OT Non-Billable Time (min) 15 min  -TM                           Carey Coleman MS CCC-SLP   Speech and Language Pathologist  Specialty:  Speech Pathology  Therapy Evaluation      Signed  Date of Service:  24 1135  Creation Time:  24     Signed        Expand All Collapse All  Inpatient Rehabilitation - Speech Language Pathology Initial Evaluation  [facility]  SPEECH - LANGUAGE - COGNITIVE EVALUATION     Patient Name: Anshul Andrews                       : 1955                        MRN: [id number]  Today's Date: 2024     Admit Date: 2024  Anshul Andrews  was seen this am in pt room of [facility]'s IPR unit to participate in s/l and cognitive assessment for safety/function in adls. He was positioned upright in bed to cooperatively participate. All results and observations of this evaluation are felt to be representative of patient's current functional status. He has a PMH significant for HTN, DM, and tobacco abuse.      Mr Andrews was at work when he experienced sudden onset of left-sided weakness w/ facial droop and loss of speech. He was taken to OSH immediately and received TNK and subsequent thrombectomy. 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 He was reportedly cleared for po diet w/ thin liquids by SLP department of OSH.         Social History   Social History            Socioeconomic History    Marital status:    Tobacco Use    Smoking status: Every Day       Current packs/day: 2.00       Types: Cigarettes    Smokeless tobacco: Never   Vaping Use    Vaping status: Never Used   Substance and Sexual Activity    Alcohol use: Never    Drug use: Never    Sexual activity: Defer            Diet Orders (active) (From admission, onward)          Start     Ordered     24 1536   Diet: Diabetic, Regular/House; Consistent Carbohydrate; Fluid Consistency: Thin (IDDSI 0)  Diet  "Effective Now         09/24/24 5040                       He is observed on room air w/o complications across this evaluation.      Receptive language skills were intact. Mr Andrews was able to id common objects and personal body parts, follow simple and multi-step directives, understand complex \"wh\" questions and participate in conversational exchange w/o difficulties.     Expressive language skills were intact. He was able to complete automatic speech tasks, confrontation naming tasks, complete complex oe sentences, respond to complet oe \"wh\" questions, repeat at word/sentence and multiple digit levels, as well as participate in complex conversational exchange. No aphasia, anomia or verbal apraxia evidenced.     He was independently oriented to person, place, and time. Immediate, STM, and LTM were WFL w/ patient recalling recent adls and providing personal information w/o delays. Thought organization, processing, and problem solving were WFL w/patient demonstrating appropriate comparing/contrasting, understanding of idiomatic language, convergent and divergent thinking skills.     Facial/oral structures were noted w/ a trace to mild left facial weakness in the region immediately below left eye only upon observation. Oral mucosa were moist, pink and clean. Secretions were clear, thin, and controlled. OROM/DORY was WFL w/o lingual deviation upon protrusion. Sensation appears intact and is reported as intact. Speech intensity, clarity, and intelligibility were WFL w/o dysarthria or oral apraxia noted. Facial weakness has no impact on speech intelligibility.      Graphic and reading skills were intact, premorbid baseline status. He was able to id isolated letters, simple, and moderate words, as well as sentences and small paragraphs. Signature was legible.     Pragmatic skills were WFL w/ appropriate eye gaze patterns and visual tracking. Language was appropriate in context w/ topic initiation and maintenance " independently. No neglect evidenced. Humor response was intact w/ appropriate affect.     Visit Dx:  Visit Diagnosis   No diagnosis found.     Problem List       Patient Active Problem List   Diagnosis    CVA (cerebral vascular accident)         Medical History   History reviewed. No pertinent past medical history.     Surgical History   History reviewed. No pertinent surgical history.        Impression:       Mr Andrews presents with speech, language, and cognitive skills representative of his premorbid baseline function.      SLP Recommendation and Plan  No further formal SLP f/u recommended for s/l and cognitive skills. Patient will participate in further clinical dysphagia assessment. Please see full dysphagia note for details.     D/w patient results and recommendations w/ verbal understanding and agreement.     D/w RN results and recommendations w/ verbal understanding and agreement.      Thank you for allowing me to participate in the care of your patient-  Carey Coleman M.S., CCC-SLP  SLP EVALUATION (Last 72 Hours)            SLP SLC Evaluation         Row Name 09/25/24 1015                                   Communication Assessment/Intervention     Document Type evaluation  -JR                       Comprehension Assessment/Intervention     Comprehension Assessment/Intervention Auditory Comprehension;Reading Comprehension  -JR                       Auditory Comprehension Assessment/Intervention     Auditory Comprehension (Communication) WFL  -JR             Able to Identify Objects/Pictures (Communication) WFL  -JR             Answers Questions (Communication) WFL  -JR             Able to Follow Commands (Communication) WFL  -JR             Narrative Discourse WFL  -JR                       Reading Comprehension Assessment/Intervention     Reading Comprehension (Communication) WFL  -JR             Scanning (Reading) WFL  -JR             Visual Matching Ability (Reading) WFL  -JR             Single Word Level  WFL  -JR             Phrase Level WFL  -JR             Functional Reading Tasks WFL  -JR                       Expression Assessment/Intervention     Expression Assessment/Intervention verbal expression;graphic expression  -JR                       Verbal Expression Assessment/Intervention     Verbal Expression WFL  -JR             Automatic Speech (Communication) WFL  -JR             Repetition WFL  -JR             Phrase Completion WFL  -JR             Responsive Naming WFL  -JR             Confrontational Naming WFL  -JR             Spontaneous/Functional Words WFL  -JR             Sentence Formulation WFL  -JR             Conversational Discourse/Fluency WFL  -JR                       Graphic Expression Assessment/Intervention     Graphic Expression WFL  -JR             Graphic Expression to Dictation WFL  -JR             Biographical Information WFL  -JR             Functional Correspondence WFL  -JR                       Oral Motor Structure and Function     Oral Motor Structure and Function WFL  -JR             Mucosal Quality moist, healthy  -JR                       Oral Musculature and Cranial Nerve Assessment     Oral Motor General Assessment WFL  -JR                       Motor Speech Assessment/Intervention     Motor Speech Function WFL  -JR             Initiation of Phonation (Communication) WFL  -JR             Automatic Speech (Communication) WFL  -JR             Verbal Repetition (Communication) WFL  -JR             Conversational Speech (Communication) WFL  -JR             Speech intelligibility 100%;with unfamiliar listener  -                          User Key  (r) = Recorded By, (t) = Taken By, (c) = Cosigned By        Initials Name Effective Dates     Carey Small MS CCC-SLP 10/25/21 -                           EDUCATION  The patient has been educated in the following areas:      Cognitive Impairment Communication Impairment.        Time Calculation:        Time Calculation- SLP         Row  Name 24                       Time Calculation- SLP     SLP Start Time 1000  -JR         SLP Stop Time 1030  -JR         SLP Time Calculation (min) 30 min  -         SLP Received On 24  -Carey Small MS CCC-SLP   Speech and Language Pathologist  Specialty:  Speech Pathology  Therapy Evaluation      Signed  Date of Service:  24  Creation Time:  24     Signed        Expand All Collapse All  Inpatient Rehabilitation - Speech Language Pathology   Swallow Initial Evaluation [facility]  CLINICAL DYSPHAGIA ASSESSMENT     Patient Name: Anshul Andrews                       : 1955                        MRN: [id number]  Today's Date: 2024     Admit Date: 2024  Anshul Andrews  was seen at bedside this am on [facility]'s IPR unit to assess safety/efficacy of swallowing fnx, determine safest/least restrictive diet tolerance. He has a PMH significant for HTN, DM, and tobacco abuse.      Mr Andrews was at work when he experienced sudden onset of left-sided weakness w/ facial droop and loss of speech. He was taken to OSH immediately and received TNK and subsequent thrombectomy. 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 He was reportedly cleared for po diet w/ thin liquids by SLP department of OSH.      He is currently tolerating a least restrictive po diet of regular textures and thin liquids. He reports no difficulty w/ any po intake since initial assessment by SLP at OSH.      Social History   Social History            Socioeconomic History    Marital status:    Tobacco Use    Smoking status: Every Day       Current packs/day: 2.00       Types: Cigarettes    Smokeless tobacco: Never   Vaping Use    Vaping status: Never Used   Substance and Sexual Activity    Alcohol use: Never    Drug use: Never    Sexual activity: Defer         Imaging:  No imaging is available for review.      Labs:    Latest Reference Range & Units  09/25/24 00:10   WBC 3.40 - 10.80 10*3/mm3 13.42 (H)   RBC 4.14 - 5.80 10*6/mm3 6.33 (H)   Hemoglobin 13.0 - 17.7 g/dL 17.0   Hematocrit 37.5 - 51.0 % 54.1 (H)   Platelets 140 - 450 10*3/mm3 291   RDW 12.3 - 15.4 % 15.9 (H)   MCV 79.0 - 97.0 fL 85.5   MCH 26.6 - 33.0 pg 26.9   MCHC 31.5 - 35.7 g/dL 31.4 (L)   MPV 6.0 - 12.0 fL 10.5   RDW-SD 37.0 - 54.0 fl 47.5   (H): Data is abnormally high  (L): Data is abnormally low     Diet Orders (active) (From admission, onward)          Start     Ordered     09/24/24 1536   Diet: Diabetic, Regular/House; Consistent Carbohydrate; Fluid Consistency: Thin (IDDSI 0)  Diet Effective Now         09/24/24 1535                       He is observed on room air w/o complications across this assessment.      He was positioned upright and centered in bed to accept multiple po presentations of ice chips, solid cracker, puree, and thin liquids via spoon, cup, and straw.  He was able to self provide po trials.      Facial/oral structures were noted w/ a trace to mild left facial weakness in the region immediately below left eye only upon observation. This does not extend to labial edges w/ no weakness noted at left oral commissure and no drooping of labial edges noted. Lingual protrusion revealed no deviation. Oral mucosa were moist, pink, and clean. Secretions were clear, thin, and well controlled. OROM/DORY was wfl to imitate oral postures. Gag is not assessed. Volitional cough was intact w/ adequate  intensity, clear in quality, non-productive. Voice was adequate in intensity, clear in quality w/ intelligible speech.     Upon po presentations, adequate bolus anticipation and acceptance w/ good labial seal for bolus clearance via spoon bowl, cup rim stability and suction via straw. Bolus formation, manipulation and control were wfl w/ rotary mastication pattern. A-p transit was timely w/o significant oral residue appreciated. No overt s/s aspiration before the swallow. Left facial weakness  does not impact swallow function.       Pharyngeal swallow was timely w/ adequate hyolaryngeal elevation per palpation. No overt s/s aspiration evidenced across this evaluation. No silent aspiration suspected. Patient denied odynophagia.     Visit Dx:   Visit Diagnosis   No diagnosis found.     Problem List       Patient Active Problem List   Diagnosis    CVA (cerebral vascular accident)         Medical History   History reviewed. No pertinent past medical history.     Surgical History   History reviewed. No pertinent surgical history.        Impression:      Mr Andrews presented w/ a wfl oropharyngeal swallow w/o s/s aspiration and no s/s indicative of silent aspiration. He additionally was noted w/ no functional impacts from trace to mild left facial weakness. No odynophagia reported.      He is felt to most benefit from a least restrictive po diet of regular textures and thin liquids w/ medications whole in puree/thins per pt preference.      SLP Recommendation and Plan     1. Regular textures, thin liquids.    2. Medications whole in puree/thins. Per pt preference.   3. Upright and centered for all po intake  4. APRYL precautions.  5. Oral care protocol.  6. Universal aspiration precautions.      No further formal SLP f/u warranted/recommended at this time.     D/w patient results and recommendations w/ verbal agreement.     D/w RN results and recommendations w/ verbal agreement.     Thank you for allowing me to participate in the care of your patient-  Carey Coleman M.S., CCC-SLP  SLP Diet Recommendation: regular textures, thin liquids (09/25/24 1000)  SLP Rec. for Method of Medication Administration: meds whole, with thin liquids (09/25/24 1000)     SWALLOW EVALUATION (Last 72 Hours)            SLP Adult Swallow Evaluation         Row Name 09/25/24 1000                                   Rehab Evaluation     Document Type evaluation  -JR                       Oral Musculature and Cranial Nerve Assessment     Oral  Motor General Assessment WFL  -JR             Oral Motor, Comment --  L  -                       General Eating/Swallowing Observations     Respiratory Support Currently in Use room air  -             Eating/Swallowing Skills self-fed  -             Positioning During Eating upright in bed  -             Utensils Used spoon;cup;straw  -             Consistencies Trialed regular textures;pureed;thin liquids  -                       Clinical Swallow Eval     Oral Prep Phase WFL  -JR             Oral Transit WFL  -JR             Oral Residue WFL  -JR             Pharyngeal Phase no overt signs/symptoms of pharyngeal impairment  -             Clinical Swallow Evaluation Summary --  [facility]  -                       Recommendations     SLP Diet Recommendation regular textures;thin liquids  -             SLP Rec. for Method of Medication Administration meds whole;with thin liquids  -                          User Key  (r) = Recorded By, (t) = Taken By, (c) = Cosigned By        Initials Name Effective Dates     Carey Small, MS CCC-SLP 10/25/21 -                              EDUCATION  The patient has been educated in the following areas:   Dysphagia (Swallowing Impairment).         Time Calculation:     Time Calculation- SLP         Row Name 09/25/24 1101                       Time Calculation- [facility]     SLP Start Time 1000  -         SLP Stop Time 1030  -         SLP Time Calculation (min) 30 min  -         SLP Received On 09/25/24  -

## 2024-09-30 NOTE — PROGRESS NOTES
[facility]  PROGRESS NOTE     Patient Identification:  Name:  Anshul Andrews  Age:  69 y.o.  Sex:  male  :  1955  MRN:  [id number]  Visit Number:  [id number]  ROOM: [facility]     Primary Care Provider:  Provider, No Known    Length of stay in inpatient status:  6    Subjective     Chief Compliant: Status post CVA    History of Presenting Illness: 69-year-old gentleman who is status post right MCA distribution CVA.  Patient is doing very well and has made excellent progress with his therapy.  Patient will likely be discharged to home tomorrow.    Objective     Current Hospital Meds:apixaban, 5 mg, Oral, Q12H  aspirin, 81 mg, Oral, Daily  atorvastatin, 40 mg, Oral, Daily  empagliflozin, 10 mg, Oral, Daily  metoprolol tartrate, 50 mg, Oral, Q12H  nicotine, 1 patch, Transdermal, Q24H  spironolactone, 12.5 mg, Oral, Daily  vitamin B-12, 1,000 mcg, Oral, Daily       ----------------------------------------------------------------------------------------------------------------------  Vital Signs:  Temp:  [97.7 °F (36.5 °C)-97.9 °F (36.6 °C)] 97.7 °F (36.5 °C)  Heart Rate:  [] 78  Resp:  [18-20] 18  BP: (121-129)/(70-72) 129/72  SpO2:  [96 %-97 %] 97 %  on   ;   Device (Oxygen Therapy): room air  Body mass index is 29.47 kg/m².    Wt Readings from Last 3 Encounters:   24 116 kg (255 lb)     Intake & Output (last 3 days)         09/27 0701  09/28 0700 09/28 0701  09/29 0700 09/29 0701  09/30 0700 09/30 0701  10/01 0700    P.O. 2040 1440 1320 360    Total Intake(mL/kg)  (17.6) 1440 (12.4) 1320 (11.4) 360 (3.1)    Net + +1440 +1320 +360            Urine Unmeasured Occurrence 6 x 6 x 4 x 1 x    Stool Unmeasured Occurrence  1 x 1 x           Diet: Diabetic, Regular/House; Consistent Carbohydrate; Fluid Consistency: Thin (IDDSI 0)  ----------------------------------------------------------------------------------------------------------------------  Physical exam:  Constitutional: No acute  "distress  HEENT: Normocephalic atraumatic  Neck: Supple  Cardiovascular: Regular rate and rhythm  Pulmonary/Chest: Clear to auscultation  Abdominal:  .  Positive bowel sounds soft  Musculoskeletal: No arthropathy  Neurological: 4+ out of 5 strength on the left  Skin: No rash  Peripheral vascular: No edema  Genitourinary:  ----------------------------------------------------------------------------------------------------------------------    Last echocardiogram:    ----------------------------------------------------------------------------------------------------------------------  Results from last 7 days   Lab Units 09/25/24  0010   WBC 10*3/mm3 13.42*   HEMOGLOBIN g/dL 17.0   HEMATOCRIT % 54.1*   MCV fL 85.5   MCHC g/dL 31.4*   PLATELETS 10*3/mm3 291         Results from last 7 days   Lab Units 09/25/24  0010   SODIUM mmol/L 140   POTASSIUM mmol/L 4.6   CHLORIDE mmol/L 106   CO2 mmol/L 23.5   BUN mg/dL 32*   CREATININE mg/dL 1.17   CALCIUM mg/dL 9.2   GLUCOSE mg/dL 91   ALBUMIN g/dL 3.8   BILIRUBIN mg/dL 0.6   ALK PHOS U/L 128*   AST (SGOT) U/L 34   ALT (SGPT) U/L 44*   Estimated Creatinine Clearance: 86.3 mL/min (by C-G formula based on SCr of 1.17 mg/dL).  No results found for: \"AMMONIA\"              Glucose   Date/Time Value Ref Range Status   09/30/2024 0614 129 70 - 130 mg/dL Final   09/29/2024 1604 132 (H) 70 - 130 mg/dL Final   09/29/2024 0617 104 70 - 130 mg/dL Final   09/28/2024 1544 108 70 - 130 mg/dL Final   09/28/2024 0619 115 70 - 130 mg/dL Final   09/27/2024 1541 91 70 - 130 mg/dL Final     No results found for: \"TSH\", \"FREET4\"  No results found for: \"PREGTESTUR\", \"PREGSERUM\", \"HCG\", \"HCGQUANT\"  Pain Management Panel           No data to display              Brief Urine Lab Results       None          No results found for: \"BLOODCX\"      No results found for: \"URINECX\"  No results found for: \"WOUNDCX\"  No results found for: \"STOOLCX\"        I have personally looked at the labs and they are " summarized above.  ----------------------------------------------------------------------------------------------------------------------  Detailed radiology reports for the last 24 hours:    Imaging Results (Last 24 Hours)       ** No results found for the last 24 hours. **          Final impressions for the last 30 days of radiology reports:    No radiology results for the last 30 days.  I have personally looked at the radiology images and read the final radiology report.    Assessment & Plan    Status post right MCA distribution CVA continue Eliquis.  On September 28 requiring supervision to standby assist for transfers.  Able to ambulate 160 feet x 2 with standby assist.  Does have a limp but this is secondary to osteoarthritic changes involving the left knee.    Atrial fibrillation currently in sinus rhythm continue metoprolol and Eliquis therapy.    Diabetes mellitus excellent glycemic control continue Jardiance    CHF reduced EF compensated patient is currently on Jardiance, Aldactone and beta-blocker therapy.  Will arrange for outpatient cardiology follow-up.    Tobacco abuse recommend cessation    History of cardiac thrombi continue Eliquis    Polycythemia last hemoglobin measured at 17.    VTE Prophylaxis:   Eliqumichael Hillman MD  [facility]  09/30/24  11:02 EDT

## 2024-09-30 NOTE — PLAN OF CARE
Goal Outcome Evaluation:           Progress: improving       Problem: Rehabilitation (IRF) Plan of Care  Goal: Plan of Care Review  Outcome: Progressing  Flowsheets  Taken 9/30/2024 1025 by Melodie Perez RN  Progress: improving  Taken 9/30/2024 0000 by Ena Cast RN  Plan of Care Reviewed With: patient  Goal: Patient-Specific Goal (Individualized)  Outcome: Progressing  Goal: Absence of New-Onset Illness or Injury  Outcome: Progressing  Intervention: Prevent Fall and Fall Injury  Recent Flowsheet Documentation  Taken 9/30/2024 0800 by Melodie Perez RN  Safety Promotion/Fall Prevention:   nonskid shoes/slippers when out of bed   safety round/check completed  Intervention: Prevent Infection  Recent Flowsheet Documentation  Taken 9/30/2024 0800 by Melodie Perez RN  Infection Prevention: hand hygiene promoted  Goal: Optimal Comfort and Wellbeing  Outcome: Progressing  Goal: Home and Community Transition Plan Established  Outcome: Progressing     Problem: Diabetes Comorbidity  Goal: Blood Glucose Level Within Targeted Range  Outcome: Progressing  Intervention: Monitor and Manage Glycemia  Recent Flowsheet Documentation  Taken 9/30/2024 0830 by Melodie Perez RN  Glycemic Management: blood glucose monitored     Problem: Fall Injury Risk  Goal: Absence of Fall and Fall-Related Injury  Outcome: Progressing  Intervention: Identify and Manage Contributors  Recent Flowsheet Documentation  Taken 9/30/2024 0800 by Melodie Perez RN  Medication Review/Management: medications reviewed  Intervention: Promote Injury-Free Environment  Recent Flowsheet Documentation  Taken 9/30/2024 0800 by Melodie Perez RN  Safety Promotion/Fall Prevention:   nonskid shoes/slippers when out of bed   safety round/check completed     Problem: Skin Injury Risk Increased  Goal: Skin Health and Integrity  Outcome: Progressing  Intervention: Optimize Skin Protection  Recent Flowsheet Documentation  Taken 9/30/2024 0830 by Ana  TIFFANIE Sewell  Pressure Reduction Techniques: frequent weight shift encouraged  Pressure Reduction Devices: heel offloading device utilized  Skin Protection: adhesive use limited  Taken 9/30/2024 0800 by Melodie Perez RN  Head of Bed (HOB) Positioning: HOB elevated     Problem: Adjustment to Stroke (Stroke Rehabilitation)  Goal: Optimal Adjustment to Stroke  Outcome: Progressing     Problem: BADL (Basic Activities of Daily Living) Impairment (Stroke Rehabilitation)  Goal: Optimal Safe BADL Performance  Outcome: Progressing     Problem: Bowel Elimination Management (Stroke Rehabilitation)  Goal: Effective Bowel Elimination/Continence  Outcome: Progressing     Problem: Cognitive Impairment (Stroke Rehabilitation)  Goal: Optimal Cognitive Function  Outcome: Progressing     Problem: Communication Impairment (Stroke Rehabilitation)  Goal: Effective Communication Skills  Outcome: Progressing  Intervention: Optimize Communication Skills  Recent Flowsheet Documentation  Taken 9/30/2024 0830 by Melodie Perez RN  Communication Enhancement Strategies: call light answered in person     Problem: IADL (Instrumental Activities of Daily Living) Impairment (Stroke Rehabilitation)  Goal: Optimal Safe IADL Performance  Outcome: Progressing     Problem: Mobility Impairment (Stroke Rehabilitation)  Goal: Optimal Mobility [place] and Safety  Outcome: Progressing     Problem: Movement and Motor Control Impairment (Stroke Rehabilitation)  Goal: Optimal Movement and Motor Control  Outcome: Progressing     Problem: Sensory Perceptual Impairment (Stroke Rehabilitation)  Goal: Optimal Sensory Perceptual Status  Outcome: Progressing  Intervention: Facilitate Optimal Sensory Perceptual Status  Recent Flowsheet Documentation  Taken 9/30/2024 0830 by Melodie Perez RN  Sensory Impairment Compensatory Techniques: frequent position change encouraged     Problem: Sexuality and Intimacy (Stroke Rehabilitation)  Goal: Maintains Intimacy/Sexual  Expression  Outcome: Progressing     Problem: Spasticity (Stroke Rehabilitation)  Goal: Effective Spasticity Management  Outcome: Progressing     Problem: Swallowing Impairment (Stroke Rehabilitation)  Goal: Optimal Oral Motor and Swallow Ability  Outcome: Progressing     Problem: Urinary Elimination Management (Stroke Rehabilitation)  Goal: Effective Urinary Elimination/Continence  Outcome: Progressing  Intervention: Promote Urinary Elimination and Continence  Recent Flowsheet Documentation  Taken 9/30/2024 0830 by Melodie Perez, RN  Assistive Device Use (Bladder Management):   urinal   bedside commode

## 2024-09-30 NOTE — SIGNIFICANT NOTE
09/30/24 4017   Plan   Plan Spoke to pt about recommendations for PT/OT to provide home exercise programs at discharge.  DME is not recommended.  Pt has informed son about discharge on 10-1-24 and will tell him to come to rehab around 11:00 am for discharge and transport home.  MD completed paperwork for short-term disability.  SS faxed paperwork to [place] [employer] [phone].  Pt will go to son's home at discharge.

## 2024-09-30 NOTE — PROGRESS NOTES
Rehabilitation Nursing  Inpatient Rehabilitation Plan of Care Note    Plan of Care  Copy from POC    Signed by: Camelia Perez RN

## 2024-09-30 NOTE — PLAN OF CARE
Goal Outcome Evaluation:  Plan of Care Reviewed With: patient        Progress: improving  Outcome Evaluation: FALL SAFETY INTERVENTION LEVELS EDUCATION GIVEN; PROGRESSING WITH CURRENT THERAPIES; CONTINUE PLAN OF CARE; MONITOR

## 2024-09-30 NOTE — THERAPY TREATMENT NOTE
Inpatient Rehabilitation - Physical Therapy Treatment Note        Thom     Patient Name: Anshul Andrews  : 1955  MRN: [id number]    Today's Date: 2024                    Admit Date: 2024      Visit Dx:   No diagnosis found.    Patient Active Problem List   Diagnosis    CVA (cerebral vascular accident)       PT ASSESSMENT (Last 12 Hours)       IRF PT Evaluation and Treatment       Row Name 24 1617          PT Time and Intention    Document Type daily treatment  -RM     Mode of Treatment physical therapy  -RM     Patient/Family/Caregiver Comments/Observations No significant c/o noted this date; pt agreeable to treatment.  -RM       Row Name 24 161          Cognition/Psychosocial    Affect/Mental Status (Cognition) WFL  -RM     Orientation Status (Cognition) oriented x 4  -RM     Follows Commands (Cognition) WFL  -RM     Personal Safety Interventions gait belt;nonskid shoes/slippers when out of bed;fall prevention program maintained  -RM       Row Name 24 161          Bed Mobility    Bed Mobility supine-sit;sit-supine  -RM     Supine-Sit [place] (Bed Mobility) independent  -RM     Sit-Supine [place] (Bed Mobility) independent  -RM       Row Name 24 1617          Transfer Assessment/Treatment    Transfers sit-stand transfer;stand-sit transfer;chair-bed transfer  -RM       Row Name 24 161          Bed-Chair Transfer    Bed-Chair [place] (Transfers) independent  -RM       Row Name 24 161          Chair-Bed Transfer    Chair-Bed [place] (Transfers) modified independence;independent  -RM       Row Name 24 161          Sit-Stand Transfer    Sit-Stand [place] (Transfers) modified independence;independent  -RM       Row Name 24 161          Stand-Sit Transfer    Stand-Sit [place] (Transfers) modified independence;independent  -RM       Row Name 24 161          Gait/Stairs (Locomotion)    Gait/Stairs Locomotion  gait/ambulation independence;stairs negotiation  -RM     [place] Level (Gait) supervision;modified independence  -RM     Distance in Feet (Gait) --  320  -RM     Pattern (Gait) step-through  -RM     Bilateral Gait Deviations forward flexed posture  -RM     Comment, (Gait/Stairs) antalgic gait noted due to arthritic pain in L knee  -RM       Row Name 09/30/24 1617          Motor Skills    Therapeutic Exercise aerobic  -RM       Row Name 09/30/24 1617          Hip (Therapeutic Exercise)    Hip Strengthening (Therapeutic Exercise) bilateral;flexion;extension;aBduction;aDduction;heel slides;marching while seated;marching while standing;mini squats;sitting;standing;3 lb free weight;resistance band;blue;2 sets;10 repetitions  -RM       Row Name 09/30/24 1617          Knee (Therapeutic Exercise)    Knee Strengthening (Therapeutic Exercise) bilateral;flexion;extension;heel slides;marching while seated;marching while standing;LAQ (long arc quad);hamstring curls;sitting;standing;3 lb free weight;resistance band;blue;2 sets;10 repetitions  -RM       Row Name 09/30/24 1617          Ankle (Therapeutic Exercise)    Ankle Strengthening (Therapeutic Exercise) bilateral;dorsiflexion;plantarflexion;sitting;standing;3 lb free weight;2 sets;10 repetitions  -RM       Row Name 09/30/24 1617          Aerobic Exercise    Type (Aerobic Exercise) recumbent stationary bike  -RM     Time Performed (Aerobic Exercise) 10  -RM     Comment, Aerobic Exercise (Therapeutic Exercise) LVL 2.0  -RM       Row Name 09/30/24 1617          Positioning and Restraints    Pre-Treatment Position in bed  -RM     Post Treatment Position bed  -RM     In Bed sitting EOB;call light within reach;encouraged to call for assist  -RM       Row Name 09/30/24 1617          Daily Progress Summary (PT)    Functional Goal Overall Progress (PT) progressing toward functional goals as expected  -RM     Daily Progress Summary (PT) Pt mod I/independent with mobility at this  time. Fatigue noted with increased exertion; L knee pain reported due to pre-existing arthritis pain. Continued skilled care required for further LE strengthening to ensure maximum safe function upon D/C.  -RM     Impairments Still Limiting Function (PT) pain;functional activity tolerance impairment;strength deficit  -RM     Recommendations (PT) Continue per current POC  -RM               User Key  (r) = Recorded By, (t) = Taken By, (c) = Cosigned By      Initials Name Provider Type     India Romero, GÉNESIS Physical Therapist Assistant                     Physical Therapy Education       Title: PT OT SLP Therapies (Done)       Topic: Physical Therapy (Done)       Point: Mobility training (Done)       Learning Progress Summary             Patient Acceptance, E,TB, VU by  at 9/30/2024 1626    Acceptance, E,D, VU by  at 9/26/2024 1115    Acceptance, E,D, VU,NR by  at 9/25/2024 1502                         Point: Home exercise program (Done)       Learning Progress Summary             Patient Acceptance, E,TB, VU by  at 9/30/2024 1626    Acceptance, E,D, VU by AG at 9/26/2024 1115    Acceptance, E,D, VU,NR by  at 9/25/2024 1502                         Point: Body mechanics (Done)       Learning Progress Summary             Patient Acceptance, E,TB, VU by  at 9/30/2024 1626    Acceptance, E,D, VU by  at 9/26/2024 1115    Acceptance, E,D, VU,NR by  at 9/25/2024 1502                         Point: Precautions (Done)       Learning Progress Summary             Patient Acceptance, E,TB, VU by  at 9/30/2024 1626    Acceptance, E,D, VU by  at 9/26/2024 1115    Acceptance, E,D, VU,NR by  at 9/25/2024 1502                                         User Key       Initials Effective Dates Name Provider Type Discipline     06/16/21 -  Angie Cullen, PT Physical Therapist PT     02/17/23 -  India Romero PTA Physical Therapist Assistant PT                    PT Recommendation and  Plan          Daily Progress Summary (PT)  Functional Goal Overall Progress (PT): progressing toward functional goals as expected  Daily Progress Summary (PT): Pt mod I/independent with mobility at this time. Fatigue noted with increased exertion; L knee pain reported due to pre-existing arthritis pain. Continued skilled care required for further LE strengthening to ensure maximum safe function upon D/C.  Impairments Still Limiting Function (PT): pain, functional activity tolerance impairment, strength deficit  Recommendations (PT): Continue per current POC               Time Calculation:      PT Charges       Row Name 09/30/24 1626             Time Calculation    Start Time 1000  -RM      Stop Time 1130  -RM      Time Calculation (min) 90 min  -RM      PT Received On 09/30/24  -RM      PT - Next Appointment 10/01/24  -RM      PT Goal Re-Cert Due Date 10/02/24  -RM         Time Calculation- PT    Total Timed Code Minutes- PT 90 minute(s)  -RM                User Key  (r) = Recorded By, (t) = Taken By, (c) = Cosigned By      Initials Name Provider Type     India Romero PTA Physical Therapist Assistant                    Therapy Charges for Today       Code Description Service Date Service Provider Modifiers Qty    01762617654 HC GAIT TRAINING EA 15 MIN 9/30/2024 India Romero PTA GP, CQ 1    76930949275 HC PT THER PROC EA 15 MIN 9/30/2024 India Romero PTA GP, CQ 3    68669393230 HC PT NEUROMUSC RE EDUCATION EA 15 MIN 9/30/2024 India Romero PTA GP, CQ 1    01716259561 HC PT THERAPEUTIC ACT EA 15 MIN 9/30/2024 India Romero PTA GP, CQ 1              PT G-Codes  AM-PAC 6 Clicks Score (PT): 22      India Romero PTA  9/30/2024

## 2024-09-30 NOTE — PLAN OF CARE
Problem: Rehabilitation (IRF) Plan of Care  Goal: Plan of Care Review  Outcome: Progressing  Flowsheets (Taken 9/30/2024 0000)  Progress: no change  Plan of Care Reviewed With: patient  Goal: Patient-Specific Goal (Individualized)  Outcome: Progressing  Goal: Absence of New-Onset Illness or Injury  Outcome: Progressing  Intervention: Prevent Fall and Fall Injury  Recent Flowsheet Documentation  Taken 9/29/2024 2200 by Ena Cast, RN  Safety Promotion/Fall Prevention:   safety round/check completed   room organization consistent   nonskid shoes/slippers when out of bed   mobility aid in reach   fall prevention program maintained   elopement precautions   clutter free environment maintained   assistive device/personal items within reach  Taken 9/29/2024 2000 by Ena Cast, RN  Safety Promotion/Fall Prevention:   safety round/check completed   room organization consistent   nonskid shoes/slippers when out of bed   mobility aid in reach   fall prevention program maintained   elopement precautions   clutter free environment maintained   assistive device/personal items within reach  Intervention: Prevent Infection  Recent Flowsheet Documentation  Taken 9/29/2024 2200 by Ena Cast, RN  Infection Prevention:   personal protective equipment utilized   rest/sleep promoted   single patient room provided  Taken 9/29/2024 2000 by Ena Cast, RN  Infection Prevention:   personal protective equipment utilized   rest/sleep promoted   single patient room provided  Goal: Optimal Comfort and Wellbeing  Outcome: Progressing  Goal: Home and Community Transition Plan Established  Outcome: Progressing     Problem: Diabetes Comorbidity  Goal: Blood Glucose Level Within Targeted Range  Outcome: Progressing     Problem: Fall Injury Risk  Goal: Absence of Fall and Fall-Related Injury  Outcome: Progressing  Intervention: Identify and Manage Contributors  Recent Flowsheet  Documentation  Taken 9/29/2024 2200 by Ena Cast, RN  Medication Review/Management: medications reviewed  Taken 9/29/2024 2000 by Ena Cast, RN  Medication Review/Management: medications reviewed  Intervention: Promote Injury-Free Environment  Recent Flowsheet Documentation  Taken 9/29/2024 2200 by Ena Cast, RN  Safety Promotion/Fall Prevention:   safety round/check completed   room organization consistent   nonskid shoes/slippers when out of bed   mobility aid in reach   fall prevention program maintained   elopement precautions   clutter free environment maintained   assistive device/personal items within reach  Taken 9/29/2024 2000 by Ena Cast, RN  Safety Promotion/Fall Prevention:   safety round/check completed   room organization consistent   nonskid shoes/slippers when out of bed   mobility aid in reach   fall prevention program maintained   elopement precautions   clutter free environment maintained   assistive device/personal items within reach     Problem: Skin Injury Risk Increased  Goal: Skin Health and Integrity  Outcome: Progressing     Problem: Adjustment to Stroke (Stroke Rehabilitation)  Goal: Optimal Adjustment to Stroke  Outcome: Progressing     Problem: BADL (Basic Activities of Daily Living) Impairment (Stroke Rehabilitation)  Goal: Optimal Safe BADL Performance  Outcome: Progressing     Problem: Bowel Elimination Management (Stroke Rehabilitation)  Goal: Effective Bowel Elimination/Continence  Outcome: Progressing     Problem: Cognitive Impairment (Stroke Rehabilitation)  Goal: Optimal Cognitive Function  Outcome: Progressing     Problem: Communication Impairment (Stroke Rehabilitation)  Goal: Effective Communication Skills  Outcome: Progressing     Problem: IADL (Instrumental Activities of Daily Living) Impairment (Stroke Rehabilitation)  Goal: Optimal Safe IADL Performance  Outcome: Progressing     Problem: Mobility Impairment  (Stroke Rehabilitation)  Goal: Optimal Mobility [place] and Safety  Outcome: Progressing     Problem: Movement and Motor Control Impairment (Stroke Rehabilitation)  Goal: Optimal Movement and Motor Control  Outcome: Progressing     Problem: Sensory Perceptual Impairment (Stroke Rehabilitation)  Goal: Optimal Sensory Perceptual Status  Outcome: Progressing     Problem: Sexuality and Intimacy (Stroke Rehabilitation)  Goal: Maintains Intimacy/Sexual Expression  Outcome: Progressing     Problem: Spasticity (Stroke Rehabilitation)  Goal: Effective Spasticity Management  Outcome: Progressing     Problem: Swallowing Impairment (Stroke Rehabilitation)  Goal: Optimal Oral Motor and Swallow Ability  Outcome: Progressing     Problem: Urinary Elimination Management (Stroke Rehabilitation)  Goal: Effective Urinary Elimination/Continence  Outcome: Progressing   Goal Outcome Evaluation:  Plan of Care Reviewed With: patient        Progress: no change

## 2024-09-30 NOTE — PROGRESS NOTES
Rehabilitation Nursing  Inpatient Rehabilitation Plan of Care Note    Plan of Care  Mobility    Bed/Chair/Wheelchair (Active)  Current Status (9/24/2024 12:00:00 AM): Increase physical activity  Weekly Goal: Joint mobility maintained  Discharge Goal: Demonstrates use of adaptive equipment    Safety    Potential for Injury (Active)  Current Status (9/24/2024 12:00:00 AM): Free from injury this shift  Weekly Goal: Prevent falls this week  Discharge Goal: Identifies measures to prevent injury; no falls this  hospitalization    Signed by: Camelia Perez RN

## 2024-09-30 NOTE — SIGNIFICANT NOTE
09/30/24 0810   Plan   Plan Pt informed SS he will be discharged home on 10-1-24.  MD confirmed discharge plan.  Pt has some short-term disability paperwork for MD to complete.

## 2024-09-30 NOTE — PROGRESS NOTES
Occupational Therapy:    Physical Therapy: Individual: 90 minutes.    Speech Language Pathology:    Signed by: India Romero PTA

## 2024-10-01 VITALS
HEIGHT: 78 IN | DIASTOLIC BLOOD PRESSURE: 78 MMHG | TEMPERATURE: 97.9 F | OXYGEN SATURATION: 97 % | BODY MASS INDEX: 29.5 KG/M2 | RESPIRATION RATE: 18 BRPM | HEART RATE: 64 BPM | WEIGHT: 255 LBS | SYSTOLIC BLOOD PRESSURE: 130 MMHG

## 2024-10-01 LAB — GLUCOSE BLDC GLUCOMTR-MCNC: 103 MG/DL (ref 70–130)

## 2024-10-01 PROCEDURE — 82948 REAGENT STRIP/BLOOD GLUCOSE: CPT

## 2024-10-01 PROCEDURE — 97535 SELF CARE MNGMENT TRAINING: CPT

## 2024-10-01 PROCEDURE — 97530 THERAPEUTIC ACTIVITIES: CPT

## 2024-10-01 PROCEDURE — 99238 HOSP IP/OBS DSCHRG MGMT 30/<: CPT | Performed by: FAMILY MEDICINE

## 2024-10-01 RX ORDER — METOPROLOL TARTRATE 50 MG
50 TABLET ORAL EVERY 12 HOURS SCHEDULED
Qty: 60 TABLET | Refills: 0 | Status: SHIPPED | OUTPATIENT
Start: 2024-10-01 | End: 2024-10-31

## 2024-10-01 RX ORDER — ASPIRIN 81 MG/1
81 TABLET ORAL DAILY
Qty: 30 TABLET | Refills: 0 | Status: SHIPPED | OUTPATIENT
Start: 2024-10-02 | End: 2024-11-01

## 2024-10-01 RX ORDER — ATORVASTATIN CALCIUM 40 MG/1
40 TABLET, FILM COATED ORAL DAILY
Qty: 90 TABLET | Refills: 0 | Status: SHIPPED | OUTPATIENT
Start: 2024-10-02 | End: 2024-12-31

## 2024-10-01 RX ORDER — SPIRONOLACTONE 25 MG/1
12.5 TABLET ORAL DAILY
Qty: 15 TABLET | Refills: 0 | Status: SHIPPED | OUTPATIENT
Start: 2024-10-02 | End: 2024-11-01

## 2024-10-01 RX ADMIN — APIXABAN 5 MG: 5 TABLET, FILM COATED ORAL at 09:15

## 2024-10-01 RX ADMIN — METOPROLOL TARTRATE 50 MG: 50 TABLET, FILM COATED ORAL at 09:15

## 2024-10-01 RX ADMIN — ATORVASTATIN CALCIUM 40 MG: 40 TABLET, FILM COATED ORAL at 09:16

## 2024-10-01 RX ADMIN — EMPAGLIFLOZIN 10 MG: 10 TABLET, FILM COATED ORAL at 09:15

## 2024-10-01 RX ADMIN — SPIRONOLACTONE 12.5 MG: 25 TABLET ORAL at 09:15

## 2024-10-01 RX ADMIN — ASPIRIN 81 MG: 81 TABLET, COATED ORAL at 09:15

## 2024-10-01 RX ADMIN — Medication 1000 MCG: at 09:15

## 2024-10-01 NOTE — PROGRESS NOTES
"Patient Assessment Instrument  Quality Indicators - Discharge FY 2023    Section A. Transportation      Section A. Medication List  Medication List to Subsequent Provider:  Not applicable.  Patient was not  discharged to a subsequent provider.  Discharge Location:  01 - Home  Medication List to Patient at Discharge:  Yes - Current reconciled medication  list provided to the patient, family and/or caregiver  Route(s) of Medication List Transmission to Patient:  Paper-based (e.g., fax,  copies, printouts)    Section B. Health Literacy  Frequency of Needing Assistance Reading:  Never    Section C. BIMS  Brief Interview for Mental Status (BIMS) was conducted.  Repetition of Three Words: Three words  Able to report correct year: Correct  Able to report correct month: Accurate within 5 days  Able to report correct day of the week: Correct  Able to recall \"sock\": Yes, no cue required  Able to recall \"blue\": Yes, no cue required  Able to recall \"bed\": Yes, no cue required    BIMS SUMMARY SCORE: 15 Cognitively intact    Section C. Signs and Symptoms of Delirium (from CAM)  Acute Change in Mental Status:   No  Inattention:   Behavior not present  Disorganized Thinking:   Behavior not present  Altered Level of Consciousness:   Behavior not present    Section D. Mood  Presence of little interest or pleasure in doing things:   No  Frequency of having little interest or pleasure in doing things:   Never or 1  day  Presence of feeling down, depressed, or hopeless:   No  Frequency of feeling down, depressed, or hopeless:   Never or 1 day   Interview Ended. Above responses do not meet criteria to continue  Total Severity Score:   00    Section D. Social Isolation  Frequency of Feeling Lonely or Isolated:  Never    Section GG. Self-Care Performance      Section GG. Mobility Performance      Section J. Health Conditions Discharge      Section J. Health Conditions (Pain)  Pain Effect on Sleep:   Rarely or not at all  Pain Interference " with Therapy Activities:   Rarely or not at all  Pain Interference with Day-to-Day Activities:   Rarely or not at all    Section K. Swallowing/Nutritional Status  Nutritional Approaches Past 7 Days:  Nutritional Approaches at Discharge:    Section M. Skin Conditions Discharge  Unhealed Pressure Ulcer(s)/Injurie(s) at Stage 1 or Higher:  No    . Current Number of Unhealed Pressure Ulcers      Section N. Medication        Section O. Special Treatments, Procedures, and Programs    Signed by: Huseyin Llanos RN

## 2024-10-01 NOTE — PLAN OF CARE
Goal Outcome Evaluation:  Plan of Care Reviewed With: patient                Problem: Rehabilitation (IRF) Plan of Care  Goal: Plan of Care Review  Outcome: Met  Goal: Patient-Specific Goal (Individualized)  Outcome: Met  Goal: Absence of New-Onset Illness or Injury  Outcome: Met  Intervention: Prevent Fall and Fall Injury  Recent Flowsheet Documentation  Taken 10/1/2024 0800 by Huseyin Llanos RN  Safety Promotion/Fall Prevention: safety round/check completed  Intervention: Prevent Infection  Recent Flowsheet Documentation  Taken 10/1/2024 0800 by Huseyin Llanos RN  Infection Prevention:   hand hygiene promoted   rest/sleep promoted  Intervention: Prevent VTE (Venous Thromboembolism)  Recent Flowsheet Documentation  Taken 10/1/2024 0800 by Huseyin Llanos RN  VTE Prevention/Management: (eliquis) other (see comments)  Goal: Optimal Comfort and Wellbeing  Outcome: Met  Goal: Home and Community Transition Plan Established  Outcome: Met     Problem: Diabetes Comorbidity  Goal: Blood Glucose Level Within Targeted Range  Outcome: Met  Intervention: Monitor and Manage Glycemia  Recent Flowsheet Documentation  Taken 10/1/2024 0800 by Huseyin Llanos RN  Glycemic Management: blood glucose monitored     Problem: Fall Injury Risk  Goal: Absence of Fall and Fall-Related Injury  Outcome: Met  Intervention: Identify and Manage Contributors  Recent Flowsheet Documentation  Taken 10/1/2024 0800 by Huseyin Llanos RN  Medication Review/Management: medications reviewed  Intervention: Promote Injury-Free Environment  Recent Flowsheet Documentation  Taken 10/1/2024 0800 by Huseyin Llanos RN  Safety Promotion/Fall Prevention: safety round/check completed     Problem: Skin Injury Risk Increased  Goal: Skin Health and Integrity  Outcome: Met  Intervention: Promote and Optimize Oral Intake  Recent Flowsheet Documentation  Taken 10/1/2024 0800 by Huseyin Llanos RN  Oral Nutrition Promotion: physical activity promoted  Intervention: Optimize  Skin Protection  Recent Flowsheet Documentation  Taken 10/1/2024 0800 by Huseyin Llanos RN  Pressure Reduction Techniques:   frequent weight shift encouraged   heels elevated off bed   weight shift assistance provided  Pressure Reduction Devices:   heel offloading device utilized   positioning supports utilized   pressure-redistributing mattress utilized  Skin Protection:   adhesive use limited   incontinence pads utilized     Problem: Adjustment to Stroke (Stroke Rehabilitation)  Goal: Optimal Adjustment to Stroke  Outcome: Met     Problem: BADL (Basic Activities of Daily Living) Impairment (Stroke Rehabilitation)  Goal: Optimal Safe BADL Performance  Outcome: Met     Problem: Bowel Elimination Management (Stroke Rehabilitation)  Goal: Effective Bowel Elimination/Continence  Outcome: Met  Intervention: Promote Bowel Elimination and Continence  Recent Flowsheet Documentation  Taken 10/1/2024 0800 by Huseyin Llanos RN  Device Use (Bowel Management): bedside commode     Problem: Cognitive Impairment (Stroke Rehabilitation)  Goal: Optimal Cognitive Function  Outcome: Met  Intervention: Optimize Cognitive Function  Recent Flowsheet Documentation  Taken 10/1/2024 0800 by Huseyin Llanos RN  Sensory Stimulation Regulation: care clustered     Problem: Communication Impairment (Stroke Rehabilitation)  Goal: Effective Communication Skills  Outcome: Met  Intervention: Optimize Communication Skills  Recent Flowsheet Documentation  Taken 10/1/2024 0800 by Huseyin Llanos RN  Communication Enhancement Strategies: call light answered in person     Problem: IADL (Instrumental Activities of Daily Living) Impairment (Stroke Rehabilitation)  Goal: Optimal Safe IADL Performance  Outcome: Met     Problem: Mobility Impairment (Stroke Rehabilitation)  Goal: Optimal Mobility [place] and Safety  Outcome: Met     Problem: Movement and Motor Control Impairment (Stroke Rehabilitation)  Goal: Optimal Movement and Motor Control  Outcome:  Met     Problem: Sensory Perceptual Impairment (Stroke Rehabilitation)  Goal: Optimal Sensory Perceptual Status  Outcome: Met  Intervention: Facilitate Optimal Sensory Perceptual Status  Recent Flowsheet Documentation  Taken 10/1/2024 0800 by Huseyin Llanos RN  Sensory Impairment Compensatory Techniques:   frequent position change encouraged   skin protection techniques promoted     Problem: Sexuality and Intimacy (Stroke Rehabilitation)  Goal: Maintains Intimacy/Sexual Expression  Outcome: Met     Problem: Spasticity (Stroke Rehabilitation)  Goal: Effective Spasticity Management  Outcome: Met     Problem: Swallowing Impairment (Stroke Rehabilitation)  Goal: Optimal Oral Motor and Swallow Ability  Outcome: Met     Problem: Urinary Elimination Management (Stroke Rehabilitation)  Goal: Effective Urinary Elimination/Continence  Outcome: Met  Intervention: Promote Urinary Elimination and Continence  Recent Flowsheet Documentation  Taken 10/1/2024 0800 by Huseyin Llanos RN  Assistive Device Use (Bladder Management): urinal

## 2024-10-01 NOTE — PROGRESS NOTES
Rehabilitation Nursing  Inpatient Rehabilitation Plan of Care Note    Plan of Care  Copy from POC    Mobility    Bed/Chair/Wheelchair (Active)  Current Status (9/24/2024 12:00:00 AM): Increase physical activity  Weekly Goal: Joint mobility maintained  Discharge Goal: Demonstrates use of adaptive equipment    Safety    Potential for Injury (Active)  Current Status (9/24/2024 12:00:00 AM): Free from injury this shift  Weekly Goal: Prevent falls this week  Discharge Goal: Identifies measures to prevent injury; no falls this  hospitalization    RN Interventions    Mobility -  RN: Assess mobility every shift; Assess skin; Assist with position and mobility;  PT/OT consults. [RN]    Safety -  Team: Assess mental status; reorient as needed; Bedside personal items within  reach; call light within reach; adequate lighting; nonskid footwear.   [RN]    Self Care -  OT: ADL retraining/AE education, fxal mobility, BUE ther ex/act, GMC/FMC [RN]    Signed by: Nurse Rakesh

## 2024-10-01 NOTE — THERAPY DISCHARGE NOTE
Inpatient Rehabilitation - Physical Therapy Treatment Note/Discharge   [facility]     Patient Name: Anshul Andrews  : 1955  MRN: [id number]  Today's Date: 10/1/2024                Admit Date: 2024    Visit Dx:  No diagnosis found.  Patient Active Problem List   Diagnosis    CVA (cerebral vascular accident)       PT ASSESSMENT (Last 12 Hours)       IRF PT Evaluation and Treatment       Row Name 10/01/24 1547          PT Time and Intention    Document Type other (see comments)  Discharge summary  -RM     Mode of Treatment physical therapy  -RM     Patient/Family/Caregiver Comments/Observations D/C planning performed with pt and pts son this date. Education provided on techniques for home safety and importance of compliance with HEP for continued LE strengthening. No questions or concerns noted.  -RM       Row Name 10/01/24 1547          Cognition/Psychosocial    Affect/Mental Status (Cognition) WFL  -RM     Orientation Status (Cognition) oriented x 4  -RM     Follows Commands (Cognition) WFL  -RM       Row Name 10/01/24 1547          Positioning and Restraints    Pre-Treatment Position in bed  -RM     Post Treatment Position bed  -RM     In Bed supine;call light within reach;encouraged to call for assist  -RM       Row Name 10/01/24 1547          Transfer Goal 1 (PT-IRF)    Progress/Outcomes (Transfer Goal 1, PT-IRF) goal met  -RM       Row Name 10/01/24 1547          Gait/Walking Locomotion Goal 1 (PT-IRF)    Progress/Outcomes (Gait/Walking Locomotion Goal 1, PT-IRF) goal met  -RM       Row Name 10/01/24 1547          Stairs Goal 1 (PT-IRF)    Progress/Outcomes (Stairs Goal 1, PT-IRF) goal met  -RM               User Key  (r) = Recorded By, (t) = Taken By, (c) = Cosigned By      Initials Name Provider Type    RM India Romero, PTA Physical Therapist Assistant                    Physical Therapy Education       Title: PT OT SLP Therapies (Resolved)       Topic: Physical Therapy (Resolved)        Point: Mobility training (Resolved)       Learning Progress Summary             Patient Acceptance, E,TB, VU by RM at 9/30/2024 1626    Acceptance, E,D, VU by AG at 9/26/2024 1115    Acceptance, E,D, VU,NR by AG at 9/25/2024 1502                         Point: Home exercise program (Resolved)       Learning Progress Summary             Patient Acceptance, E,TB, VU by RM at 9/30/2024 1626    Acceptance, E,D, VU by AG at 9/26/2024 1115    Acceptance, E,D, VU,NR by AG at 9/25/2024 1502                         Point: Body mechanics (Resolved)       Learning Progress Summary             Patient Acceptance, E,TB, VU by RM at 9/30/2024 1626    Acceptance, E,D, VU by AG at 9/26/2024 1115    Acceptance, E,D, VU,NR by  at 9/25/2024 1502                         Point: Precautions (Resolved)       Learning Progress Summary             Patient Acceptance, E,TB, VU by  at 9/30/2024 1626    Acceptance, E,D, VU by AG at 9/26/2024 1115    Acceptance, E,D, VU,NR by AG at 9/25/2024 1502                                         User Key       Initials Effective Dates Name Provider Type Discipline     06/16/21 -  Angie Cullen, PT Physical Therapist PT     02/17/23 -  India Romero, PTA Physical Therapist Assistant PT                    PT Recommendation and Plan        Daily Progress Summary (PT)  Functional Goal Overall Progress (PT): progressing toward functional goals as expected  Daily Progress Summary (PT): Pt mod I/independent with mobility at this time. Fatigue noted with increased exertion; L knee pain reported due to pre-existing arthritis pain. Continued skilled care required for further LE strengthening to ensure maximum safe function upon D/C.  Impairments Still Limiting Function (PT): pain, functional activity tolerance impairment, strength deficit  Recommendations (PT): Continue per current POC         Time Calculation:    PT Charges       Row Name 10/01/24 7712             Time Calculation- PT    Total  Timed Code Minutes- PT 15 minute(s)  -                User Key  (r) = Recorded By, (t) = Taken By, (c) = Cosigned By      Initials Name Provider Type     India Romero PTA Physical Therapist Assistant                    Therapy Charges for Today       Code Description Service Date Service Provider Modifiers Qty    90586848109 HC GAIT TRAINING EA 15 MIN 9/30/2024 India Romero, GÉNESIS GP, CQ 1    98910990814 HC PT THER PROC EA 15 MIN 9/30/2024 India Romero, GÉNESIS GP, CQ 3    25196605311 HC PT NEUROMUSC RE EDUCATION EA 15 MIN 9/30/2024 India Romero, GÉNESIS GP, CQ 1    74232921901 HC PT THERAPEUTIC ACT EA 15 MIN 9/30/2024 India Romero, GÉNESIS GP, CQ 1    [id number] HC PT THERAPEUTIC ACT EA 15 MIN 10/1/2024 India Romero, GÉNESIS GP, CQ 1            PT G-Codes  AM-PAC 6 Clicks Score (PT): 22         India Romero PTA  10/1/2024

## 2024-10-01 NOTE — SIGNIFICANT NOTE
10/01/24 1100   Plan   Plan Spoke to son and pt.  Explained PT/OT recommended home exercise programs and he did not need DME.  Discussed getting insurance plan switched to a [place] provider if he plans to move to this state.  Informed him about Medicare open enrollment starting the middle of this month until the first week of December.   Son will provide transportation.   Pt is going home with son Yogesh.  No other needs voiced at this time.   Patient/Family in Agreement with Plan yes   Final Discharge Disposition Code 01 - home or self-care

## 2024-10-01 NOTE — PROGRESS NOTES
Patient Assessment Instrument  Quality Indicators - Discharge FY 2023    Section A. Transportation      Section A. Medication List        Section B. Health Literacy      Section C. BIMS      Section C. Signs and Symptoms of Delirium (from CAM)      Section D. Mood      Section D. Social Isolation      Section GG. Self-Care Performance      Section GG. Mobility Performance     Roll Left and Right: Patient completed the activities by themself with no  assistance from a helper.   Sit to Lying: Patient completed the activities by themself with no assistance  from a helper.   Lying to Sitting on Side of Bed: Patient completed the activities by themself  with no assistance from a helper.   Sit to Stand: Patient completed the activities by themself with no assistance  from a helper.   Chair/Bed to Chair Transfer: Patient completed the activities by themself with  no assistance from a helper.   Toilet Transfer Patient completed the activities by themself with no assistance  from a helper.   Car Transfer: Patient completed the activities by themself with no assistance  from a helper.   Walk 10 Feet:   Patient completed the activities by themself with no assistance  from a helper.  Walk 50 Feet with 2 Turns:   Patient completed the activities by themself with  no assistance from a helper.  Walk 150 Feet:   Patient completed the activities by themself with no assistance  from a helper.  Walking 10 Feet on Uneven Surfaces:   Not attempted due to medical or safety  concerns.  1 Step Over Curb or Up/Down Stair:   [place] provides verbal cues and/or  touching/steadying and/or contact guard assistance as patient completes  activity. Assistance may be provided throughout the activity or intermittently.  4 Steps Up and Down, With/Without Rail:   [place] provides verbal cues and/or  touching/steadying and/or contact guard assistance as patient completes  activity. Assistance may be provided throughout the activity or intermittently.  12  Steps Up and Down, With/Without Rail:   [place] provides verbal cues and/or  touching/steadying and/or contact guard assistance as patient completes  activity. Assistance may be provided throughout the activity or intermittently.  Picking up an Object:   Not attempted due to medical or safety concerns. Uses  Wheelchair and/or Scooter: No    Section J. Health Conditions Discharge      Section J. Health Conditions (Pain)      Section K. Swallowing/Nutritional Status  Nutritional Approaches Past 7 Days:  Nutritional Approaches at Discharge:    Section M. Skin Conditions Discharge      . Current Number of Unhealed Pressure Ulcers      Section N. Medication        Section O. Special Treatments, Procedures, and Programs    Signed by: India Romero PTA

## 2024-10-01 NOTE — PROGRESS NOTES
Occupational Therapy: Individual: 10 minutes.    Physical Therapy:    Speech Language Pathology:    Signed by: Melisa Alaniz OT

## 2024-10-01 NOTE — DISCHARGE SUMMARY
[facility] HOSPITALISTS DISCHARGE SUMMARY    Patient Identification:  Name:  Anshul Andrews  Age:  69 y.o.  Sex:  male  :  1955  MRN:  [id number]  Visit Number:  [id number]    Date of Admission: 2024  Date of Discharge:  10/1/2024     PCP: Rosaura Guzman APRN    DISCHARGE DIAGNOSIS  Status post right MCA distribution CVA--status post TNK and subsequent thrombectomy    Atrial fibrillation, paroxysmal  Diabetes mellitus  Hypertension  CHF with reduced EF  Polycythemia  History of cardiac thrombi  Tobacco use    CONSULTS       PROCEDURES PERFORMED      HOSPITAL COURSE  Patient is a 69 y.o. male presented to [facility] acute inpatient physical rehab in transfer from [facility].  Patient is states that he is a  by living and states that that he developed sudden left facial droop and was unable to speak and had left-sided weakness and was taken immediately to the hospital.  Patient reportedly got TNK and subsequent thrombectomy.  Patient was initially almost flaccid on the left side but afterwards by the next day after treatment had almost complete resolution of symptoms.  Patient was noted to have left atrial thrombus and was started on Eliquis therapy.  Afterwards it was thought patient would be a good candidate for inpatient physical rehab.    Status post right MCA distribution CVA--have continued patient on Eliquis throughout the admission without difficulty.  Patient is also on aspirin and Lipitor therapy..  At the time of admission, patient required minimum assistance with bathing; set up for upper body dressing; minimum assist with lower body dressing; set up with grooming; contact-guard to minimum assist for toileting.  At the time of admission was independent for bed mobility; standby assist required for transfers; able to ambulate 300 feet with no assistive device and standby assistance.  Was evaluated by speech therapy at the time of admission  and was swallowing without difficulty and no formal speech therapy was required.  Patient speech also within normal limits.  At the time of discharge patient requiring supervision for bathing; set up for upper body dressing; set up for her supervision for lower body dressing; set up for grooming; supervision for toileting.  Modified independent for transfers.  Ambulating 320 feet with supervision.  Does have a slight antalgic gait secondary to arthritic left knee.  I did discuss with patient and at this time patient should be able to operate motor vehicle.    Atrial fibrillation, paroxysmal--patient has been in sinus rhythm during the admission.  We have continued patient on Eliquis.  Patient also on metoprolol 50 mg twice daily    CHF with reduced EF patient is compensated and has been throughout the admission we have continued patient on Jardiance 10 mg daily, metoprolol, Aldactone.  Patient would likely benefit from outpatient evaluation of CHF is his diagnosis of reduced EF was apparently new while at [facility].  Patient may benefit from further cardiology workup.    Diabetes mellitus--hemoglobin A1c is 6.2.  Will continue Jardiance at this time patient blood sugars been well-controlled throughout the admission    Tobacco abuse recommend cessation    Polycythemia last hemoglobin was 17.  May improve with smoking cessation      VITAL SIGNS:  Temp:  [97.7 °F (36.5 °C)-97.9 °F (36.6 °C)] 97.9 °F (36.6 °C)  Heart Rate:  [61-64] 64  Resp:  [18] 18  BP: (110-140)/(64-82) 130/78  SpO2:  [93 %-97 %] 97 %  on   ;   Device (Oxygen Therapy): room air    Body mass index is 29.47 kg/m².  Wt Readings from Last 3 Encounters:   09/24/24 116 kg (255 lb)       PHYSICAL EXAM:  Constitutional: No acute distress  HEENT: Normocephalic atraumatic  Neck:   Supple  Cardiovascular: Regular rate and rhythm  Pulmonary/Chest: Clear  Abdominal: Positive bowel sounds soft.   Musculoskeletal: Does have some arthritic changes in  the left knee  Neurological: No focal deficits at this point  Skin: No rash  Peripheral vascular: No edema  Genitourinary::    DISCHARGE DISPOSITION   Stable    DISCHARGE MEDICATIONS:     Discharge Medications        New Medications        Instructions Start Date   apixaban 5 MG tablet tablet  Commonly known as: ELIQUIS   5 mg, Oral, Every 12 Hours Scheduled      aspirin 81 MG EC tablet   81 mg, Oral, Daily   Start Date: October 2, 2024     atorvastatin 40 MG tablet  Commonly known as: LIPITOR   40 mg, Oral, Daily   Start Date: October 2, 2024     cyanocobalamin 1000 MCG tablet  Commonly known as: VITAMIN B-12   1,000 mcg, Oral, Daily   Start Date: October 2, 2024     empagliflozin 10 MG tablet tablet  Commonly known as: JARDIANCE   10 mg, Oral, Daily   Start Date: October 2, 2024     metoprolol tartrate 50 MG tablet  Commonly known as: LOPRESSOR   50 mg, Oral, Every 12 Hours Scheduled      spironolactone 25 MG tablet  Commonly known as: ALDACTONE   12.5 mg, Oral, Daily   Start Date: October 2, 2024               Your medication list        START taking these medications        Instructions Last Dose Given Next Dose Due   apixaban 5 MG tablet tablet  Commonly known as: ELIQUIS      Take 1 tablet by mouth Every 12 (Twelve) Hours. Indications: Atrial Fibrillation       aspirin 81 MG EC tablet  Start taking on: October 2, 2024      Take 1 tablet by mouth Daily for 30 days.       atorvastatin 40 MG tablet  Commonly known as: LIPITOR  Start taking on: October 2, 2024      Take 1 tablet by mouth Daily for 90 days.       cyanocobalamin 1000 MCG tablet  Commonly known as: VITAMIN B-12  Start taking on: October 2, 2024      Take 1 tablet by mouth Daily for 30 days.       empagliflozin 10 MG tablet tablet  Commonly known as: JARDIANCE  Start taking on: October 2, 2024      Take 1 tablet by mouth Daily for 30 days.       metoprolol tartrate 50 MG tablet  Commonly known as: LOPRESSOR      Take 1 tablet by mouth Every 12  (Twelve) Hours for 30 days.       spironolactone 25 MG tablet  Commonly known as: ALDACTONE  Start taking on: October 2, 2024      Take 0.5 tablets by mouth Daily for 30 days.                 Where to Get Your Medications        These medications were sent to [facility] [address] [place] [address] [address] [address] - [phone]  - [phone] FX  [address] [address]      Phone: [phone]   apixaban 5 MG tablet tablet  aspirin 81 MG EC tablet  atorvastatin 40 MG tablet  cyanocobalamin 1000 MCG tablet  empagliflozin 10 MG tablet tablet  metoprolol tartrate 50 MG tablet  spironolactone 25 MG tablet         Diet Instructions       Diet: Diabetic Diets; Consistent Carbohydrate; Regular (IDDSI 7); Thin (IDDSI 0)      Discharge Diet: Diabetic Diets    Diabetic Diet: Consistent Carbohydrate    Texture: Regular (IDDSI 7)    Fluid Consistency: Thin (IDDSI 0)          Activity Instructions       Activity as Tolerated            Future Appointments   Date Time Provider Department Center   10/3/2024  1:15 PM [facility]   10/15/2024  1:15 PM India Cameron APRN [facility]     Additional Instructions for the Follow-ups that You Need to Schedule       Discharge Follow-up with PCP   As directed       Currently Documented PCP:    Charlette, No Known    PCP Phone Number:    [phone]     Follow Up Details: rosaura rodriguez on 10/3(keep appt)---needs followup cbc/bmp        [facility] We[facility]   As directed      To: [facility]   Follow Up: 2 Weeks   Follow Up Details: followup atrial fibrillaiton/chf               Follow-up Information       Rosaura Rodriguez APRN. Go on 10/3/2024.    Specialty: Family Medicine  Why: at 1:15pm for hospital follow up.  Needs follow up cbc/bmp  Contact information:  [address]  [facility] [address] [phone]               Rakesh  CHARISSE Osborne. Go on 10/15/2024.    Specialty: Cardiology  Why: at 1:15pm for hospital follow up of atrial fibrillation/chf.  Contact information:  [place] [address] [phone]                              TEST  RESULTS PENDING AT DISCHARGE       CODE STATUS  Code Status and Medical Interventions: CPR (Attempt to Resuscitate); Full Support   Ordered at: 09/24/24 1457     Code Status (Patient has no pulse and is not breathing):    CPR (Attempt to Resuscitate)     Medical Interventions (Patient has pulse or is breathing):    Full Support       Adams Hillman MD  [facility]  10/01/24  11:34 EDT    Please note that this discharge summary required less than 30 minutes to complete.

## 2024-10-01 NOTE — PROGRESS NOTES
Occupational Therapy:    Physical Therapy: Individual: 15 minutes.    Speech Language Pathology:    Signed by: India Romero PTA

## 2024-10-01 NOTE — THERAPY DISCHARGE NOTE
Inpatient Rehabilitation - IRF Occupational Therapy Treatment Note/Discharge   Thom     Patient Name: Anshul Andrews  : 1955  MRN: [id number]  Today's Date: 10/1/2024               Admit Date: 2024     No diagnosis found.  Patient Active Problem List   Diagnosis    CVA (cerebral vascular accident)     History reviewed. No pertinent past medical history.  History reviewed. No pertinent surgical history.    IRF OT ASSESSMENT FLOWSHEET (Last 12 Hours)       IRF OT Evaluation and Treatment       Row Name 10/01/24 1116          OT Time and Intention    Document Type discharge evaluation  -TM     Mode of Treatment occupational therapy  -TM     Symptoms Noted During/After Treatment none  -TM       Row Name 10/01/24 1116          General Information    General Observations of Patient Pt's son present for pt/family training/education with recommended 24 hour assistance and supervision with pt/son verb understanding. Education provided regarding ADL status, fxal mobility, precautions, home safety and DME with pt/son verb understanding.  -TM     Existing Precautions/Restrictions fall  -TM       Row Name 10/01/24 1116          Cognition/Psychosocial    Orientation Status (Cognition) oriented x 4  -TM     Follows Commands (Cognition) WFL  -TM       Row Name 10/01/24 1116          Bathing    Comment (Bathing) supervision  -TM       Row Name 10/01/24 1116          Upper Body Dressing    Comment (Upper Body Dressing) setup  -TM       Row Name 10/01/24 1116          Lower Body Dressing    Comment (Lower Body Dressing) supervision  -TM       Row Name 10/01/24 1116          Grooming    Comment (Grooming) setup  -TM       Row Name 10/01/24 1116          Toileting    Comment (Toileting) supervision  -TM       Row Name 10/01/24 1116          Self-Feeding    [place] Level (Self-Feeding) modified independence  -TM       Row Name 10/01/24 1116          Toilet Transfer    [place] Level (Toilet Transfer)  supervision;modified independence  -       Row Name 10/01/24 1116          LB Dressing Goal 1 (OT-IRF)    Progress/Outcomes (LB Dressing Goal 1, OT-IRF) goal met  -TM       Row Name 10/01/24 1116          LB Dressing Goal 2 (OT-IRF)    Progress/Outcomes (LB Dressing Goal 2, OT-IRF) goal met  -       Row Name 10/01/24 1116          Toileting Goal 1 (OT-IRF)    Progress/Outcomes (Toileting Goal 1, OT-IRF) goal met  -TM       Row Name 10/01/24 1116          Toileting Goal 2 (OT-IRF)    Progress/Outcomes (Toileting Goal 2, OT-IRF) goal met  -               User Key  (r) = Recorded By, (t) = Taken By, (c) = Cosigned By      Initials Name Effective Dates     Melisa Alaniz, OT 06/16/21 -                        Occupational Therapy Education       Title: PT OT SLP Therapies (Resolved)       Topic: Occupational Therapy (Resolved)       Point: ADL training (Resolved)       Description:   Instruct learner(s) on proper safety adaptation and remediation techniques during self care or transfers.   Instruct in proper use of assistive devices.                  Learning Progress Summary             Patient Acceptance, E,D, VU,NR by TM at 10/1/2024 1129    Acceptance, E,D, VU,NR by  at 9/30/2024 0829    Acceptance, E, NR by BC at 9/28/2024 1542    Acceptance, E,D, VU,NR by  at 9/27/2024 1111    Acceptance, E,D, VU,NR by  at 9/26/2024 1314    Acceptance, E,D, VU,NR by  at 9/25/2024 1018   Family Acceptance, E,D, VU,NR by  at 10/1/2024 1129                         Point: Precautions (Resolved)       Description:   Instruct learner(s) on prescribed precautions during self-care and functional transfers.                  Learning Progress Summary             Patient Acceptance, E,D, VU,NR by  at 10/1/2024 1129    Acceptance, E,D, VU,NR by  at 9/30/2024 0829    Acceptance, E, NR by BC at 9/28/2024 1542    Acceptance, E,D, VU,NR by  at 9/27/2024 1111    Acceptance, E,D, VU,NR by  at 9/26/2024 8703     Acceptance, E,D, VU,NR by TM at 9/25/2024 1018   Family Acceptance, E,D, VU,NR by TM at 10/1/2024 1129                                         User Key       Initials Effective Dates Name Provider Type Discipline     06/16/21 -  Melisa Alaniz, OT Occupational Therapist OT    BC 06/16/21 -  Siri Beth OT Occupational Therapist OT                    OT Recommendation and Plan  Planned Therapy Interventions (OT): activity tolerance training, adaptive equipment training, BADL retraining, IADL retraining, neuromuscular control/coordination retraining, occupation/activity based interventions, patient/caregiver education/training, ROM/therapeutic exercise, strengthening exercise, transfer/mobility retraining           OT IRF GOALS       Row Name 10/01/24 1116 09/25/24 0821          LB Dressing Goal 1 (OT-IRF)    [place] (LB Dressing Goal 1, OT-IRF) -- contact guard required  -TM     Time Frame (LB Dressing Goal 1, OT-IRF) -- short-term goal (STG)  -TM     Progress/Outcomes (LB Dressing Goal 1, OT-IRF) goal met  -TM --        LB Dressing Goal 2 (OT-IRF)    [place] (LB Dressing Goal 2, OT-IRF) -- supervision required  -TM     Time Frame (LB Dressing Goal 2, OT-IRF) -- long-term goal (LTG);by discharge  -TM     Progress/Outcomes (LB Dressing Goal 2, OT-IRF) goal met  -TM --        Toileting Goal 1 (OT-IRF)    [place] Level (Toileting Goal 1, OT-IRF) -- contact guard required  -TM     Progress/Outcomes (Toileting Goal 1, OT-IRF) goal met  -TM --     Time Frame (Toileting Goal 1, OT-IRF) -- short-term goal (STG)  -TM        Toileting Goal 2 (OT-IRF)    [place] Level (Toileting Goal 2, OT-IRF) -- supervision required  -TM     Progress/Outcomes (Toileting Goal 2, OT-IRF) goal met  -TM --     Time Frame (Toileting Goal 2, OT-IRF) -- long-term goal (LTG);by discharge  -TM               User Key  (r) = Recorded By, (t) = Taken By, (c) = Cosigned By      Initials Name Provider Type    TM Katty  Melisa Gotti OT Occupational Therapist                        Time Calculation:    Time Calculation- OT       Row Name 10/01/24 1100             Time Calculation- OT    OT Start Time 1045  -TM      OT Stop Time 1055  -TM      OT Time Calculation (min) 10 min  -TM      Total Timed Code Minutes- OT 10 minute(s)  -TM      OT Non-Billable Time (min) 30 min  -TM                User Key  (r) = Recorded By, (t) = Taken By, (c) = Cosigned By      Initials Name Provider Type     Melisa Alaniz OT Occupational Therapist                    Therapy Charges for Today       Code Description Service Date Service Provider Modifiers Qty    88300534942 HC OT SELF CARE/MGMT/TRAIN EA 15 MIN 9/30/2024 Melisa Alaniz OT GO 2    20324041671 HC OT THERAPEUTIC ACT EA 15 MIN 9/30/2024 Melisa Alaniz OT GO 1    69979953526 HC OT THER PROC EA 15 MIN 9/30/2024 Melisa Alaniz OT GO 3    08178356976 HC OT SELF CARE/MGMT/TRAIN EA 15 MIN 10/1/2024 Melisa Alaniz OT GO 1                 OT Discharge Summary  Reason for Discharge: Discharge from facility  Outcomes Achieved: Refer to plan of care for updates on goals achieved  Discharge Destination: Home with assist    Melisa Alaniz OT  10/1/2024

## 2024-10-01 NOTE — PAYOR COMM NOTE
"Kaylene Morrison, RN  Utilization Review Nurse for Inpatient Rehab  Phone: [phone]  Fax: [phone]  Email: [email or web address]  [facility] NPI: [id number]    DISCHARGE NOTIFICATION WITH DISCHARGE SUMMARY  Member: Anshul Andrews  : 1955  REFERENCE# [id number]  ID# [id number]  Admission Date: 24  Discharge Date:  10/01/24  Disposition:  Home with family; home exercise programs from PT/OT.    Anshul Andrews (69 y.o. Male)       Date of Birth   1955    Social Security Number       Address   [address] [place] [address]    Home Phone   [phone]    MRN   [id number]       Jain   Unknown    Marital Status                               Admission Date   24    Admission Type   Elective    Admitting Provider   Valeriy Brandon MD    Attending Provider       Department, Room/Bed   [facility], 104/2S       Discharge Date   10/1/2024    Discharge Disposition   Home or Self Care    Discharge Destination                                 Attending Provider: (none)   Allergies: No Known Allergies    Isolation: None   Infection: None   Code Status: CPR    Ht: 198.1 cm (78\")   Wt: 116 kg (255 lb)    Admission Cmt: None   Principal Problem: CVA (cerebral vascular accident) [I63.9]                   [facility] Encounter Date/Time: 2024 [facility]   Hospital Account: [id number]    MRN: [id number]   Patient:  Anshul Andrews   Contact Serial #: [id number]   SSN:          ENCOUNTER             Patient Class: Rehab IP   Unit: [facility] Service: Physical Medicine and Rehabilitation     Bed: 104/2S   Admitting Provider: Valeriy Brandon MD   Referring Physician: Provider, No Known   Attending Provider:     Adm Diagnosis: CVA (cerebral vascular a*      PATIENT                 Name: Anshul Andrews : 1955 (69 yrs)   Address: [address] [place] [address] Sex: Male   City: [place] [address]     Marital Status:          Ethnicity: NOT "                Race: WHITE   Primary Care Provider: Rosaura Guzman AP*         Primary Phone: [phone]   EMERGENCY CONTACT   Contact Name Legal Guardian? Relationship to Patient Home Phone Work Phone   1. Yogesh Webb  2. *No Contact Specified*      Son                      GUARANTOR            Guarantor: Anshul Webb     : 1955   Address: [address] [place] [address] Sex: [place] [address]     Relation to Patient: Self       Home Phone: [phone]   Guarantor ID: [id number]       Work Phone:     GUARANTOR EMPLOYER   Employer:           Status: UNKNOWN      COVERAGE          PRIMARY INSURANCE   Payor: [facility] [place]ICARE REPLACEMENT Plan: [facility] MCARE REPLACEMENT   Group Number: [id number] Insurance Type: INDEMNITY   Subscriber Name: ANSHUL WEBB Subscriber : 1955   Subscriber ID: [id number] Coverage Address: [facility] [place] [address]   Pre-Certification #: Pre-cert number: N/A Authorization #:     Pat. Rel. to Subscriber: Self Coverage Phone: [phone]         Heike Yoon MSW     Case Management  Significant Note      Signed  Date of Service:  10/01/24 1103  Creation Time:  10/01/24 1103     Signed             10/01/24 1100   Plan   Plan Spoke to son and pt.  Explained PT/OT recommended home exercise programs and he did not need DME.  Discussed getting insurance plan switched to a [place] provider if he plans to move to this state.  Informed him about Medicare open enrollment starting the middle of this month until the first week of December.   Son will provide transportation.   Pt is going home with cathi Zuñiga.  No other needs voiced at this time.   Patient/Family in Agreement with Plan yes   Final Discharge Disposition Code 01 - home or self-care                   Heike Yoon MSW     Case Management  Significant Note      Signed  Date of Service:  24 153  Creation Time:  24      Signed             24 1145   Plan   Plan Spoke to pt about recommendations for PT/OT to provide home exercise programs at discharge.  DME is not recommended.  Pt has informed son about discharge on 10-1-24 and will tell him to come to rehab around 11:00 am for discharge and transport home.  MD completed paperwork for short-term disability.  SS faxed paperwork to [employer] [phone].  Pt will go to son's home at discharge.                   Heike Yoon MSW     Case Management  Significant Note      Signed  Date of Service:  24  Creation Time:  24     Signed             24 0810   Plan   Plan Pt informed SS he will be discharged home on 10-1-24.  MD confirmed discharge plan.  Pt has some short-term disability paperwork for MD to complete.                       Adams Hillman MD   Physician  Hospitalist     Discharge Summary      Signed     Date of Service: 10/01/24 1104  Creation Time: 10/01/24 113     Signed              [facility] HOSPITALISTS DISCHARGE SUMMARY     Patient Identification:  Name:  Anshul Andrews  Age:  69 y.o.  Sex:  male  :  1955  MRN:  [id number]  Visit Number:  [id number]     Date of Admission: 2024  Date of Discharge:  10/1/2024      PCP: Rosaura Guzman APRN     DISCHARGE DIAGNOSIS  Status post right MCA distribution CVA--status post TNK and subsequent thrombectomy     Atrial fibrillation, paroxysmal  Diabetes mellitus  Hypertension  CHF with reduced EF  Polycythemia  History of cardiac thrombi  Tobacco use     CONSULTS         PROCEDURES PERFORMED        HOSPITAL COURSE  Patient is a 69 y.o. male presented to [facility] acute inpatient physical rehab in transfer from [facility].  Patient is states that he is a  by living and states that that he developed sudden left facial droop and was unable to speak and had left-sided weakness and was taken  immediately to the hospital.  Patient reportedly got TNK and subsequent thrombectomy.  Patient was initially almost flaccid on the left side but afterwards by the next day after treatment had almost complete resolution of symptoms.  Patient was noted to have left atrial thrombus and was started on Eliquis therapy.  Afterwards it was thought patient would be a good candidate for inpatient physical rehab.     Status post right MCA distribution CVA--have continued patient on Eliquis throughout the admission without difficulty.  Patient is also on aspirin and Lipitor therapy..  At the time of admission, patient required minimum assistance with bathing; set up for upper body dressing; minimum assist with lower body dressing; set up with grooming; contact-guard to minimum assist for toileting.  At the time of admission was independent for bed mobility; standby assist required for transfers; able to ambulate 300 feet with no assistive device and standby assistance.  Was evaluated by speech therapy at the time of admission and was swallowing without difficulty and no formal speech therapy was required.  Patient speech also within normal limits.  At the time of discharge patient requiring supervision for bathing; set up for upper body dressing; set up for her supervision for lower body dressing; set up for grooming; supervision for toileting.  Modified independent for transfers.  Ambulating 320 feet with supervision.  Does have a slight antalgic gait secondary to arthritic left knee.  I did discuss with patient and at this time patient should be able to operate motor vehicle.     Atrial fibrillation, paroxysmal--patient has been in sinus rhythm during the admission.  We have continued patient on Eliquis.  Patient also on metoprolol 50 mg twice daily     CHF with reduced EF patient is compensated and has been throughout the admission we have continued patient on Jardiance 10 mg daily, metoprolol, Aldactone.  Patient would  likely benefit from outpatient evaluation of CHF is his diagnosis of reduced EF was apparently new while at [facility].  Patient may benefit from further cardiology workup.     Diabetes mellitus--hemoglobin A1c is 6.2.  Will continue Jardiance at this time patient blood sugars been well-controlled throughout the admission     Tobacco abuse recommend cessation     Polycythemia last hemoglobin was 17.  May improve with smoking cessation        VITAL SIGNS:  Temp:  [97.7 °F (36.5 °C)-97.9 °F (36.6 °C)] 97.9 °F (36.6 °C)  Heart Rate:  [61-64] 64  Resp:  [18] 18  BP: (110-140)/(64-82) 130/78  SpO2:  [93 %-97 %] 97 %  on   ;   Device (Oxygen Therapy): room air     Body mass index is 29.47 kg/m².      Wt Readings from Last 3 Encounters:   09/24/24 116 kg (255 lb)         PHYSICAL EXAM:  Constitutional: No acute distress  HEENT: Normocephalic atraumatic  Neck:   Supple  Cardiovascular: Regular rate and rhythm  Pulmonary/Chest: Clear  Abdominal: Positive bowel sounds soft.   Musculoskeletal: Does have some arthritic changes in the left knee  Neurological: No focal deficits at this point  Skin: No rash  Peripheral vascular: No edema  Genitourinary::     DISCHARGE DISPOSITION   Stable     DISCHARGE MEDICATIONS:      Discharge Medications          New Medications         Instructions Start Date   apixaban 5 MG tablet tablet  Commonly known as: ELIQUIS    5 mg, Oral, Every 12 Hours Scheduled        aspirin 81 MG EC tablet    81 mg, Oral, Daily    Start Date: October 2, 2024      atorvastatin 40 MG tablet  Commonly known as: LIPITOR    40 mg, Oral, Daily    Start Date: October 2, 2024      cyanocobalamin 1000 MCG tablet  Commonly known as: VITAMIN B-12    1,000 mcg, Oral, Daily    Start Date: October 2, 2024      empagliflozin 10 MG tablet tablet  Commonly known as: JARDIANCE    10 mg, Oral, Daily    Start Date: October 2, 2024      metoprolol tartrate 50 MG tablet  Commonly known as: LOPRESSOR    50 mg, Oral, Every  12 Hours Scheduled        spironolactone 25 MG tablet  Commonly known as: ALDACTONE    12.5 mg, Oral, Daily    Start Date: October 2, 2024                    Your medication list          START taking these medications         Instructions Last Dose Given Next Dose Due   apixaban 5 MG tablet tablet  Commonly known as: ELIQUIS       Take 1 tablet by mouth Every 12 (Twelve) Hours. Indications: Atrial Fibrillation          aspirin 81 MG EC tablet  Start taking on: October 2, 2024       Take 1 tablet by mouth Daily for 30 days.          atorvastatin 40 MG tablet  Commonly known as: LIPITOR  Start taking on: October 2, 2024       Take 1 tablet by mouth Daily for 90 days.          cyanocobalamin 1000 MCG tablet  Commonly known as: VITAMIN B-12  Start taking on: October 2, 2024       Take 1 tablet by mouth Daily for 30 days.          empagliflozin 10 MG tablet tablet  Commonly known as: JARDIANCE  Start taking on: October 2, 2024       Take 1 tablet by mouth Daily for 30 days.          metoprolol tartrate 50 MG tablet  Commonly known as: LOPRESSOR       Take 1 tablet by mouth Every 12 (Twelve) Hours for 30 days.          spironolactone 25 MG tablet  Commonly known as: ALDACTONE  Start taking on: October 2, 2024       Take 0.5 tablets by mouth Daily for 30 days.                        Where to Get Your Medications          These medications were sent to [facility] [address] [place] [address] [phone] [place] [phone] [address] [address]        Phone: [phone]   apixaban 5 MG tablet tablet  aspirin 81 MG EC tablet  atorvastatin 40 MG tablet  cyanocobalamin 1000 MCG tablet  empagliflozin 10 MG tablet tablet  metoprolol tartrate 50 MG tablet  spironolactone 25 MG tablet            Diet Instructions         Diet: Diabetic Diets; Consistent Carbohydrate; Regular (IDDSI 7); Thin (IDDSI 0)       Discharge Diet: Diabetic Diets     Diabetic Diet: Consistent Carbohydrate      Texture: Regular (IDDSI 7)     Fluid Consistency: Thin (IDDSI 0)             Activity Instructions         Activity as Tolerated                      Future Appointments   Date Time Provider Department Center   10/3/2024  1:15 PM Rosaura Rodriguez APRN [facility]   10/15/2024  1:15 PM India Cameron APRN [facility]      Additional Instructions for the Follow-ups that You Need to Schedule         Discharge Follow-up with PCP   As directed         Currently Documented PCP:    Provider, No Known    PCP Phone Number:    [phone]      Follow Up Details: rosaura rodriguez on 10/3(keep appt)---needs followup cbc/bmp           [facility]   As directed        To: [facility] cardiology   Follow Up: 2 Weeks   Follow Up Details: followup atrial fibrillaiton/chf                     Follow-up Information         Rosaura Rodriguez APRN. Go on 10/3/2024.    Specialty: Family Medicine  Why: at 1:15pm for hospital follow up.  Needs follow up cbc/bmp  Contact information:  [address] [place] [address]  [phone]                    India Cameron APRN. Go on 10/15/2024.    Specialty: Cardiology  Why: at 1:15pm for hospital follow up of atrial fibrillation/chf.  Contact information:   [place] [address]  [phone]                                      TEST  RESULTS PENDING AT DISCHARGE        CODE STATUS      Code Status and Medical Interventions: CPR (Attempt to Resuscitate); Full Support   Ordered at: 09/24/24 1457     Code Status (Patient has no pulse and is not breathing):     CPR (Attempt to Resuscitate)     Medical Interventions (Patient has pulse or is breathing):     Full Support         Adams Hillman MD  [facility] Hospitalist  10/01/24  11:34 EDT     Please note that this discharge summary required less than 30 minutes to complete.

## 2024-10-01 NOTE — PROGRESS NOTES
Patient Assessment Instrument  Quality Indicators - Discharge FY 2023    Section A. Transportation      Section A. Medication List        Section B. Health Literacy      Section C. BIMS      Section C. Signs and Symptoms of Delirium (from CAM)      Section D. Mood      Section D. Social Isolation      Section GG. Self-Care Performance     Eating: Patient completed the activities by themself with no assistance from a  helper.   Oral Hygiene: [place] sets up or cleans up; patient completes activity. [place]  assists only prior to or following the activity.   Toileting Hygiene: : [place] provides verbal cues and/or touching/steadying  and/or contact guard assistance as patient completes activity.   Shower/Bathe Self: [place] provides verbal cues and/or touching/steadying and/or  contact guard assistance as patient completes activity.   Upper Body Dressing: [place] sets up or cleans up; patient completes activity.  [place] assists only prior to or following the activity.   Lower Body Dressing: [place] provides verbal cues and/or touching/steadying  and/or contact guard assistance as patient completes activity.   Putting On/Taking Off Footwear: [place] provides verbal cues and/or  touching/steadying and/or contact guard assistance as patient completes  activity.    Section GG. Mobility Performance      Section J. Health Conditions Discharge      Section J. Health Conditions (Pain)      Section K. Swallowing/Nutritional Status  Nutritional Approaches Past 7 Days:  Nutritional Approaches at Discharge:    Section M. Skin Conditions Discharge      . Current Number of Unhealed Pressure Ulcers      Section N. Medication        Section O. Special Treatments, Procedures, and Programs    Signed by: Melisa Alaniz OT

## 2024-10-02 ENCOUNTER — TRANSITIONAL CARE MANAGEMENT TELEPHONE ENCOUNTER (OUTPATIENT)
Dept: CALL CENTER | Facility: HOSPITAL | Age: 69
End: 2024-10-02
Payer: MEDICARE

## 2024-10-02 ENCOUNTER — READMISSION MANAGEMENT (OUTPATIENT)
Dept: CALL CENTER | Facility: HOSPITAL | Age: 69
End: 2024-10-02
Payer: MEDICARE

## 2024-10-02 NOTE — OUTREACH NOTE
Prep Survey      Flowsheet Row Responses   [facility] patient discharged from? [facility]   Is LACE score < 7 ? No   Eligibility [facility]  Rehab unit   Date of Admission 09/24/24   Date of Discharge 10/01/24   Discharge Disposition Home or Self Care   Discharge diagnosis Status post right MCA distribution CVA--status post TNK and subsequent thrombectomy   Does the patient have one of the following disease processes/diagnoses(primary or secondary)? Stroke   Does the patient have Home health ordered? No   Is there a DME ordered? No   Prep survey completed? Yes            Rosibel GOOD - Registered Nurse

## 2024-10-02 NOTE — PROGRESS NOTES
Patient Assessment Instrument  Quality Indicators - Discharge FY 2023    Section A. Transportation  Issues Due to Lack of Transportation:  No    Section A. Medication List        Section B. Health Literacy      Section C. BIMS      Section C. Signs and Symptoms of Delirium (from CAM)      Section D. Mood      Section D. Social Isolation      Section GG. Self-Care Performance      Section GG. Mobility Performance      Section J. Health Conditions Discharge  Fall(s) Since Admission:  No    Section J. Health Conditions (Pain)      Section K. Swallowing/Nutritional Status  Nutritional Approaches Past 7 Days:  Therapeutic diet (e.g., low salt, diabetic,  low cholesterol)  Nutritional Approaches at Discharge:  Therapeutic diet (e.g., low salt,  diabetic, low cholesterol)    Section M. Skin Conditions Discharge      . Current Number of Unhealed Pressure Ulcers      Section N. Medication    Medication Intervention: Not applicable - There were no potential clinically  significant medication issues identified since admission or patient is not  taking any medications.  Patient is taking medications in the following pharmacological classification:  E. Anticoagulant An indication is noted for all medications in the Anticoagulant  drug class. I. Antiplatelet An indication is noted for all medications in the  Antiplatelet drug class. J. Hypoglycemic (including insulin) An indication is  noted for all medications in the Hypoglycemic drug class.    Section O. Special Treatments, Procedures, and Programs  None    Signed by: Kaylene Morrison, Supervisor

## 2024-10-02 NOTE — OUTREACH NOTE
Call Center TCM Note      Flowsheet Row Responses   [facility] facility [facility] discharged from? [facility]   Does the patient have one of the following disease processes/diagnoses(primary or secondary)? Stroke   TCM attempt successful? No   Unsuccessful attempts Attempt 2            Danielle Friedman RN    10/2/2024, 15:59 EDT

## 2024-10-02 NOTE — OUTREACH NOTE
Call Center TCM Note      Flowsheet Row Responses   [facility] facility patient discharged from? [facility]   Does the patient have one of the following disease processes/diagnoses(primary or secondary)? Stroke   TCM attempt successful? No  [no vr]   Unsuccessful attempts Attempt 1   Call Status Voice mail issues            Danielle Friedman RN    10/2/2024, 10:05 EDT

## 2024-10-03 ENCOUNTER — OFFICE VISIT (OUTPATIENT)
Dept: FAMILY MEDICINE CLINIC | Facility: CLINIC | Age: 69
End: 2024-10-03
Payer: MEDICARE

## 2024-10-03 ENCOUNTER — TRANSITIONAL CARE MANAGEMENT TELEPHONE ENCOUNTER (OUTPATIENT)
Dept: CALL CENTER | Facility: HOSPITAL | Age: 69
End: 2024-10-03
Payer: MEDICARE

## 2024-10-03 VITALS
DIASTOLIC BLOOD PRESSURE: 68 MMHG | RESPIRATION RATE: 16 BRPM | WEIGHT: 256 LBS | BODY MASS INDEX: 29.62 KG/M2 | SYSTOLIC BLOOD PRESSURE: 140 MMHG | HEART RATE: 56 BPM | HEIGHT: 78 IN | TEMPERATURE: 96.4 F | OXYGEN SATURATION: 95 %

## 2024-10-03 DIAGNOSIS — Z12.11 SCREEN FOR COLON CANCER: ICD-10-CM

## 2024-10-03 DIAGNOSIS — J41.0 SIMPLE CHRONIC BRONCHITIS: Chronic | ICD-10-CM

## 2024-10-03 DIAGNOSIS — I63.412 CEREBROVASCULAR ACCIDENT (CVA) DUE TO EMBOLISM OF LEFT MIDDLE CEREBRAL ARTERY: Primary | Chronic | ICD-10-CM

## 2024-10-03 DIAGNOSIS — R73.03 PREDIABETES: Chronic | ICD-10-CM

## 2024-10-03 DIAGNOSIS — Z23 NEED FOR INFLUENZA VACCINATION: ICD-10-CM

## 2024-10-03 DIAGNOSIS — I50.42 CHRONIC COMBINED SYSTOLIC AND DIASTOLIC CONGESTIVE HEART FAILURE: Chronic | ICD-10-CM

## 2024-10-03 DIAGNOSIS — Z13.6 SCREENING FOR AAA (ABDOMINAL AORTIC ANEURYSM): ICD-10-CM

## 2024-10-03 DIAGNOSIS — I48.19 PERSISTENT ATRIAL FIBRILLATION: Chronic | ICD-10-CM

## 2024-10-03 PROBLEM — I49.9 CARDIAC ARRHYTHMIA: Chronic | Status: ACTIVE | Noted: 2024-10-03

## 2024-10-03 PROCEDURE — G0008 ADMIN INFLUENZA VIRUS VAC: HCPCS | Performed by: NURSE PRACTITIONER

## 2024-10-03 PROCEDURE — 99204 OFFICE O/P NEW MOD 45 MIN: CPT | Performed by: NURSE PRACTITIONER

## 2024-10-03 PROCEDURE — 1160F RVW MEDS BY RX/DR IN RCRD: CPT | Performed by: NURSE PRACTITIONER

## 2024-10-03 PROCEDURE — 90662 IIV NO PRSV INCREASED AG IM: CPT | Performed by: NURSE PRACTITIONER

## 2024-10-03 PROCEDURE — 1159F MED LIST DOCD IN RCRD: CPT | Performed by: NURSE PRACTITIONER

## 2024-10-03 RX ORDER — ALBUTEROL SULFATE 90 UG/1
2 INHALANT RESPIRATORY (INHALATION) EVERY 4 HOURS PRN
Qty: 8 G | Refills: 2 | Status: SHIPPED | OUTPATIENT
Start: 2024-10-03

## 2024-10-03 RX ORDER — FLUTICASONE PROPIONATE AND SALMETEROL 250; 50 UG/1; UG/1
1 POWDER RESPIRATORY (INHALATION)
Qty: 60 EACH | Refills: 5 | Status: SHIPPED | OUTPATIENT
Start: 2024-10-03

## 2024-10-03 NOTE — PROGRESS NOTES
PPS CMG Coordinator  Inpatient Rehabilitation Discharge    Mode of Locomotion: Walking.    Discharge Against Medical Advice:  No.  Discharge Information  Patient Discharged Alive:  Yes  Discharge Destination/Living Setting: Home.  At discharge, the patient was discharged to live (with) (01)  Alone  Diagnosis for Interruption/Death:    Impairment Group: Stroke: 01.1 Left Body Involvement (Right Brain)    Comorbidities: Rank Code      Description      1    I50.20    Unspecified systolic (congestive) heart failure  2    I11.0     Hypertensive heart disease with heart failure  3    I69.354   Hemiplegia and hemiparesis following cerebral                 infarction affecting left non-dominant side  4    E11.9     Type 2 diabetes mellitus without complications  5    I48.0     Paroxysmal atrial fibrillation  6    F17.200   Nicotine dependence, unspecified, uncomplicated  7    I51.3     Intracardiac thrombosis, not elsewhere                 classified  8    Z79.01    Long term (current) use of anticoagulants  9    I69.392   Facial weakness following cerebral infarction    Complications:      IRISH Bladder Accidents:   - Accidents.    IRISH Bowel Accident:   - Accidents.    Signed by: Ernestine Zamora Nurse

## 2024-10-03 NOTE — OUTREACH NOTE
Call Center TCM Note      Flowsheet Row Responses   [facility] facility patient discharged from? [facility]   Does the patient have one of the following disease processes/diagnoses(primary or secondary)? Stroke   TCM attempt successful? No   Unsuccessful attempts Attempt 3            Benjie Lyons RN    10/3/2024, 11:22 EDT

## 2024-10-03 NOTE — PROGRESS NOTES
"Mary Andrews is a 69 y.o. male.     Chief Complaint   Patient presents with    Hospital Follow Up Visit    Atrial Fibrillation    COPD    Congestive Heart Failure    Cerebrovascular Accident       History of Present Illness  He presents seeking a new primary care provider for hospital follow up of CVA. He has not had a primary care provider for years. He was working was walking and fell. He was glassy eyed. They took him to the hospital in [place] where he was for a couple weeks and then was transferred to [place] for rehab. He received TKA and was started on eliquis. He was found to have afib, chf with reduced ejection fraction, prediabetes with A1C 6.2. He states he still feels a little weak but other than that, he's pretty good. Discharge summary reviewed. He reports good compliance with eliquis, statin, jardiance, metoprolol and spironolactone. Discharge summary reviewed.        The following portions of the patient's history were reviewed and updated as appropriate: allergies, current medications, past family history, past medical history, past social history, past surgical history and problem list.    Review of Systems   Constitutional:  Negative for fever.   Respiratory:  Negative for cough, shortness of breath and wheezing.    Cardiovascular:  Negative for chest pain and palpitations.   Gastrointestinal:  Negative for abdominal pain, blood in stool, diarrhea, nausea and vomiting.   Genitourinary:  Negative for dysuria and hematuria.   Neurological:  Negative for dizziness, speech difficulty and headaches.   Psychiatric/Behavioral:  Negative for sleep disturbance. The patient is not nervous/anxious.        Objective     /68 (BP Location: Right arm, Patient Position: Sitting, Cuff Size: Large Adult)   Pulse 56   Temp 96.4 °F (35.8 °C) (Oral)   Resp 16   Ht 198.1 cm (77.99\")   Wt 116 kg (256 lb)   SpO2 95%   BMI 29.59 kg/m²     Physical Exam  Vitals reviewed.   Constitutional:       " General: He is not in acute distress.     Appearance: He is well-developed. He is not diaphoretic.   HENT:      Head: Normocephalic and atraumatic.   Cardiovascular:      Rate and Rhythm: Bradycardia present. Rhythm irregularly irregular.      Heart sounds: Normal heart sounds. No murmur heard.     No friction rub. No gallop.   Pulmonary:      Effort: Pulmonary effort is normal. No respiratory distress.      Breath sounds: Examination of the right-upper field reveals wheezing and rales. Examination of the left-upper field reveals wheezing and rales.   Abdominal:      General: Bowel sounds are normal. There is no distension.      Palpations: Abdomen is soft.      Tenderness: There is no abdominal tenderness.   Skin:     General: Skin is warm and dry.   Neurological:      Mental Status: He is alert and oriented to person, place, and time.   Psychiatric:         Behavior: Behavior normal.         Thought Content: Thought content normal.         Judgment: Judgment normal.         Current Outpatient Medications   Medication Sig Dispense Refill    apixaban (ELIQUIS) 5 MG tablet tablet Take 1 tablet by mouth Every 12 (Twelve) Hours. Indications: Atrial Fibrillation 60 tablet 0    aspirin 81 MG EC tablet Take 1 tablet by mouth Daily for 30 days. 30 tablet 0    atorvastatin (LIPITOR) 40 MG tablet Take 1 tablet by mouth Daily for 90 days. 90 tablet 0    empagliflozin (JARDIANCE) 10 MG tablet tablet Take 1 tablet by mouth Daily for 30 days. 30 tablet 0    metoprolol tartrate (LOPRESSOR) 50 MG tablet Take 1 tablet by mouth Every 12 (Twelve) Hours for 30 days. 60 tablet 0    spironolactone (ALDACTONE) 25 MG tablet Take 0.5 tablets by mouth Daily for 30 days. 15 tablet 0    vitamin B-12 (VITAMIN B-12) 1000 MCG tablet Take 1 tablet by mouth Daily for 30 days. 30 tablet 0    albuterol sulfate  (90 Base) MCG/ACT inhaler Inhale 2 puffs Every 4 (Four) Hours As Needed for Wheezing. 8 g 2    Fluticasone-Salmeterol (Wixela Inhub)  250-50 MCG/ACT DISKUS Inhale 1 puff 2 (Two) Times a Day. 60 each 5     No current facility-administered medications for this visit.            Assessment & Plan     Problem List Items Addressed This Visit       CVA (cerebral vascular accident) - Primary    Cardiac arrhythmia (Chronic)    Simple chronic bronchitis (Chronic)    Relevant Medications    albuterol sulfate  (90 Base) MCG/ACT inhaler    Fluticasone-Salmeterol (Wixela Inhub) 250-50 MCG/ACT DISKUS    Prediabetes (Chronic)     Other Visit Diagnoses       Chronic combined systolic and diastolic congestive heart failure  (Chronic)       Relevant Orders    Ambulatory Referral to Heart Failure Clinic    Need for influenza vaccination        Relevant Orders    Fluzone High-Dose 65+yrs (0255-3006) (Completed)    Screening for AAA (abdominal aortic aneurysm)        Relevant Orders    [place] aaa screen limited    Screen for colon cancer        Relevant Orders    Cologuard - Stool, Per Rectum              ICD-10-CM ICD-9-CM   1. Cerebrovascular accident (CVA) due to embolism of left middle cerebral artery  I63.412 434.11   2. Chronic combined systolic and diastolic congestive heart failure  I50.42 428.42     428.0   3. Simple chronic bronchitis  J41.0 491.0   4. Need for influenza vaccination  Z23 V04.81   5. Persistent atrial fibrillation  I48.19 427.31   6. Prediabetes  R73.03 790.29   7. Screening for AAA (abdominal aortic aneurysm)  Z13.6 V81.2   8. Screen for colon cancer  Z12.11 V76.51       Plan: Continue current meds. Get cologuard and aaa screen. He declines pneumonia vaccine today. Flu vaccine ordered with his consent. Follow up in one month and as needed.    @Body mass index is 29.59 kg/m².              Understands disease processes and need for medications.  Understands reasons for urgent and emergent care.  Patient (& family) verbalized agreement for treatment plan.   Emotional support and active listening provided.  Patient provided time to verbalize  feelings.           BMI is >= 25 and <30. (Overweight) The following options were offered after discussion;: weight loss educational material (shared in after visit summary)      This document has been electronically signed by CHARISSE Trujillo   October 3, 2024 15:10 EDT

## 2024-10-03 NOTE — PROGRESS NOTES
Case Management  Inpatient Rehabilitation Team Conference    Conference Date/Time: 10/1/2024 6:21:31 AM    Team Conference Attendees:  MD Yeimi Pearce SW Jessica Bill, RN,   TIFFANIE Mchugh, PT  Melisa Alaniz OT    Demographics            Age: 69Y            Gender: Male    Admission Date: 9/24/2024 2:44:00 PM  Rehabilitation Diagnosis:  CVA  Comorbidities: I50.20 Unspecified systolic (congestive) heart failure  I11.0 Hypertensive heart disease with heart failure  I69.354 Hemiplegia and hemiparesis following cerebral infarction affecting left  non-dominant side  E11.9 Type 2 diabetes mellitus without complications  I48.0 Paroxysmal atrial fibrillation  F17.200 Nicotine dependence, unspecified, uncomplicated  I51.3 Intracardiac thrombosis, not elsewhere classified  Z79.01 Long term (current) use of anticoagulants  I69.392 Facial weakness following cerebral infarction      Plan of Care  Anticipated Discharge Date/Estimated Length of Stay: 10-2-24  Anticipated Discharge Destination: Community discharge with assistance  Discharge Plan : Pt plans to go home with son at discharge who lives in  [place], [place].  Medical Necessity Expected Level Rationale: good  Intensity and Duration: an average of 3 hours/5 days per week  Medical Supervision and 24 Hour Rehab Nursing: x  Physical Therapy: x  PT Intensity/Duration: PT 1.5 hours per day/5 days per week  Occupational Therapy: x  OT Intensity/Duration: OT 1.5 hours per day/5 days per week  Social Work: x  Therapeutic Recreation: x  Updated (if changes indicated)    Anticipated Discharge Date/Estimated Length of Stay:   10-1-24      Discharge Plan of Care: Home Program    Based on the patient's medical and functional status, their prognosis and  expected level of functional improvement is: good      Interdisciplinary Problem/Goals/Status  Mobility    [RN] Bed/Chair/Wheelchair(Active)  Current Status(09/24/2024): Increase  physical activity  Weekly Goal(10/01/2024): Joint mobility maintained  Discharge Goal: Demonstrates use of adaptive equipment    [PT] Bed/Chair/Wheelchair(Active)  Current Status(09/25/2024): SBA  Weekly Goal(09/25/2024): n/a  Discharge Goal: independent    [PT] Walk(Active)  Current Status(09/25/2024): 300 ft, no assistive device, SBA  Weekly Goal(09/25/2024): n/a  Discharge Goal: 300 ft, mod independent, no assistive device    [PT] Stairs(Active)  Current Status(09/25/2024): TBA  Weekly Goal(09/25/2024): n/a  Discharge Goal: 12 stairs, 2 handrails, SBA        Safety    [RN] Potential for Injury(Active)  Current Status(09/24/2024): Free from injury this shift  Weekly Goal(10/01/2024): Prevent falls this week  Discharge Goal: Identifies measures to prevent injury; no falls this  hospitalization        Self Care    [OT] Toileting(Active)  Current Status(09/25/2024): Neftali/CGA  Weekly Goal(10/01/2024): CGA  Discharge Goal: supervision    Comments: Pt plans to go home with son Yogesh at discharge.    Signed by: MIGUEL Collins    Physician CoSigned By: Adams Hillman 10/03/2024 10:30:52

## 2024-10-11 ENCOUNTER — TELEPHONE (OUTPATIENT)
Dept: CARDIOLOGY | Facility: CLINIC | Age: 69
End: 2024-10-11
Payer: MEDICARE

## 2024-10-17 ENCOUNTER — OFFICE VISIT (OUTPATIENT)
Dept: CARDIOLOGY | Facility: CLINIC | Age: 69
End: 2024-10-17
Payer: MEDICARE

## 2024-10-17 VITALS
DIASTOLIC BLOOD PRESSURE: 68 MMHG | HEART RATE: 67 BPM | BODY MASS INDEX: 28.58 KG/M2 | OXYGEN SATURATION: 99 % | WEIGHT: 247 LBS | HEIGHT: 78 IN | SYSTOLIC BLOOD PRESSURE: 104 MMHG

## 2024-10-17 DIAGNOSIS — I48.19 ATRIAL FIBRILLATION, PERSISTENT: ICD-10-CM

## 2024-10-17 DIAGNOSIS — R73.03 PREDIABETES: Chronic | ICD-10-CM

## 2024-10-17 DIAGNOSIS — I63.412 CEREBROVASCULAR ACCIDENT (CVA) DUE TO EMBOLISM OF LEFT MIDDLE CEREBRAL ARTERY: Primary | ICD-10-CM

## 2024-10-17 DIAGNOSIS — I51.3 THROMBUS OF LEFT ATRIAL APPENDAGE: ICD-10-CM

## 2024-10-17 DIAGNOSIS — M17.12 PRIMARY OSTEOARTHRITIS OF LEFT KNEE: ICD-10-CM

## 2024-10-17 DIAGNOSIS — I50.21 ACUTE HFREF (HEART FAILURE WITH REDUCED EJECTION FRACTION): ICD-10-CM

## 2024-10-17 RX ORDER — METOPROLOL SUCCINATE 100 MG/1
100 TABLET, EXTENDED RELEASE ORAL DAILY
Qty: 60 TABLET | Refills: 3 | Status: SHIPPED | OUTPATIENT
Start: 2024-10-17

## 2024-10-17 RX ORDER — CLOPIDOGREL BISULFATE 75 MG/1
75 TABLET ORAL DAILY
Qty: 30 TABLET | Refills: 6 | Status: SHIPPED | OUTPATIENT
Start: 2024-10-17

## 2024-10-17 RX ORDER — FUROSEMIDE 20 MG
20 TABLET ORAL DAILY PRN
Qty: 30 TABLET | Refills: 0 | Status: SHIPPED | OUTPATIENT
Start: 2024-10-17

## 2024-10-17 RX ORDER — METOPROLOL SUCCINATE 50 MG/1
100 TABLET, EXTENDED RELEASE ORAL DAILY
Qty: 30 TABLET | Refills: 5 | Status: SHIPPED | OUTPATIENT
Start: 2024-10-17 | End: 2024-10-17 | Stop reason: DRUGHIGH

## 2024-10-17 RX ORDER — METOPROLOL SUCCINATE 50 MG/1
50 TABLET, EXTENDED RELEASE ORAL DAILY
Qty: 30 TABLET | Refills: 5 | Status: SHIPPED | OUTPATIENT
Start: 2024-10-17 | End: 2024-10-17

## 2024-10-17 RX ORDER — ATORVASTATIN CALCIUM 40 MG/1
40 TABLET, FILM COATED ORAL DAILY
Qty: 90 TABLET | Refills: 0 | Status: SHIPPED | OUTPATIENT
Start: 2024-10-17 | End: 2025-01-15

## 2024-10-17 RX ORDER — SPIRONOLACTONE 25 MG/1
12.5 TABLET ORAL DAILY
Qty: 15 TABLET | Refills: 0 | Status: SHIPPED | OUTPATIENT
Start: 2024-10-17 | End: 2024-11-16

## 2024-10-17 NOTE — PROGRESS NOTES
Rosaura Guzman, CHARISSE  No ref. provider found    Anshul Andrews  1955  10/17/2024    Subjective     Anshul Andrews is a 69 y.o. male who presents today to [facility] CARDIOLOGY for Follow-up (HOSPITAL FOLLOW UP ) and Atrial Fibrillation.    Follow-up  Atrial Fibrillation  Past medical history includes atrial fibrillation.   :    69-year-old male with history of embolic stroke to right MCA status post TNK and subsequent thrombectomy, atrial fibrillation with SHARYN showing multiple left atrial thrombi on Eliquis, heart failure with moderately reduced ejection fraction EF 30 to 35% on echo 9/15/2024, polycythemia, tobacco use, diabetes mellitus type 2, hypertension comes in today to establish care.     Patient reports that he went to [facility] after having an episode of syncope where he was found to have a right MCA stroke.  He underwent thrombectomy.  Subsequent workup included a SHARYN which showed multiple thrombi in his left atrial appendage.  There was a discussion about embolectomy but given the risk of the procedure it was not done and he was kept on anticoagulation.  Patient was transferred to [facility] inpatient rehab where he completed his rehab.  He says that he is able to do all the activities of his daily life without any residual weakness.  He walks in his yard every day.  He denies having any exertional chest pain or shortness of breath.  He denies having any orthopnea PND.  He denies experiencing any palpitations associated with dizziness or lightheadedness.               No Known Allergies:    Current Outpatient Medications:     apixaban (ELIQUIS) 5 MG tablet tablet, Take 1 tablet by mouth Every 12 (Twelve) Hours. Indications: Atrial Fibrillation, Disp: 60 tablet, Rfl: 0    atorvastatin (LIPITOR) 40 MG tablet, Take 1 tablet by mouth Daily for 90 days., Disp: 90 tablet, Rfl: 0    empagliflozin (JARDIANCE) 10 MG tablet tablet, Take 1 tablet by  "mouth Daily for 30 days., Disp: 30 tablet, Rfl: 0    Fluticasone-Salmeterol (Wixela Inhub) 250-50 MCG/ACT DISKUS, Inhale 1 puff 2 (Two) Times a Day., Disp: 60 each, Rfl: 5    spironolactone (ALDACTONE) 25 MG tablet, Take 0.5 tablets by mouth Daily for 30 days., Disp: 15 tablet, Rfl: 0    vitamin B-12 (VITAMIN B-12) 1000 MCG tablet, Take 1 tablet by mouth Daily for 30 days., Disp: 30 tablet, Rfl: 0    albuterol sulfate  (90 Base) MCG/ACT inhaler, Inhale 2 puffs Every 4 (Four) Hours As Needed for Wheezing., Disp: 8 g, Rfl: 2    clopidogrel (Plavix) 75 MG tablet, Take 1 tablet by mouth Daily., Disp: 30 tablet, Rfl: 6    furosemide (Lasix) 20 MG tablet, Take 1 tablet by mouth Daily As Needed (Leg swelling)., Disp: 30 tablet, Rfl: 0    metoprolol succinate XL (TOPROL-XL) 100 MG 24 hr tablet, Take 1 tablet by mouth Daily., Disp: 60 tablet, Rfl: 3    sacubitril-valsartan (ENTRESTO) 24-26 MG tablet, Take 1 tablet by mouth 2 (Two) Times a Day., Disp: 60 tablet, Rfl: 3    Past Medical History:   Diagnosis Date    Atrial fibrillation     Diabetes mellitus      Past Surgical History:   Procedure Laterality Date    KNEE SURGERY       Family History   Problem Relation Age of Onset    Heart attack Father     Heart attack Paternal Uncle      Social History     Tobacco Use    Smoking status: Every Day     Current packs/day: 1.00     Average packs/day: 1 pack/day for 24.8 years (24.8 ttl pk-yrs)     Types: Cigarettes     Start date: 2000    Smokeless tobacco: Never   Vaping Use    Vaping status: Never Used   Substance Use Topics    Alcohol use: Never    Drug use: Never       Objective   Blood pressure 104/68, pulse 67, height 198.1 cm (77.99\"), weight 112 kg (247 lb), SpO2 99%.  Body mass index is 28.55 kg/m².    Constitutional:       Appearance: Not in distress.   Pulmonary:      Breath sounds: Normal breath sounds.   Cardiovascular:      Tachycardia present. Irregularly irregular rhythm. Normal S1. Normal S2.       Murmurs: " "There is no murmur.   Edema:     Peripheral edema absent.           DATA:           No results found for: \"BNP\"  No results found for: \"PSA\"   No results found for: \"MG\"  No results found for: \"INR\"  No results found for: \"CKTOTAL\"  No results found for: \"CHOL\", \"CHLPL\"  No results found for: \"TRIG\"  No results found for: \"HDL\"  No results found for: \"LDL\", \"LDLDIRECT\"  No components found for: \"A1C\"      No results found for: \"TSH\"          Invalid input(s): \"LABALBU\", \"PROT\"        Results review: During today's encounter, all relevant clinical data has been reviewed.        ECG 12 Lead    Date/Time: 10/17/2024 11:01 AM  Performed by: Josh Martines MD    Authorized by: Josh Martines MD  Rhythm: atrial fibrillation  Rate: tachycardic  QRS axis: normal  Other findings: non-specific ST-T wave changes    Clinical impression: abnormal EKG        Assessment & Plan    Diagnosis Plan   1. Cerebrovascular accident (CVA) due to embolism of left middle cerebral artery  clopidogrel (Plavix) 75 MG tablet    atorvastatin (LIPITOR) 40 MG tablet    apixaban (ELIQUIS) 5 MG tablet tablet    empagliflozin (JARDIANCE) 10 MG tablet tablet    spironolactone (ALDACTONE) 25 MG tablet    sacubitril-valsartan (ENTRESTO) 24-26 MG tablet    Adult Transthoracic Echo Complete W/ Cont if Necessary Per Protocol    Ambulatory Referral to Cardiac Electrophysiology    Ambulatory Referral to Neurology    Lipid Panel    Magnesium    Comprehensive Metabolic Panel    CBC & Differential      2. Atrial fibrillation, persistent  ECG 12 Lead    clopidogrel (Plavix) 75 MG tablet    atorvastatin (LIPITOR) 40 MG tablet    apixaban (ELIQUIS) 5 MG tablet tablet    empagliflozin (JARDIANCE) 10 MG tablet tablet    spironolactone (ALDACTONE) 25 MG tablet    sacubitril-valsartan (ENTRESTO) 24-26 MG tablet    Adult Transthoracic Echo Complete W/ Cont if Necessary Per Protocol    Ambulatory Referral to Cardiac Electrophysiology    Ambulatory Referral to " Neurology    Lipid Panel    Magnesium    Comprehensive Metabolic Panel    CBC & Differential    metoprolol succinate XL (TOPROL-XL) 100 MG 24 hr tablet      3. Prediabetes        4. Acute HFrEF (heart failure with reduced ejection fraction)  clopidogrel (Plavix) 75 MG tablet    atorvastatin (LIPITOR) 40 MG tablet    apixaban (ELIQUIS) 5 MG tablet tablet    empagliflozin (JARDIANCE) 10 MG tablet tablet    spironolactone (ALDACTONE) 25 MG tablet    sacubitril-valsartan (ENTRESTO) 24-26 MG tablet    Adult Transthoracic Echo Complete W/ Cont if Necessary Per Protocol    Ambulatory Referral to Cardiac Electrophysiology    Ambulatory Referral to Neurology    Lipid Panel    Magnesium    Comprehensive Metabolic Panel    CBC & Differential    furosemide (Lasix) 20 MG tablet    BNP    metoprolol succinate XL (TOPROL-XL) 100 MG 24 hr tablet      5. Primary osteoarthritis of left knee  Ambulatory Referral to Orthopedic Surgery      6. Thrombus of left atrial appendage            Recommendations  Orders Placed This Encounter   Procedures    Lipid Panel    Magnesium    Comprehensive Metabolic Panel    BNP    Ambulatory Referral to Cardiac Electrophysiology    Ambulatory Referral to Neurology    Ambulatory Referral to Orthopedic Surgery    ECG 12 Lead    Adult Transthoracic Echo Complete W/ Cont if Necessary Per Protocol    CBC & Differential        For his history of heart failure with reduced ejection fraction, we will continue Jardiance 10 mg p.o. once daily and Aldactone 12.5 mg p.o. once daily.  Patient noted to be euvolemic and well-perfused on exam.  Will switch his metoprolol to tartrate 50 mg p.o. twice daily to Toprol- mg p.o. once daily.  Will start patient on Entresto 24 to 26 mg p.o. twice daily.  Advised the patient regarding the side effect of low blood pressure.  Advised the patient to monitor for side effects like dizziness or lightheadedness and stop the medication if he is having dizziness or  lightheadedness.  Patient and his daughter reported understanding.  Explained to the patient and the daughter that he is specifically at a higher risk if he falls given the fact that he is on Eliquis.  Will start patient on 20 mg p.o. Lasix as needed as needed for leg swelling.  Patient is already scheduled to see heart failure clinic on 10/28/2024.  Given his history of stroke, will switch his aspirin to Plavix 75 mg p.o. once daily due to lesser risk of bleeding when taken in combination with Eliquis.  Will continue Lipitor 40 mg p.o. once daily.   Will order a repeat echocardiogram to be done in mid of December to follow-up on the ejection fraction at the 3-month [place].  Will refer patient to electrophysiology given his history of persistent atrial fibrillation and potential need for a ICD if EF does not recover.  Not an ideal candidate for LifeVest given his A-fib with RVR  Will consider sending patient for ischemic evaluation on the ejection fraction after the repeat echo.   Will get lab work including lipid panel, magnesium level, CMP, BNP and CBC.  Patient reports that he has severe osteoarthritis and pain in his left knee.  Will refer patient to orthopedic surgery  Given patient's history of embolic stroke, will refer patient to neurology.       New Medications:   New Medications Ordered This Visit   Medications    clopidogrel (Plavix) 75 MG tablet     Sig: Take 1 tablet by mouth Daily.     Dispense:  30 tablet     Refill:  6    atorvastatin (LIPITOR) 40 MG tablet     Sig: Take 1 tablet by mouth Daily for 90 days.     Dispense:  90 tablet     Refill:  0    apixaban (ELIQUIS) 5 MG tablet tablet     Sig: Take 1 tablet by mouth Every 12 (Twelve) Hours. Indications: Atrial Fibrillation     Dispense:  60 tablet     Refill:  0    empagliflozin (JARDIANCE) 10 MG tablet tablet     Sig: Take 1 tablet by mouth Daily for 30 days.     Dispense:  30 tablet     Refill:  0    spironolactone (ALDACTONE) 25 MG tablet     Sig:  Take 0.5 tablets by mouth Daily for 30 days.     Dispense:  15 tablet     Refill:  0    sacubitril-valsartan (ENTRESTO) 24-26 MG tablet     Sig: Take 1 tablet by mouth 2 (Two) Times a Day.     Dispense:  60 tablet     Refill:  3    furosemide (Lasix) 20 MG tablet     Sig: Take 1 tablet by mouth Daily As Needed (Leg swelling).     Dispense:  30 tablet     Refill:  0    metoprolol succinate XL (TOPROL-XL) 100 MG 24 hr tablet     Sig: Take 1 tablet by mouth Daily.     Dispense:  60 tablet     Refill:  3       Discontinued Medications:   Medications Discontinued During This Encounter   Medication Reason    metoprolol tartrate (LOPRESSOR) 50 MG tablet Alternate therapy    aspirin 81 MG EC tablet Alternate therapy    apixaban (ELIQUIS) 5 MG tablet tablet Reorder    atorvastatin (LIPITOR) 40 MG tablet Reorder    empagliflozin (JARDIANCE) 10 MG tablet tablet Reorder    spironolactone (ALDACTONE) 25 MG tablet Reorder    metoprolol succinate XL (Toprol XL) 50 MG 24 hr tablet     metoprolol succinate XL (Toprol XL) 50 MG 24 hr tablet Dose adjustment        Return in about 9 weeks (around 12/19/2024).      Thank you for allowing me to participate in the care of Anshul Andrews. Feel free to contact me directly with any further questions or concerns.      This document has been electronically signed by Josh Martines MD   October 17, 2024 12:40 EDT    Dictated Utilizing Dragon Dictation: Part of this note may be an electronic transcription/translation of spoken language to printed text using the Dragon Dictation System.

## 2024-10-25 ENCOUNTER — TELEPHONE (OUTPATIENT)
Dept: CARDIOLOGY | Facility: CLINIC | Age: 69
End: 2024-10-25
Payer: MEDICARE

## 2024-10-28 ENCOUNTER — HOSPITAL ENCOUNTER (OUTPATIENT)
Dept: CARDIOLOGY | Facility: HOSPITAL | Age: 69
Discharge: HOME | End: 2024-10-28
Admitting: PHYSICIAN ASSISTANT
Payer: MEDICARE

## 2024-10-28 ENCOUNTER — TELEPHONE (OUTPATIENT)
Dept: CARDIOLOGY | Facility: CLINIC | Age: 69
End: 2024-10-28
Payer: MEDICARE

## 2024-10-28 ENCOUNTER — PHARMACY VISIT (OUTPATIENT)
Dept: PHARMACY | Facility: HOSPITAL | Age: 69
End: 2024-10-28
Payer: MEDICARE

## 2024-10-28 VITALS
SYSTOLIC BLOOD PRESSURE: 124 MMHG | WEIGHT: 243.8 LBS | DIASTOLIC BLOOD PRESSURE: 70 MMHG | BODY MASS INDEX: 28.79 KG/M2 | HEART RATE: 61 BPM | OXYGEN SATURATION: 97 % | HEIGHT: 77 IN

## 2024-10-28 DIAGNOSIS — I50.42 CHRONIC COMBINED SYSTOLIC AND DIASTOLIC HEART FAILURE, NYHA CLASS 2: ICD-10-CM

## 2024-10-28 DIAGNOSIS — I63.412 CEREBROVASCULAR ACCIDENT (CVA) DUE TO EMBOLISM OF LEFT MIDDLE CEREBRAL ARTERY: ICD-10-CM

## 2024-10-28 DIAGNOSIS — I50.22 CHRONIC HFREF (HEART FAILURE WITH REDUCED EJECTION FRACTION): Primary | ICD-10-CM

## 2024-10-28 DIAGNOSIS — I48.19 ATRIAL FIBRILLATION, PERSISTENT: ICD-10-CM

## 2024-10-28 LAB
ABSOLUTE LUNG FLUID CONTENT: 27 % (ref 20–35)
ANION GAP SERPL CALCULATED.3IONS-SCNC: 15 MMOL/L (ref 5–15)
BUN SERPL-MCNC: 16 MG/DL (ref 8–23)
BUN/CREAT SERPL: 12.5 (ref 7–25)
CALCIUM SPEC-SCNC: 9.8 MG/DL (ref 8.6–10.5)
CHLORIDE SERPL-SCNC: 102 MMOL/L (ref 98–107)
CO2 SERPL-SCNC: 23 MMOL/L (ref 22–29)
CREAT SERPL-MCNC: 1.28 MG/DL (ref 0.76–1.27)
EGFRCR SERPLBLD CKD-EPI 2021: 61 ML/MIN/1.73
GLUCOSE SERPL-MCNC: 122 MG/DL (ref 65–99)
MAGNESIUM SERPL-MCNC: 2.2 MG/DL (ref 1.6–2.4)
NT-PROBNP SERPL-MCNC: 444.9 PG/ML (ref 0–900)
POTASSIUM SERPL-SCNC: 4.3 MMOL/L (ref 3.5–5.2)
SODIUM SERPL-SCNC: 140 MMOL/L (ref 136–145)

## 2024-10-28 PROCEDURE — 83735 ASSAY OF MAGNESIUM: CPT | Performed by: PHYSICIAN ASSISTANT

## 2024-10-28 PROCEDURE — 80048 BASIC METABOLIC PNL TOTAL CA: CPT | Performed by: PHYSICIAN ASSISTANT

## 2024-10-28 PROCEDURE — 83880 ASSAY OF NATRIURETIC PEPTIDE: CPT | Performed by: PHYSICIAN ASSISTANT

## 2024-10-28 PROCEDURE — 94726 PLETHYSMOGRAPHY LUNG VOLUMES: CPT | Performed by: PHYSICIAN ASSISTANT

## 2024-10-28 PROCEDURE — 36415 COLL VENOUS BLD VENIPUNCTURE: CPT | Performed by: PHYSICIAN ASSISTANT

## 2024-10-28 RX ORDER — VERICIGUAT 2.5 MG/1
2.5 TABLET, FILM COATED ORAL DAILY
Qty: 14 TABLET | Refills: 0 | COMMUNITY
Start: 2024-10-28 | End: 2024-11-11

## 2024-10-28 RX ORDER — VERICIGUAT 5 MG/1
5 TABLET, FILM COATED ORAL DAILY
Qty: 14 TABLET | Refills: 0 | Status: SHIPPED | OUTPATIENT
Start: 2024-11-11 | End: 2024-11-25

## 2024-10-28 NOTE — TELEPHONE ENCOUNTER
Hub to relay.     Called pt's pharmacy and they stated they don't take in [place]. He can check around at other insurances and see if they do. No answer VM is full.

## 2024-10-28 NOTE — PROGRESS NOTES
[facility]  JOCELYN Dacosta Brandy Jean, APRN  [address] [place] [id number]    Thank you for asking me to see Anshul Andrews for congestive heart failure.    HPI:     This is a 69 y.o. male with known past medical history of:    HFrEF  SHARYN from 09/17/24 with EF around 30%; severe left atrial enlargement. Severe global hypokinetic left atrial appendage  Paroxysmal atrial fibrillation  Right MCA stroke  DM   Essential HTN  Polycythemia  Hx of cardiac thrombi  Tobacco abuse    Anshul Andrews presents for today for Heart Failure clinic evaluation.  The patient is typically seen by Rosaura Guzman APRN.  Patient's primary cardiologist is Dr. Martines.     Last known hospitalization and/or ED visit: Patient was initially hospitalized at the [facility] with right MCA stroke during which time he was found to have new onset atrial fibrillation as well as HFrEF with ejection fraction around 30%  Accompanied by: Daughter          10/28/2024 visit data/details regarding:   Dyspnea: Denies dyspnea on exertion  Lower extremity swelling: Denies swelling of lower extremities  Abdominal swelling: Has abdominal distention  Home weight: Weight monitoring booklet provided during initial visit; provided an initial visit  Home BP: BP monitoring booklet provided during initial visit; pressure cuff provided in initial visit  Home heart rate: HR monitoring booklet provided during initial visit;  Daily activities of living: Performing independently  Pillows/lying flat: 2 pillows  HF zone: Green  Mr. Andrews is chest pain free.  He denies shortness of breath.  He reports until September when he had his stroke he was not on any home medications and though himself to be somewhat healthy.   We discuss recent hospitalization, rehab stay, and now recovery in addition to HFrEF diagnosis.          Review of Systems - Review of Systems   Constitutional: Negative for  decreased appetite, diaphoresis and fever.   HENT:  Negative for congestion and ear pain.    Eyes:  Negative for blurred vision.   Cardiovascular:  Negative for chest pain and dyspnea on exertion.   Respiratory:  Negative for cough and shortness of breath.    Endocrine: Negative for cold intolerance and polydipsia.   Hematologic/Lymphatic: Negative for adenopathy and bleeding problem.   Skin:  Negative for dry skin and nail changes.   Musculoskeletal:  Negative for arthritis and falls.   Gastrointestinal:  Negative for bloating and anorexia.   Genitourinary:  Negative for bladder incontinence and dysuria.   Neurological:  Negative for brief paralysis and difficulty with concentration.   Psychiatric/Behavioral:  Negative for altered mental status and hallucinations.          All other systems were reviewed and were negative.    Patient Active Problem List   Diagnosis    CVA (cerebral vascular accident)    Cardiac arrhythmia    Simple chronic bronchitis    Prediabetes       family history includes Heart attack in his father and paternal uncle.     reports that he has quit smoking. His smoking use included cigarettes. He started smoking about 24 years ago. He has a 24.8 pack-year smoking history. He has never used smokeless tobacco. He reports that he does not drink alcohol and does not use drugs.    No Known Allergies      Current Outpatient Medications:     albuterol sulfate  (90 Base) MCG/ACT inhaler, Inhale 2 puffs Every 4 (Four) Hours As Needed for Wheezing., Disp: 8 g, Rfl: 2    apixaban (ELIQUIS) 5 MG tablet tablet, Take 1 tablet by mouth Every 12 (Twelve) Hours. Indications: Atrial Fibrillation, Disp: 60 tablet, Rfl: 0    atorvastatin (LIPITOR) 40 MG tablet, Take 1 tablet by mouth Daily for 90 days., Disp: 90 tablet, Rfl: 0    clopidogrel (Plavix) 75 MG tablet, Take 1 tablet by mouth Daily., Disp: 30 tablet, Rfl: 6    empagliflozin (JARDIANCE) 10 MG tablet tablet, Take 1 tablet by mouth Daily for 30  days., Disp: 30 tablet, Rfl: 3    Fluticasone-Salmeterol (Wixela Inhub) 250-50 MCG/ACT DISKUS, Inhale 1 puff 2 (Two) Times a Day., Disp: 60 each, Rfl: 5    furosemide (Lasix) 20 MG tablet, Take 1 tablet by mouth Daily As Needed (Leg swelling)., Disp: 30 tablet, Rfl: 0    metoprolol succinate XL (TOPROL-XL) 100 MG 24 hr tablet, Take 1 tablet by mouth Daily., Disp: 60 tablet, Rfl: 3    sacubitril-valsartan (ENTRESTO) 24-26 MG tablet, Take 1 tablet by mouth 2 (Two) Times a Day., Disp: 60 tablet, Rfl: 3    spironolactone (ALDACTONE) 25 MG tablet, Take 0.5 tablets by mouth Daily for 30 days., Disp: 15 tablet, Rfl: 0    Vericiguat (Verquvo) 2.5 MG tablet, Take 1 tablet by mouth Daily for 14 days., Disp: 14 tablet, Rfl: 0    [START ON 11/11/2024] Vericiguat (Verquvo) 5 MG tablet, Take 1 tablet by mouth Daily for 14 days., Disp: 14 tablet, Rfl: 0      Physical Exam:  I have reviewed the patient's current vital signs as documented in the patient's EMR.   Vitals:    10/28/24 0828   BP: 124/70   Pulse: 61   SpO2: 97%     Body mass index is 28.91 kg/m².       10/28/24  0828   Weight: 111 kg (243 lb 12.8 oz)      Physical Exam  Vitals and nursing note reviewed.   Constitutional:       General: He is awake.      Appearance: Normal appearance. He is well-developed and well-groomed.   HENT:      Head: Normocephalic and atraumatic.   Eyes:      General: Lids are normal.      Conjunctiva/sclera:      Right eye: Right conjunctiva is not injected.      Left eye: Left conjunctiva is not injected.   Cardiovascular:      Rate and Rhythm: Normal rate. Rhythm irregularly irregular.      Heart sounds:      No systolic murmur is present.      No diastolic murmur is present.   Pulmonary:      Effort: No tachypnea or bradypnea.      Breath sounds: No decreased breath sounds, wheezing or rhonchi.   Abdominal:      General: Bowel sounds are normal.      Palpations: Abdomen is soft.      Tenderness: There is no abdominal tenderness.  "  Musculoskeletal:      Right lower leg: No edema.      Left lower leg: No edema.   Neurological:      Mental Status: He is alert and oriented to person, place, and time.   Psychiatric:         Attention and Perception: Attention normal.         Mood and Affect: Mood normal.         Behavior: Behavior is cooperative.        JVP: Volume/Pulsation: Normal.        DATA REVIEWED:     ---------------------------------------------------  TTE/SHARYN:        LAST HEART CATH RESULT/IF AVAILABLE:     No results found for this or any previous visit.      -----------------------------------------------------  CXR/Imaging:   Imaging Results (Most Recent)       None            I personally reviewed and interpreted the CXR.      -----------------------------------------------------  CT:   No radiology results for the last 30 days.  I personally reviewed the images of the CT scan.  My personal interpretation is below.      ----------------------------------------------------      --------------------------------------------------------------------------------------------------    Laboratory evaluations:    Lab Results   Component Value Date    GLUCOSE 122 (H) 10/28/2024    BUN 16 10/28/2024    CREATININE 1.28 (H) 10/28/2024    BCR 12.5 10/28/2024    K 4.3 10/28/2024    CO2 23.0 10/28/2024    CALCIUM 9.8 10/28/2024    ALBUMIN 3.8 09/25/2024    AST 34 09/25/2024    ALT 44 (H) 09/25/2024     Lab Results   Component Value Date    WBC 13.42 (H) 09/25/2024    HGB 17.0 09/25/2024    HCT 54.1 (H) 09/25/2024    MCV 85.5 09/25/2024     09/25/2024     No results found for: \"CHOL\", \"CHLPL\", \"TRIG\", \"HDL\", \"LDL\", \"LDLDIRECT\"  No results found for: \"TSH\", \"S3UVRCZ\", \"L9BAIEU\", \"THYROIDAB\"  Lab Results   Component Value Date    HGBA1C 6.20 (H) 09/25/2024     Lab Results   Component Value Date    ALT 44 (H) 09/25/2024     Lab Results   Component Value Date    HGBA1C 6.20 (H) 09/25/2024     Lab Results   Component Value Date    CREATININE 1.28 " "(H) 10/28/2024     No results found for: \"IRON\", \"TIBC\", \"FERRITIN\"  Lab Results   Component Value Date    INR 1.1 09/13/2024    PROTIME 11.4 09/13/2024               1. Chronic HFrEF (heart failure with reduced ejection fraction)    2. Cerebrovascular accident (CVA) due to embolism of left middle cerebral artery    3. Atrial fibrillation, persistent    4. Chronic combined systolic and diastolic heart failure, NYHA class 2          ORDERS PLACED TODAY:  Orders Placed This Encounter   Procedures    ReDs Vest    Basic Metabolic Panel    Magnesium    proBNP    Basic Metabolic Panel    Magnesium    proBNP        Diagnoses and all orders for this visit:    1. Chronic HFrEF (heart failure with reduced ejection fraction) (Primary)  -     Basic Metabolic Panel; Future  -     Magnesium; Future  -     proBNP; Future  -     ReDs Vest  -     Basic Metabolic Panel; Standing  -     Basic Metabolic Panel  -     Magnesium; Standing  -     Magnesium  -     proBNP; Standing  -     proBNP    2. Cerebrovascular accident (CVA) due to embolism of left middle cerebral artery  -     empagliflozin (JARDIANCE) 10 MG tablet tablet; Take 1 tablet by mouth Daily for 30 days.  Dispense: 30 tablet; Refill: 3  -     sacubitril-valsartan (ENTRESTO) 24-26 MG tablet; Take 1 tablet by mouth 2 (Two) Times a Day.  Dispense: 60 tablet; Refill: 3    3. Atrial fibrillation, persistent  -     empagliflozin (JARDIANCE) 10 MG tablet tablet; Take 1 tablet by mouth Daily for 30 days.  Dispense: 30 tablet; Refill: 3  -     sacubitril-valsartan (ENTRESTO) 24-26 MG tablet; Take 1 tablet by mouth 2 (Two) Times a Day.  Dispense: 60 tablet; Refill: 3    4. Chronic combined systolic and diastolic heart failure, NYHA class 2  -     empagliflozin (JARDIANCE) 10 MG tablet tablet; Take 1 tablet by mouth Daily for 30 days.  Dispense: 30 tablet; Refill: 3  -     sacubitril-valsartan (ENTRESTO) 24-26 MG tablet; Take 1 tablet by mouth 2 (Two) Times a Day.  Dispense: 60 " tablet; Refill: 3    Other orders  -     Vericiguat (Verquvo) 2.5 MG tablet; Take 1 tablet by mouth Daily for 14 days.  Dispense: 14 tablet; Refill: 0  -     Vericiguat (Verquvo) 5 MG tablet; Take 1 tablet by mouth Daily for 14 days.  Dispense: 14 tablet; Refill: 0             MEDS ORDERED TODAY:    New Medications Ordered This Visit   Medications    empagliflozin (JARDIANCE) 10 MG tablet tablet     Sig: Take 1 tablet by mouth Daily for 30 days.     Dispense:  30 tablet     Refill:  3    sacubitril-valsartan (ENTRESTO) 24-26 MG tablet     Sig: Take 1 tablet by mouth 2 (Two) Times a Day.     Dispense:  60 tablet     Refill:  3    Vericiguat (Verquvo) 2.5 MG tablet     Sig: Take 1 tablet by mouth Daily for 14 days.     Dispense:  14 tablet     Refill:  0    Vericiguat (Verquvo) 5 MG tablet     Sig: Take 1 tablet by mouth Daily for 14 days.     Dispense:  14 tablet     Refill:  0        ---------------------------------------------------------------------------------------------------------------------------          ASSESSMENT/PLAN:      Diagnosis Plan   1. Chronic HFrEF (heart failure with reduced ejection fraction)  Basic Metabolic Panel    Magnesium    proBNP    ReDs Vest    Basic Metabolic Panel    Basic Metabolic Panel    Magnesium    Magnesium    proBNP    proBNP      2. Cerebrovascular accident (CVA) due to embolism of left middle cerebral artery  empagliflozin (JARDIANCE) 10 MG tablet tablet    sacubitril-valsartan (ENTRESTO) 24-26 MG tablet      3. Atrial fibrillation, persistent  empagliflozin (JARDIANCE) 10 MG tablet tablet    sacubitril-valsartan (ENTRESTO) 24-26 MG tablet      4. Chronic combined systolic and diastolic heart failure, NYHA class 2  empagliflozin (JARDIANCE) 10 MG tablet tablet    sacubitril-valsartan (ENTRESTO) 24-26 MG tablet            NYHA stage Stage C: Structural heart disease is present AND symptoms have occurredFC-Class II: Slight limitation of physical activity. Comfortable at rest  Ordinary physical activity results in fatigue, palpitation, dyspnea (shortness of breath).     Today, Patient appears euvolemic.and with  Moderate perfusion. The patient's hemodynamics are currently acceptable. HR is: normal and is at goal. BP/MAP was reviewed and there isroom for medication up-titration.  Clinical trajectory was assessed and haswaxed and waned.     CHF GOAL DIRECTED MEDICAL THERAPY FOR PATIENT ADDRESSED/ADJUSTED:     GDMT: HFrEF    Drug Class   Drug   Dose Last Dose Adjustment Notes   ACEi/ARB/ARNI Entresto 24-26mg BID     Beta Blocker Toprol XL  100mg     MRA Spironolactone 12.5mg qd     SGLT2i Jardiance  10mg  N/A   Secondaries if applicable:  Add Verquevo        -CHF Specific BB:    Metoprolol Succinate  at dose of 100mg.   We discussed processes/benefits of HF clinic including nursing, pharmacist, and provider evaluation during each visit with ability for in office ReDS vest, labs, and ability to provide IV diuresis in the clinic with close outpatient monitoring.  Additionally, patient was educated about the availability of delivery of medications to patient's clinic room prior to leaving the building which assists with medication compliance and insures medications are in hands when changes are made (if patient opts for apothecary usage) with thorough guidance regarding changes and medication schedule provided.          -ACE/ARB/ARNi:   Continue current Entresto dose.     -MRA:   Continue Aldactone dose.     -SGLT2 inhibitor therapy:   Continue Jardiance 10mg.      -Diuretic regimen:   ReDS Vest reading for. 10/28/24 is  27; ReDs Vest reading reviewed with patient.    Continue daily MRA with PRN Lasix (has not required dose recently)   BMP, Mag, & ProBNP reviewed with patient.      -Fluid restriction/Sodium restriction:   Requested 2000 ml restriction  Patient has been asked to weigh daily and was provided with a printed diuretic strategy.  1,500 mg Na restriction was discussed.    Secondary  pharmacological therapy in HFreF:   EF is less than 45% @ 30%.   Maximized on GDMT as tolerated thus far including Spironolactone, Jardiance, beta blocker, Entresto.   Recent hospitalization or IV diuresis includes 09/2024  Given HFrEF with optimally treated with primary therapies for HFrEF and vasodilator therapy, will consider addition of Verquevo if approved by insurance.     -Acute and/or Chronic underlying conditions other than HF addressed during visit:   Paroxysmal atrial fibrillation  Chronic anticoagulation:   Hx MCA CVA:   Continue Eliquis 5mg BID.   Continue f/u with [facility].     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            This document has been electronically signed by Mayte Merino PA-C  October 28, 2024 10:54 EDT      Dictated Utilizing Dragon Dictation: Part of this note may be an electronic transcription/translation of spoken language to printed text using the Dragon Dictation System.    Follow-up appointment and medication changes provided in hand delivered After Visit Summary as well as reviewed in the room.

## 2024-10-28 NOTE — PROGRESS NOTES
Heart Failure Clinic    Date: 10/28/24     Vitals:    10/28/24 0828   BP: 124/70   Pulse: 61   SpO2: 97%    Weight 243.8    Method of arrival: Ambulatory    Weighing self daily: No    Monitoring Heart Failure Zones: No    Today's HF Zone: Reviewed Zones    Taking medications as prescribed: Yes    Edema No    Shortness of Air: No    Number of pillows used at night:<2    Educational Materials given:  AVS, Heart Success patient booklet                                                                         ReDS Value:   25-35 Optimal Value Status      Shikha Velasquez RN 10/28/24 08:30 EDT

## 2024-10-28 NOTE — PROGRESS NOTES
Heart Failure Clinic  Pharmacist Note     Anshul Andrews is a 69 y.o. male seen in the Heart Failure Clinic for HFrEF with last EF 30-35% on 9/15/24. .  Anshul Andrews reports a fair understanding of medications and is accompanied by his daughter who assists him with his medications. Patient historically has not been on medication prior to his stroke. Patient was admitted to [facility] on 9/17/24 for right MCA stroke and had thrombectomy. It was during this hospital admission that he reports he was diagnosed with heart failure. Patient was then transferred to [facility] where he stayed from 9/24-10/1. Since patient discharge he has followed with Dr. Martines, cardiology, and has had medications adjusted even since discharge. All of patients medications are new.     Patient requests a BP cuff and scale today. He has not been checking his BP or weighing at home. He denies any SOB or swelling today. He denies any episodes of dizziness.  He is currently prescribed Lasix 20 mg PRN from Dr. Martines but reports he has not had to use any of this. Patient reports he was a smoker but that he has quit. Patient denies any bleeding issues or UTI/yeast infections.     Patient does report upcoming issues with medication availalbility. Patient is a residence of [place] and moving to [place]. He and his daughter are in the process of getting his Medicare switched to [place]. Patient and daughter are unsure if he will qualify for medicaid. Pt does endorse that he only has Part A and B Medicare. No Part D coverage. Unsure if patient maybe has [place] medicaid because they were speaking of insurance not working in [place] until he had a change of address.      Medication Use:   Hx of med intolerances:  None related to HF  Retail Rx Management: [facility]      Past Medical History:   Diagnosis Date    Atrial fibrillation     Diabetes mellitus      ALLERGIES: Patient has no known allergies.  Current Outpatient Medications   Medication Sig Dispense  "Refill    albuterol sulfate  (90 Base) MCG/ACT inhaler Inhale 2 puffs Every 4 (Four) Hours As Needed for Wheezing. 8 g 2    apixaban (ELIQUIS) 5 MG tablet tablet Take 1 tablet by mouth Every 12 (Twelve) Hours. Indications: Atrial Fibrillation 60 tablet 0    atorvastatin (LIPITOR) 40 MG tablet Take 1 tablet by mouth Daily for 90 days. 90 tablet 0    clopidogrel (Plavix) 75 MG tablet Take 1 tablet by mouth Daily. 30 tablet 6    empagliflozin (JARDIANCE) 10 MG tablet tablet Take 1 tablet by mouth Daily for 30 days. 30 tablet 3    Fluticasone-Salmeterol (Wixela Inhub) 250-50 MCG/ACT DISKUS Inhale 1 puff 2 (Two) Times a Day. 60 each 5    furosemide (Lasix) 20 MG tablet Take 1 tablet by mouth Daily As Needed (Leg swelling). 30 tablet 0    metoprolol succinate XL (TOPROL-XL) 100 MG 24 hr tablet Take 1 tablet by mouth Daily. 60 tablet 3    sacubitril-valsartan (ENTRESTO) 24-26 MG tablet Take 1 tablet by mouth 2 (Two) Times a Day. 60 tablet 3    spironolactone (ALDACTONE) 25 MG tablet Take 0.5 tablets by mouth Daily for 30 days. 15 tablet 0     No current facility-administered medications for this encounter.       Vaccination History:   Pneumonia: Pt reports UTD and that he goes through PCP   Annual Influenza: [id number] received on 10/3/24  Shingles: Ask at next visit     Objective  Vitals:    10/28/24 0828   BP: 124/70   BP Location: Left arm   Patient Position: Sitting   Cuff Size: Adult   Pulse: 61   SpO2: 97%   Weight: 111 kg (243 lb 12.8 oz)   Height: 195.6 cm (77\")     Wt Readings from Last 3 Encounters:   10/28/24 111 kg (243 lb 12.8 oz)   10/17/24 112 kg (247 lb)   10/03/24 116 kg (256 lb)         10/28/24  0828   Weight: 111 kg (243 lb 12.8 oz)     Lab Results   Component Value Date    GLUCOSE 122 (H) 10/28/2024    BUN 16 10/28/2024    CREATININE 1.28 (H) 10/28/2024    BCR 12.5 10/28/2024    K 4.3 10/28/2024    CO2 23.0 10/28/2024    CALCIUM 9.8 10/28/2024    ALBUMIN 3.8 09/25/2024    AST 34 09/25/2024    ALT " "44 (H) 09/25/2024     Lab Results   Component Value Date    WBC 13.42 (H) 09/25/2024    HGB 17.0 09/25/2024    HCT 54.1 (H) 09/25/2024    MCV 85.5 09/25/2024     09/25/2024     No results found for: \"CKTOTAL\", \"CKMB\", \"CKMBINDEX\", \"TROPONINI\", \"TROPONINT\"  Lab Results   Component Value Date    PROBNP 444.9 10/28/2024            GDMT    Drug Class   Drug   Dose Last Dose Adjustment Additional Titration   Notes   ACEi/ARB/ARNI Entresto  24/26 mg 10/17/24     Beta Blocker Toprol  mg  10/17/24     MRA Spironolactone 12.5 mg 10/17/24     SGLT2i Jardiance  10 mg  10/2/24 N/A        Drug Therapy Problems    1. Drug Interactions Screening  2. Drug-Disease Interactions:   3. GDMT  4. Shingles vaccine?  5. Patient Assistance     Recommendations:     Eliquis+ Plavix: Increased risk of bleeding. Continue to monitor. Pt denies any s/sx today.   2. NSAIDs: Patient aware to avoid NSAIDs due to increased risk of bleeding as well as complications with HF diagnosis.   3.  Mayte to add Verquvo. Samples given of Verquvo 2.5 mg x 14 days then titrate to 5 mg x 14 days. Pending insurance resolution will dictate further titration, addition of medications.  4. Ask at next visit  5. Stephanie, care coordinator, has come to clinic to speak with patient about possible LIS,  assistance program, and/or samples from Dr. Martines's office for Eliquis assistance and possibility of HF vega to assist with HF meds if patient can get Part D? Will place outreach to follow up with at end of week to see of any updates. Daughter and patient were headed to social security office today. Unsure if patient has [place] medicaid or just a Part A and B Medicare?    Discharge medications have been reviewed and reconciled.    Patient was educated on heart failure medications and the importance of medication adherence. All questions were addressed and patient expressed  understanding. Used teach-back method to assess understanding.     Thank you for " allowing me to participate in the care of your patient,    Keila Howe. Azeb, PharmD  10/28/24  09:21 EDT

## 2024-10-28 NOTE — TELEPHONE ENCOUNTER
Caller Name: NIKITA OCAMPO      Relationship: Child      Best Contact Number: [phone]      Patient is requesting samples of ELIQUIS      How many days of medication do you have left? WEDNESDAY

## 2024-10-29 NOTE — TELEPHONE ENCOUNTER
Called Amairani back and advised her that we can give him some samples until we see if his new insurance will cover it. She expressed understanding and will come by tomorrow to get some.

## 2024-10-29 NOTE — TELEPHONE ENCOUNTER
Hub to relay.     Called pt's pharmacy and they stated they don't take in his [place] insurance. He can check around at other insurances and see if they do. No answer GUSTAVO.

## 2024-10-29 NOTE — TELEPHONE ENCOUNTER
Caller: NIKITA OCAMPO    Relationship: Child    Best call back number: [phone]     What is the best time to reach you: ANY     What was the call regarding: STATES THIS IS A SAMPLE REQUEST, NOT A REFILL @ PHARM REQUEST. PLEASE ADVISE IF WE HAVE THESE AVAILABLE. PT WILL BE OUT ON WEDNESDAY, TOMORROW.     CALLER ASKING IF THEY ARE UNABLE TO GET SAMPLES AND UNABLE TO AFFORD MEDS WHAT ARE THEY TO DO? PLEASE ADVISE ON SOME OPTIONS     Is it okay if the provider responds through MyChart: CALL

## 2024-11-07 ENCOUNTER — OFFICE VISIT (OUTPATIENT)
Dept: FAMILY MEDICINE CLINIC | Facility: CLINIC | Age: 69
End: 2024-11-07
Payer: MEDICARE

## 2024-11-07 VITALS
OXYGEN SATURATION: 96 % | TEMPERATURE: 97.3 F | DIASTOLIC BLOOD PRESSURE: 60 MMHG | WEIGHT: 240 LBS | HEART RATE: 74 BPM | SYSTOLIC BLOOD PRESSURE: 110 MMHG | HEIGHT: 77 IN | BODY MASS INDEX: 28.34 KG/M2 | RESPIRATION RATE: 16 BRPM

## 2024-11-07 DIAGNOSIS — I48.0 PAROXYSMAL ATRIAL FIBRILLATION: Chronic | ICD-10-CM

## 2024-11-07 DIAGNOSIS — J41.0 SIMPLE CHRONIC BRONCHITIS: Chronic | ICD-10-CM

## 2024-11-07 DIAGNOSIS — I63.412 CEREBROVASCULAR ACCIDENT (CVA) DUE TO EMBOLISM OF LEFT MIDDLE CEREBRAL ARTERY: Chronic | ICD-10-CM

## 2024-11-07 DIAGNOSIS — Z00.00 MEDICARE ANNUAL WELLNESS VISIT, SUBSEQUENT: Primary | ICD-10-CM

## 2024-11-07 PROCEDURE — 1159F MED LIST DOCD IN RCRD: CPT | Performed by: NURSE PRACTITIONER

## 2024-11-07 PROCEDURE — 1160F RVW MEDS BY RX/DR IN RCRD: CPT | Performed by: NURSE PRACTITIONER

## 2024-11-07 PROCEDURE — 1170F FXNL STATUS ASSESSED: CPT | Performed by: NURSE PRACTITIONER

## 2024-11-07 PROCEDURE — 3044F HG A1C LEVEL LT 7.0%: CPT | Performed by: NURSE PRACTITIONER

## 2024-11-07 PROCEDURE — G0439 PPPS, SUBSEQ VISIT: HCPCS | Performed by: NURSE PRACTITIONER

## 2024-11-07 PROCEDURE — 1126F AMNT PAIN NOTED NONE PRSNT: CPT | Performed by: NURSE PRACTITIONER

## 2024-11-07 NOTE — PROGRESS NOTES
Subjective   The ABCs of the Annual Wellness Visit  Medicare Wellness Visit      Anshul Andrews is a 69 y.o. patient who presents for a Medicare Wellness Visit.    The following portions of the patient's history were reviewed and   updated as appropriate: allergies, current medications, past family history, past medical history, past social history, past surgical history, and problem list.    Compared to one year ago, the patient's physical   health is worse.  Compared to one year ago, the patient's mental   health is the same.    Recent Hospitalizations:  This patient has had a [facility] admission record on file within the last 365 days.  Current Medical Providers:  Patient Care Team:  Rosaura Guzman APRN as PCP - General (Family Medicine)    Outpatient Medications Prior to Visit   Medication Sig Dispense Refill    albuterol sulfate  (90 Base) MCG/ACT inhaler Inhale 2 puffs Every 4 (Four) Hours As Needed for Wheezing. 8 g 2    apixaban (ELIQUIS) 5 MG tablet tablet Take 1 tablet by mouth Every 12 (Twelve) Hours. Indications: Atrial Fibrillation 60 tablet 0    apixaban (ELIQUIS) 5 MG tablet tablet Take 1 tablet by mouth 2 (Two) Times a Day. 56 tablet 0    atorvastatin (LIPITOR) 40 MG tablet Take 1 tablet by mouth Daily for 90 days. 90 tablet 0    clopidogrel (Plavix) 75 MG tablet Take 1 tablet by mouth Daily. 30 tablet 6    empagliflozin (JARDIANCE) 10 MG tablet tablet Take 1 tablet by mouth Daily for 30 days. 30 tablet 3    Fluticasone-Salmeterol (Wixela Inhub) 250-50 MCG/ACT DISKUS Inhale 1 puff 2 (Two) Times a Day. 60 each 5    furosemide (Lasix) 20 MG tablet Take 1 tablet by mouth Daily As Needed (Leg swelling). 30 tablet 0    metoprolol succinate XL (TOPROL-XL) 100 MG 24 hr tablet Take 1 tablet by mouth Daily. 60 tablet 3    sacubitril-valsartan (ENTRESTO) 24-26 MG tablet Take 1 tablet by mouth 2 (Two) Times a Day. 60 tablet 3    spironolactone (ALDACTONE) 25 MG tablet Take 0.5 tablets by  "mouth Daily for 30 days. 15 tablet 0    Vericiguat (Verquvo) 2.5 MG tablet Take 1 tablet by mouth Daily for 14 days. 14 tablet 0    [START ON 11/11/2024] Vericiguat (Verquvo) 5 MG tablet Take 1 tablet by mouth Daily for 14 days. 14 tablet 0     No facility-administered medications prior to visit.     No opioid medication identified on active medication list. I have reviewed chart for other potential  high risk medication/s and harmful drug interactions in the elderly.      Aspirin is not on active medication list.  Aspirin use is not indicated based on review of current medical condition/s. Risk of harm outweighs potential benefits.  .    Patient Active Problem List   Diagnosis    CVA (cerebral vascular accident)    Cardiac arrhythmia    Simple chronic bronchitis    Prediabetes    Atrial fibrillation     Advance Care Planning Advance Directive is not on file.  ACP discussion was held with the patient during this visit. Patient does not have an advance directive, declines further assistance.            Objective   Vitals:    11/07/24 1301   BP: 110/60   BP Location: Right arm   Patient Position: Sitting   Cuff Size: Large Adult   Pulse: 74   Resp: 16   Temp: 97.3 °F (36.3 °C)   TempSrc: Tympanic   SpO2: 96%   Weight: 109 kg (240 lb)   Height: 195.6 cm (77.01\")   PainSc: 0-No pain       Estimated body mass index is 28.45 kg/m² as calculated from the following:    Height as of this encounter: 195.6 cm (77.01\").    Weight as of this encounter: 109 kg (240 lb).            Does the patient have evidence of cognitive impairment? No  Lab Results   Component Value Date    HGBA1C 6.20 (H) 09/25/2024                                                                                                Health  Risk Assessment    Smoking Status:  Social History     Tobacco Use   Smoking Status Former    Current packs/day: 1.00    Average packs/day: 1 pack/day for 24.8 years (24.8 ttl pk-yrs)    Types: Cigarettes    Start date: 2000 "   Smokeless Tobacco Never     Alcohol Consumption:  Social History     Substance and Sexual Activity   Alcohol Use Never       Fall Risk Screen  STEADI Fall Risk Assessment was completed, and patient is at LOW risk for falls.Assessment completed on:2024    Depression Screenin/7/2024     1:06 PM   PHQ-2/PHQ-9 Depression Screening   Little interest or pleasure in doing things Not at all   Feeling down, depressed, or hopeless Not at all     Health Habits and Functional and Cognitive Screenin/7/2024     1:03 PM   Functional & Cognitive Status   Do you have difficulty preparing food and eating? No   Do you have difficulty bathing yourself, getting dressed or grooming yourself? No   Do you have difficulty using the toilet? No   Do you have difficulty moving around from place to place? No   Do you have trouble with steps or getting out of a bed or a chair? No   Current Diet Well Balanced Diet   Dental Exam Up to date   Eye Exam Up to date   Exercise (times per week) 7 times per week   Current Exercises Include Yard Work;Walking;House Cleaning   Do you need help using the phone?  No   Are you deaf or do you have serious difficulty hearing?  No   Do you need help to go to places out of walking distance? No   Do you need help shopping? No   Do you need help preparing meals?  No   Do you need help with housework?  No   Do you need help with laundry? No   Do you need help taking your medications? No   Do you need help managing money? No   Do you ever drive or ride in a car without wearing a seat belt? No   Have you felt unusual stress, anger or loneliness in the last month? No   Who do you live with? Alone   If you need help, do you have trouble finding someone available to you? No   Have you been bothered in the last four weeks by sexual problems? No   Do you have difficulty concentrating, remembering or making decisions? No           Age-appropriate Screening Schedule:  Refer to the list below for  future screening recommendations based on patient's age, sex and/or medical conditions. Orders for these recommended tests are listed in the plan section. The patient has been provided with a written plan.    Health Maintenance List  Health Maintenance   Topic Date Due    URINE MICROALBUMIN  Never done    COLORECTAL CANCER SCREENING  Never done    Pneumococcal Vaccine 65+ (1 of 2 - PCV) Never done    DIABETIC EYE EXAM  Never done    TDAP/TD VACCINES (1 - Tdap) Never done    ZOSTER VACCINE (1 of 2) Never done    COVID-19 Vaccine (1 - 2024-25 season) Never done    HEPATITIS C SCREENING  Never done    ANNUAL WELLNESS VISIT  Never done    DIABETIC FOOT EXAM  Never done    AAA SCREEN (ONE-TIME)  Never done    HEMOGLOBIN A1C  03/25/2025    BMI FOLLOWUP  10/03/2025    INFLUENZA VACCINE  Completed    LUNG CANCER SCREENING  Discontinued                                                                                                                                                CMS Preventative Services Quick Reference  Risk Factors Identified During Encounter  Immunizations Discussed/Encouraged: Prevnar 20 (Pneumococcal 20-valent conjugate)    The above risks/problems have been discussed with the patient.  Pertinent information has been shared with the patient in the After Visit Summary.  An After Visit Summary and PPPS were made available to the patient.    Follow Up:  Next Medicare Wellness visit to be scheduled in 1 year.         Additional E&M Note during same encounter follows:  Patient has additional, significant, and separately identifiable condition(s)/problem(s) that require work above and beyond the Medicare Wellness Visit     Chief Complaint  Medicare Wellness-subsequent, Atrial Fibrillation, Cerebrovascular Accident, and COPD      Presents for follow-up of CVA, A-fib, and COPD.  He states he is feeling better from the CVA with exception of feeling tired and not having the energy he used to have.  He denies  "weakness, speech difficulties, vision changes.  He saw cardiology.  Note reviewed.  His daughter states cardiology does not want his heart rate to get over 110 so he is having trouble with exercising.  He states the inhalers are helping with the shortness of breath and wheezing.      Anshul is also being seen today for additional medical problem/s.    Review of Systems   Constitutional:  Positive for fatigue. Negative for fever.   Respiratory:  Negative for cough, shortness of breath and wheezing.    Cardiovascular:  Negative for chest pain and palpitations.   Gastrointestinal:  Negative for blood in stool, diarrhea, nausea and vomiting.   Genitourinary:  Negative for dysuria and hematuria.   Neurological:  Negative for speech difficulty and weakness.              Objective   Vital Signs:  /60 (BP Location: Right arm, Patient Position: Sitting, Cuff Size: Large Adult)   Pulse 74   Temp 97.3 °F (36.3 °C) (Tympanic)   Resp 16   Ht 195.6 cm (77.01\")   Wt 109 kg (240 lb)   SpO2 96%   BMI 28.45 kg/m²   Physical Exam  Vitals reviewed.   Constitutional:       General: He is not in acute distress.     Appearance: He is well-developed. He is not diaphoretic.   HENT:      Head: Normocephalic and atraumatic.   Cardiovascular:      Rate and Rhythm: Normal rate and regular rhythm.      Heart sounds: Normal heart sounds. No murmur heard.     No friction rub. No gallop.   Pulmonary:      Effort: Pulmonary effort is normal. No respiratory distress.      Breath sounds: Normal breath sounds.   Abdominal:      General: Bowel sounds are normal. There is no distension.      Palpations: Abdomen is soft.      Tenderness: There is no abdominal tenderness.   Skin:     General: Skin is warm and dry.   Neurological:      Mental Status: He is alert and oriented to person, place, and time.   Psychiatric:         Behavior: Behavior normal.         Thought Content: Thought content normal.         Judgment: Judgment normal.            "      Assessment and Plan Additional age appropriate preventative wellness advice topics were discussed during today's preventative wellness exam(some topics already addressed during AWV portion of the note above):                 Medicare annual wellness visit, subsequent    Cerebrovascular accident (CVA) due to embolism of left middle cerebral artery    Simple chronic bronchitis    Paroxysmal atrial fibrillation    No orders of the defined types were placed in this encounter.    Plan: Continue current meds.  Follow cardiology's activity recommendations.  He declines pneumonia vaccine.  Follow-up in 3 months and as needed.      Follow Up   Return in about 3 months (around 2/7/2025).  Patient was given instructions and counseling regarding his condition or for health maintenance advice. Please see specific information pulled into the AVS if appropriate.      This document electronically signed by Adali MCQUEEN. November 7, 2024 13:31 EST

## 2024-11-08 ENCOUNTER — TELEPHONE (OUTPATIENT)
Dept: FAMILY MEDICINE CLINIC | Facility: CLINIC | Age: 69
End: 2024-11-08

## 2024-11-08 NOTE — TELEPHONE ENCOUNTER
Caller: Anshul Andrews    Relationship to patient: Self    Best call back number: [phone]     PATIENT IS CALLING TO STATE THAT MICHELLE STARKS IS FAXING OVER A FORM FOR HIS DISABILITY AND WANTED TO MAKE SURE IT WA RECEIVED.

## 2024-11-19 ENCOUNTER — HOSPITAL ENCOUNTER (OUTPATIENT)
Dept: CARDIOLOGY | Facility: HOSPITAL | Age: 69
Discharge: HOME | End: 2024-11-19
Admitting: PHYSICIAN ASSISTANT
Payer: MEDICARE

## 2024-11-19 VITALS
OXYGEN SATURATION: 96 % | DIASTOLIC BLOOD PRESSURE: 62 MMHG | SYSTOLIC BLOOD PRESSURE: 125 MMHG | WEIGHT: 239.8 LBS | HEART RATE: 97 BPM | BODY MASS INDEX: 28.31 KG/M2 | HEIGHT: 77 IN

## 2024-11-19 DIAGNOSIS — I48.0 PAROXYSMAL ATRIAL FIBRILLATION: ICD-10-CM

## 2024-11-19 DIAGNOSIS — I50.22 CHRONIC HFREF (HEART FAILURE WITH REDUCED EJECTION FRACTION): Primary | ICD-10-CM

## 2024-11-19 DIAGNOSIS — Z79.01 CHRONIC ANTICOAGULATION: ICD-10-CM

## 2024-11-19 LAB
ABSOLUTE LUNG FLUID CONTENT: 28 % (ref 20–35)
ANION GAP SERPL CALCULATED.3IONS-SCNC: 13.1 MMOL/L (ref 5–15)
BUN SERPL-MCNC: 15 MG/DL (ref 8–23)
BUN/CREAT SERPL: 12.7 (ref 7–25)
CALCIUM SPEC-SCNC: 9.5 MG/DL (ref 8.6–10.5)
CHLORIDE SERPL-SCNC: 104 MMOL/L (ref 98–107)
CO2 SERPL-SCNC: 22.9 MMOL/L (ref 22–29)
CREAT SERPL-MCNC: 1.18 MG/DL (ref 0.76–1.27)
EGFRCR SERPLBLD CKD-EPI 2021: 67.2 ML/MIN/1.73
GLUCOSE SERPL-MCNC: 118 MG/DL (ref 65–99)
MAGNESIUM SERPL-MCNC: 2.1 MG/DL (ref 1.6–2.4)
NT-PROBNP SERPL-MCNC: 814 PG/ML (ref 0–900)
POTASSIUM SERPL-SCNC: 4.1 MMOL/L (ref 3.5–5.2)
SODIUM SERPL-SCNC: 140 MMOL/L (ref 136–145)

## 2024-11-19 PROCEDURE — 94726 PLETHYSMOGRAPHY LUNG VOLUMES: CPT | Performed by: PHYSICIAN ASSISTANT

## 2024-11-19 PROCEDURE — 99215 OFFICE O/P EST HI 40 MIN: CPT | Performed by: PHYSICIAN ASSISTANT

## 2024-11-19 PROCEDURE — 80048 BASIC METABOLIC PNL TOTAL CA: CPT | Performed by: PHYSICIAN ASSISTANT

## 2024-11-19 PROCEDURE — 83880 ASSAY OF NATRIURETIC PEPTIDE: CPT | Performed by: PHYSICIAN ASSISTANT

## 2024-11-19 PROCEDURE — G2211 COMPLEX E/M VISIT ADD ON: HCPCS | Performed by: PHYSICIAN ASSISTANT

## 2024-11-19 PROCEDURE — 83735 ASSAY OF MAGNESIUM: CPT | Performed by: PHYSICIAN ASSISTANT

## 2024-11-19 PROCEDURE — 36415 COLL VENOUS BLD VENIPUNCTURE: CPT | Performed by: PHYSICIAN ASSISTANT

## 2024-11-19 NOTE — PROGRESS NOTES
[facility] Failure [facility]  Pharmacist Note     Anshul Andrews is a 69 y.o. male seen in the Heart Failure Clinic for HFrEF with last EF 30-35% on 9/15/24.  Anshul Andrews reports a fair understanding of medications and is accompanied by his daughter who assists him with his medications. He denies any SOB or swelling today and reports his weight is down.  He denies needing any doses of his as needed furosemide.       His daughter reports she has signed him up for Medicare Part D and it will be effective Jan 1.       Medication Use:   Hx of med intolerances:  None related to HF  Retail Rx Management: [facility] [place], Needs to transfer meds here in January with [facility].       Past Medical History:   Diagnosis Date    Atrial fibrillation     Diabetes mellitus      ALLERGIES: Patient has no known allergies.  Current Outpatient Medications   Medication Sig Dispense Refill    albuterol sulfate  (90 Base) MCG/ACT inhaler Inhale 2 puffs Every 4 (Four) Hours As Needed for Wheezing. 8 g 2    apixaban (ELIQUIS) 5 MG tablet tablet Take 1 tablet by mouth 2 (Two) Times a Day. 56 tablet 0    atorvastatin (LIPITOR) 40 MG tablet Take 1 tablet by mouth Daily for 90 days. 90 tablet 0    clopidogrel (Plavix) 75 MG tablet Take 1 tablet by mouth Daily. 30 tablet 6    empagliflozin (JARDIANCE) 10 MG tablet tablet Take 1 tablet by mouth Daily for 30 days. 30 tablet 3    Fluticasone-Salmeterol (Wixela Inhub) 250-50 MCG/ACT DISKUS Inhale 1 puff 2 (Two) Times a Day. 60 each 5    furosemide (Lasix) 20 MG tablet Take 1 tablet by mouth Daily As Needed (Leg swelling). 30 tablet 0    metoprolol succinate XL (TOPROL-XL) 100 MG 24 hr tablet Take 1 tablet by mouth Daily. 60 tablet 3    sacubitril-valsartan (ENTRESTO) 24-26 MG tablet Take 1 tablet by mouth 2 (Two) Times a Day. 60 tablet 3    spironolactone (ALDACTONE) 25 MG tablet Take 0.5 tablets by mouth Daily for 30 days. 15 tablet 0    Vericiguat (Verquvo) 5 MG tablet Take 1 tablet by mouth Daily for 14  "days. 14 tablet 0     No current facility-administered medications for this encounter.       Vaccination History:   Pneumonia: Pt reports UTD and that he goes through PCP   Annual Influenza: [id number] received on 10/3/24  Shingles: Declines 11/19/24    Objective  Vitals:    11/19/24 0959   BP: 125/62   BP Location: Left arm   Patient Position: Sitting   Cuff Size: Adult   Pulse: 97   SpO2: 96%   Weight: 109 kg (239 lb 12.8 oz)   Height: 195.6 cm (77\")     Wt Readings from Last 3 Encounters:   11/19/24 109 kg (239 lb 12.8 oz)   11/07/24 109 kg (240 lb)   10/28/24 111 kg (243 lb 12.8 oz)         11/19/24  0959   Weight: 109 kg (239 lb 12.8 oz)     Lab Results   Component Value Date    GLUCOSE 122 (H) 10/28/2024    BUN 16 10/28/2024    CREATININE 1.28 (H) 10/28/2024    BCR 12.5 10/28/2024    K 4.3 10/28/2024    CO2 23.0 10/28/2024    CALCIUM 9.8 10/28/2024    ALBUMIN 3.8 09/25/2024    AST 34 09/25/2024    ALT 44 (H) 09/25/2024     Lab Results   Component Value Date    WBC 13.42 (H) 09/25/2024    HGB 17.0 09/25/2024    HCT 54.1 (H) 09/25/2024    MCV 85.5 09/25/2024     09/25/2024     No results found for: \"CKTOTAL\", \"CKMB\", \"CKMBINDEX\", \"TROPONINI\", \"TROPONINT\"  Lab Results   Component Value Date    PROBNP 444.9 10/28/2024            GDMT    Drug Class   Drug   Dose Last Dose Adjustment Additional Titration   Notes   ACEi/ARB/ARNI Entresto  24/26 mg 10/17/24     Beta Blocker Toprol  mg  10/17/24     MRA Spironolactone 12.5 mg 10/17/24     SGLT2i Jardiance  10 mg  10/2/24 N/A        Drug Therapy Problems    Medication Access Issues  Medication Adverse Effects (Verquvo)    Recommendations:     Daughter reports his insurance will be in effect Jan 1. We will sample high cost drugs until then.  Stephanie to enroll in [facility] after Jan 1 and Rxs will need to be sent here.  I have reached out to Kristen at cardiology office to request Eliquis samples for him.   Mayte to decrease Verquvo dose back to 2.5mg     Patient " was educated on heart failure medications and the importance of medication adherence. All questions were addressed and patient expressed  understanding.     Thank you for allowing me to participate in the care of your patient,    Breanne Payton, PharmD  11/19/24  10:28 EST

## 2024-11-19 NOTE — PROGRESS NOTES
[facility]  JOCELYN Dacosta Brandy Jean, APRN  [address] [place] [id number]    Thank you for asking me to see Anshul Andrews for congestive heart failure.    HPI:     This is a 69 y.o. male with known past medical history of:    HFrEF  SHARYN from 09/17/24 with EF around 30%; severe left atrial enlargement. Severe global hypokinetic left atrial appendage  Paroxysmal atrial fibrillation  Right MCA stroke  DM   Essential HTN  Polycythemia  Hx of cardiac thrombi  Tobacco abuse    Anshul Andrews presents for today for Heart Failure clinic evaluation.  The patient is typically seen by Rosaura Guzman APRN.  Patient's primary cardiologist is Dr. Martines.     Last known hospitalization and/or ED visit: Patient was initially hospitalized at the [facility] with right MCA stroke during which time he was found to have new onset atrial fibrillation as well as HFrEF with ejection fraction around 30%  Accompanied by: Daughter          11/19/2024 visit data/details regarding:   Dyspnea: Denies dyspnea on exertion  Lower extremity swelling: Denies swelling of lower extremities  Abdominal swelling: Has abdominal distention  Home weight: Weight monitoring booklet provided during initial visit; provided an initial visit  Home BP: BP monitoring booklet provided during initial visit; pressure cuff provided in initial visit  Home heart rate: HR monitoring booklet provided during initial visit;  Daily activities of living: Performing independently  Pillows/lying flat: 2 pillows  HF zone: Green  Mr. Andrews is chest pain free and doing well. He had increased nausea with the increased dose in Verquevo to 5mg from 2.5mg; however, he has not really been eating much with his medications.  We discuss increasing food intake with his medications.          Review of Systems - Review of Systems   Constitutional: Negative for decreased appetite, diaphoresis and fever.    HENT:  Negative for congestion and ear pain.    Eyes:  Negative for blurred vision.   Cardiovascular:  Negative for chest pain and dyspnea on exertion.   Respiratory:  Negative for cough and shortness of breath.    Endocrine: Negative for cold intolerance and polydipsia.   Hematologic/Lymphatic: Negative for adenopathy and bleeding problem.   Skin:  Negative for dry skin and nail changes.   Musculoskeletal:  Negative for arthritis and falls.   Gastrointestinal:  Positive for nausea. Negative for bloating and anorexia.   Genitourinary:  Negative for bladder incontinence and dysuria.   Neurological:  Negative for brief paralysis and difficulty with concentration.   Psychiatric/Behavioral:  Negative for altered mental status and hallucinations.          All other systems were reviewed and were negative.    Patient Active Problem List   Diagnosis    CVA (cerebral vascular accident)    Cardiac arrhythmia    Simple chronic bronchitis    Prediabetes    Atrial fibrillation       family history includes Heart attack in his father and paternal uncle.     reports that he has quit smoking. His smoking use included cigarettes. He started smoking about 24 years ago. He has a 24.9 pack-year smoking history. He has never used smokeless tobacco. He reports that he does not drink alcohol and does not use drugs.    No Known Allergies      Current Outpatient Medications:     albuterol sulfate  (90 Base) MCG/ACT inhaler, Inhale 2 puffs Every 4 (Four) Hours As Needed for Wheezing., Disp: 8 g, Rfl: 2    apixaban (ELIQUIS) 5 MG tablet tablet, Take 1 tablet by mouth 2 (Two) Times a Day., Disp: 56 tablet, Rfl: 0    atorvastatin (LIPITOR) 40 MG tablet, Take 1 tablet by mouth Daily for 90 days., Disp: 90 tablet, Rfl: 0    clopidogrel (Plavix) 75 MG tablet, Take 1 tablet by mouth Daily., Disp: 30 tablet, Rfl: 6    empagliflozin (JARDIANCE) 10 MG tablet tablet, Take 1 tablet by mouth Daily for 30 days., Disp: 30 tablet, Rfl: 3     Fluticasone-Salmeterol (Wixela Inhub) 250-50 MCG/ACT DISKUS, Inhale 1 puff 2 (Two) Times a Day., Disp: 60 each, Rfl: 5    furosemide (Lasix) 20 MG tablet, Take 1 tablet by mouth Daily As Needed (Leg swelling)., Disp: 30 tablet, Rfl: 0    metoprolol succinate XL (TOPROL-XL) 100 MG 24 hr tablet, Take 1 tablet by mouth Daily., Disp: 60 tablet, Rfl: 3    spironolactone (ALDACTONE) 25 MG tablet, Take 0.5 tablets by mouth Daily for 30 days., Disp: 15 tablet, Rfl: 0    sacubitril-valsartan (ENTRESTO) 49-51 MG tablet, Take 0.5 tablets by mouth 2 (Two) Times a Day for 42 days., Disp: 42 tablet, Rfl: 0    Vericiguat 2.5 MG tablet, Take 1 tablet by mouth Daily., Disp: 42 tablet, Rfl: 0      Physical Exam:  I have reviewed the patient's current vital signs as documented in the patient's EMR.   Vitals:    11/19/24 0959   BP: 125/62   Pulse: 97   SpO2: 96%       Body mass index is 28.44 kg/m².       11/19/24  0959   Weight: 109 kg (239 lb 12.8 oz)        Physical Exam  Vitals and nursing note reviewed.   Constitutional:       General: He is awake.      Appearance: Normal appearance. He is well-developed and well-groomed.   HENT:      Head: Normocephalic and atraumatic.   Eyes:      General: Lids are normal.      Conjunctiva/sclera:      Right eye: Right conjunctiva is not injected.      Left eye: Left conjunctiva is not injected.   Cardiovascular:      Rate and Rhythm: Normal rate. Rhythm irregularly irregular.      Heart sounds:      No systolic murmur is present.      No diastolic murmur is present.   Pulmonary:      Effort: No tachypnea or bradypnea.      Breath sounds: No decreased breath sounds, wheezing or rhonchi.   Abdominal:      General: Bowel sounds are normal.      Palpations: Abdomen is soft.      Tenderness: There is no abdominal tenderness.   Musculoskeletal:      Right lower leg: No edema.      Left lower leg: No edema.   Neurological:      Mental Status: He is alert and oriented to person, place, and time.  "  Psychiatric:         Attention and Perception: Attention normal.         Mood and Affect: Mood normal.         Behavior: Behavior is cooperative.          JVP: Volume/Pulsation: Normal.        DATA REVIEWED:     ---------------------------------------------------  TTE/SHARYN:        LAST HEART CATH RESULT/IF AVAILABLE:     No results found for this or any previous visit.      -----------------------------------------------------  CXR/Imaging:   Imaging Results (Most Recent)       None            I personally reviewed and interpreted the CXR.      -----------------------------------------------------  CT:   No radiology results for the last 30 days.  I personally reviewed the images of the CT scan.  My personal interpretation is below.      ----------------------------------------------------      --------------------------------------------------------------------------------------------------    Laboratory evaluations:    Lab Results   Component Value Date    GLUCOSE 118 (H) 11/19/2024    BUN 15 11/19/2024    CREATININE 1.18 11/19/2024    BCR 12.7 11/19/2024    K 4.1 11/19/2024    CO2 22.9 11/19/2024    CALCIUM 9.5 11/19/2024    ALBUMIN 3.8 09/25/2024    AST 34 09/25/2024    ALT 44 (H) 09/25/2024     Lab Results   Component Value Date    WBC 13.42 (H) 09/25/2024    HGB 17.0 09/25/2024    HCT 54.1 (H) 09/25/2024    MCV 85.5 09/25/2024     09/25/2024     No results found for: \"CHOL\", \"CHLPL\", \"TRIG\", \"HDL\", \"LDL\", \"LDLDIRECT\"  No results found for: \"TSH\", \"V0JQBQP\", \"P6AWJDJ\", \"THYROIDAB\"  Lab Results   Component Value Date    HGBA1C 6.20 (H) 09/25/2024     Lab Results   Component Value Date    ALT 44 (H) 09/25/2024     Lab Results   Component Value Date    HGBA1C 6.20 (H) 09/25/2024     Lab Results   Component Value Date    CREATININE 1.18 11/19/2024     No results found for: \"IRON\", \"TIBC\", \"FERRITIN\"  Lab Results   Component Value Date    INR 1.1 09/13/2024    PROTIME 11.4 09/13/2024        Lab Results "   Component Value Date    ABSOLUTELUNG 28 11/19/2024    ABSOLUTELUNG 27 10/28/2024           1. Chronic HFrEF (heart failure with reduced ejection fraction)    2. Paroxysmal atrial fibrillation    3. Chronic anticoagulation          ORDERS PLACED TODAY:  Orders Placed This Encounter   Procedures    ReDs Vest    Basic Metabolic Panel    Magnesium    proBNP    Basic Metabolic Panel    Magnesium    proBNP    Adult Transthoracic Echo Limited W/ Cont if Necessary Per Protocol        Diagnoses and all orders for this visit:    1. Chronic HFrEF (heart failure with reduced ejection fraction) (Primary)  -     Basic Metabolic Panel; Future  -     Magnesium; Future  -     proBNP; Future  -     Basic Metabolic Panel; Standing  -     Basic Metabolic Panel  -     Magnesium; Standing  -     Magnesium  -     proBNP; Standing  -     proBNP  -     ReDs Vest  -     Adult Transthoracic Echo Limited W/ Cont if Necessary Per Protocol; Future    2. Paroxysmal atrial fibrillation    3. Chronic anticoagulation    Other orders  -     Vericiguat 2.5 MG tablet; Take 1 tablet by mouth Daily.  Dispense: 42 tablet; Refill: 0  -     sacubitril-valsartan (ENTRESTO) 49-51 MG tablet; Take 0.5 tablets by mouth 2 (Two) Times a Day for 42 days.  Dispense: 42 tablet; Refill: 0             MEDS ORDERED TODAY:    New Medications Ordered This Visit   Medications    Vericiguat 2.5 MG tablet     Sig: Take 1 tablet by mouth Daily.     Dispense:  42 tablet     Refill:  0    sacubitril-valsartan (ENTRESTO) 49-51 MG tablet     Sig: Take 0.5 tablets by mouth 2 (Two) Times a Day for 42 days.     Dispense:  42 tablet     Refill:  0        ---------------------------------------------------------------------------------------------------------------------------          ASSESSMENT/PLAN:      Diagnosis Plan   1. Chronic HFrEF (heart failure with reduced ejection fraction)  Basic Metabolic Panel    Magnesium    proBNP    Basic Metabolic Panel    Basic Metabolic Panel     Magnesium    Magnesium    proBNP    proBNP    ReDs Vest    Adult Transthoracic Echo Limited W/ Cont if Necessary Per Protocol      2. Paroxysmal atrial fibrillation        3. Chronic anticoagulation              NYHA stage Stage C: Structural heart disease is present AND symptoms have occurredFC-Class II: Slight limitation of physical activity. Comfortable at rest Ordinary physical activity results in fatigue, palpitation, dyspnea (shortness of breath).     Today, Patient appears euvolemic.and with  Moderate perfusion. The patient's hemodynamics are currently acceptable. HR is: normal and is at goal. BP/MAP was reviewed and there isroom for medication up-titration.  Clinical trajectory was assessed and haswaxed and waned.     CHF GOAL DIRECTED MEDICAL THERAPY FOR PATIENT ADDRESSED/ADJUSTED:     GDMT: HFrEF    Drug Class   Drug   Dose Last Dose Adjustment Notes   ACEi/ARB/ARNI Entresto 24-26mg BID     Beta Blocker Toprol XL  100mg     MRA Spironolactone 12.5mg qd     SGLT2i Jardiance  10mg  N/A   Secondaries if applicable:  Verquevo 2.5       -CHF Specific BB:    Metoprolol Succinate  at dose of 100mg.   We discussed processes/benefits of HF clinic including nursing, pharmacist, and provider evaluation during each visit with ability for in office ReDS vest, labs, and ability to provide IV diuresis in the clinic with close outpatient monitoring.  Additionally, patient was educated about the availability of delivery of medications to patient's clinic room prior to leaving the building which assists with medication compliance and insures medications are in hands when changes are made (if patient opts for apothecary usage) with thorough guidance regarding changes and medication schedule provided.          -ACE/ARB/ARNi:   Continue current Entresto dose.     -MRA:   Continue Aldactone dose.     -SGLT2 inhibitor therapy:   Continue Jardiance 10mg.      -Diuretic regimen:   ReDS Vest reading for. 11/19/24 is  28; ReDs  Vest reading reviewed with patient.    Continue daily MRA with PRN Lasix (has not required dose recently)   BMP, Mag, & ProBNP reviewed with patient.      -Fluid restriction/Sodium restriction:   Requested 2000 ml restriction  Patient has been asked to weigh daily and was provided with a printed diuretic strategy.  1,500 mg Na restriction was discussed.    Secondary pharmacological therapy in HFreF:   EF is less than 45% @ 30%.   Maximized on GDMT as tolerated thus far including Spironolactone, Jardiance, beta blocker, Entresto.   Recent hospitalization or IV diuresis includes 09/2024  Given HFrEF with optimally treated with primary therapies for HFrEF and vasodilator therapy, will consider addition of Verquevo if approved by insurance.     -Acute and/or Chronic underlying conditions other than HF addressed during visit:   Paroxysmal atrial fibrillation  Chronic anticoagulation:   Hx MCA CVA:   Continue Eliquis 5mg BID.   Continue f/u with [facility].     Identifiable barriers to Heart Failure Self-care:   Medical Barriers:  No immediate known barriers  Social Barriers: Financial Nathrop of HF treatment      --------------------------------------------------------------------------------                   >45 minutes out of 60 minutes face to face spent counseling patient extensively on dietary Na+ intake, importance of activity, weight monitoring, compliance with medications in addition to importance of titration with goal directed medical therapy and follow up appointments.            This document has been electronically signed by Mayte Merino PA-C  November 19, 2024 13:03 EST      Dictated Utilizing Dragon Dictation: Part of this note may be an electronic transcription/translation of spoken language to printed text using the Dragon Dictation System.    Follow-up appointment and medication changes provided in hand delivered After Visit Summary as well as reviewed in the room.

## 2024-11-19 NOTE — PROGRESS NOTES
[facility]    Date: 11/19/24     Vitals:    11/19/24 0959   BP: 125/62   Pulse: 97   SpO2: 96%      Weight 239.8  Method of arrival: Ambulatory    Weighing self daily: Yes    Monitoring Heart Failure Zones: Yes    Today's HF Zone: Green    Taking medications as prescribed: Yes    Edema No    Shortness of Air: No    Number of pillows used at night:<2    Educational Materials given:  avs                                                                         ReDS Value: 28  25-35 Optimal Value Status      Shikha Velasquez RN 11/19/24 10:03 EST

## 2024-12-05 ENCOUNTER — TELEPHONE (OUTPATIENT)
Dept: FAMILY MEDICINE CLINIC | Facility: CLINIC | Age: 69
End: 2024-12-05

## 2024-12-05 NOTE — TELEPHONE ENCOUNTER
Caller: Anshul Andrews    Relationship: Self    Best call back number: [phone]     What form or medical record are you requesting: OFFICE NOTES FROM LAST VISIT    Who is requesting this form or medical record from you: MICHELLE    How would you like to receive the form or medical records (pick-up, mail, fax): FAX  If fax, what is the fax number: [phone]  If mail, what is the address:   If pick-up, provide patient with address and location details    Timeframe paperwork needed:     Additional notes:

## 2024-12-09 DIAGNOSIS — I63.412 CEREBROVASCULAR ACCIDENT (CVA) DUE TO EMBOLISM OF LEFT MIDDLE CEREBRAL ARTERY: ICD-10-CM

## 2024-12-09 DIAGNOSIS — I48.19 ATRIAL FIBRILLATION, PERSISTENT: ICD-10-CM

## 2024-12-09 DIAGNOSIS — I50.21 ACUTE HFREF (HEART FAILURE WITH REDUCED EJECTION FRACTION): ICD-10-CM

## 2024-12-09 RX ORDER — SPIRONOLACTONE 25 MG/1
12.5 TABLET ORAL DAILY
Qty: 15 TABLET | Refills: 0 | Status: SHIPPED | OUTPATIENT
Start: 2024-12-09

## 2024-12-12 NOTE — TELEPHONE ENCOUNTER
Caller: Darryl Anshul    Relationship: Self    Best call back number: [phone]     What is the best time to reach you: ANY    Who are you requesting to speak with (clinical staff, provider,  specific staff member): CLINICAL    What was the call regarding: MICHELLE FINANCIAL NEEDS THE LAST VISIT NOTES. PLEASE FAX TO: [phone]. NOTIFY PATIENT WITH ANY ISSUES.    PATIENT ASKING IF HIS SON CAN  A COPY OF THE LAST VISIT NOTES. STATES HIS SON IS POA.  PLEASE CALL TO ADVISE

## 2024-12-13 ENCOUNTER — HOSPITAL ENCOUNTER (EMERGENCY)
Facility: HOSPITAL | Age: 69
Discharge: HOME | End: 2024-12-13
Attending: EMERGENCY MEDICINE
Payer: MEDICARE

## 2024-12-13 ENCOUNTER — APPOINTMENT (OUTPATIENT)
Dept: GENERAL RADIOLOGY | Facility: HOSPITAL | Age: 69
End: 2024-12-13
Payer: MEDICARE

## 2024-12-13 ENCOUNTER — TELEPHONE (OUTPATIENT)
Dept: CARDIOLOGY | Facility: CLINIC | Age: 69
End: 2024-12-13
Payer: MEDICARE

## 2024-12-13 VITALS
OXYGEN SATURATION: 99 % | HEART RATE: 87 BPM | BODY MASS INDEX: 27.42 KG/M2 | TEMPERATURE: 97.5 F | RESPIRATION RATE: 14 BRPM | DIASTOLIC BLOOD PRESSURE: 73 MMHG | HEIGHT: 78 IN | SYSTOLIC BLOOD PRESSURE: 110 MMHG | WEIGHT: 237 LBS

## 2024-12-13 DIAGNOSIS — I48.91 ATRIAL FIBRILLATION, UNSPECIFIED TYPE: Primary | ICD-10-CM

## 2024-12-13 LAB
ALBUMIN SERPL-MCNC: 4.2 G/DL (ref 3.5–5.2)
ALBUMIN/GLOB SERPL: 1.6 G/DL
ALP SERPL-CCNC: 148 U/L (ref 39–117)
ALT SERPL W P-5'-P-CCNC: 32 U/L (ref 1–41)
ANION GAP SERPL CALCULATED.3IONS-SCNC: 12 MMOL/L (ref 5–15)
APTT PPP: 35.6 SECONDS (ref 26.5–34.5)
AST SERPL-CCNC: 20 U/L (ref 1–40)
BASOPHILS # BLD AUTO: 0.07 10*3/MM3 (ref 0–0.2)
BASOPHILS NFR BLD AUTO: 0.6 % (ref 0–1.5)
BILIRUB SERPL-MCNC: 0.5 MG/DL (ref 0–1.2)
BILIRUB UR QL STRIP: NEGATIVE
BUN SERPL-MCNC: 18 MG/DL (ref 8–23)
BUN/CREAT SERPL: 14.8 (ref 7–25)
CALCIUM SPEC-SCNC: 9.5 MG/DL (ref 8.6–10.5)
CHLORIDE SERPL-SCNC: 104 MMOL/L (ref 98–107)
CLARITY UR: CLEAR
CO2 SERPL-SCNC: 25 MMOL/L (ref 22–29)
COLOR UR: YELLOW
CREAT SERPL-MCNC: 1.22 MG/DL (ref 0.76–1.27)
CRP SERPL-MCNC: <0.3 MG/DL (ref 0–0.5)
DEPRECATED RDW RBC AUTO: 51.7 FL (ref 37–54)
EGFRCR SERPLBLD CKD-EPI 2021: 64.6 ML/MIN/1.73
EOSINOPHIL # BLD AUTO: 0.07 10*3/MM3 (ref 0–0.4)
EOSINOPHIL NFR BLD AUTO: 0.6 % (ref 0.3–6.2)
ERYTHROCYTE [DISTWIDTH] IN BLOOD BY AUTOMATED COUNT: 17.4 % (ref 12.3–15.4)
ERYTHROCYTE [SEDIMENTATION RATE] IN BLOOD: <1 MM/HR (ref 0–20)
GEN 5 1HR TROPONIN T REFLEX: 16 NG/L
GLOBULIN UR ELPH-MCNC: 2.6 GM/DL
GLUCOSE SERPL-MCNC: 136 MG/DL (ref 65–99)
GLUCOSE UR STRIP-MCNC: ABNORMAL MG/DL
HCT VFR BLD AUTO: 56.4 % (ref 37.5–51)
HGB BLD-MCNC: 17.9 G/DL (ref 13–17.7)
HGB UR QL STRIP.AUTO: NEGATIVE
HOLD SPECIMEN: NORMAL
HOLD SPECIMEN: NORMAL
IMM GRANULOCYTES # BLD AUTO: 0.09 10*3/MM3 (ref 0–0.05)
IMM GRANULOCYTES NFR BLD AUTO: 0.7 % (ref 0–0.5)
INR PPP: 1.28 (ref 0.9–1.1)
KETONES UR QL STRIP: NEGATIVE
LEUKOCYTE ESTERASE UR QL STRIP.AUTO: NEGATIVE
LYMPHOCYTES # BLD AUTO: 1.84 10*3/MM3 (ref 0.7–3.1)
LYMPHOCYTES NFR BLD AUTO: 14.5 % (ref 19.6–45.3)
MAGNESIUM SERPL-MCNC: 2.3 MG/DL (ref 1.6–2.4)
MCH RBC QN AUTO: 27.5 PG (ref 26.6–33)
MCHC RBC AUTO-ENTMCNC: 31.7 G/DL (ref 31.5–35.7)
MCV RBC AUTO: 86.5 FL (ref 79–97)
MONOCYTES # BLD AUTO: 0.8 10*3/MM3 (ref 0.1–0.9)
MONOCYTES NFR BLD AUTO: 6.3 % (ref 5–12)
NEUTROPHILS NFR BLD AUTO: 77.3 % (ref 42.7–76)
NEUTROPHILS NFR BLD AUTO: 9.84 10*3/MM3 (ref 1.7–7)
NITRITE UR QL STRIP: NEGATIVE
NRBC BLD AUTO-RTO: 0 /100 WBC (ref 0–0.2)
NT-PROBNP SERPL-MCNC: 684 PG/ML (ref 0–900)
PH UR STRIP.AUTO: 5.5 [PH] (ref 5–8)
PLATELET # BLD AUTO: 295 10*3/MM3 (ref 140–450)
PMV BLD AUTO: 10.4 FL (ref 6–12)
POTASSIUM SERPL-SCNC: 4.5 MMOL/L (ref 3.5–5.2)
PROT SERPL-MCNC: 6.8 G/DL (ref 6–8.5)
PROT UR QL STRIP: NEGATIVE
PROTHROMBIN TIME: 16.1 SECONDS (ref 12.1–14.7)
QT INTERVAL: 366 MS
QTC INTERVAL: 472 MS
RBC # BLD AUTO: 6.52 10*6/MM3 (ref 4.14–5.8)
SODIUM SERPL-SCNC: 141 MMOL/L (ref 136–145)
SP GR UR STRIP: 1.02 (ref 1–1.03)
T4 FREE SERPL-MCNC: 1.28 NG/DL (ref 0.92–1.68)
TROPONIN T % DELTA: -27 %
TROPONIN T NUMERIC DELTA: -6 NG/L
TROPONIN T SERPL HS-MCNC: 22 NG/L
TSH SERPL DL<=0.05 MIU/L-ACNC: 1.77 UIU/ML (ref 0.27–4.2)
UROBILINOGEN UR QL STRIP: ABNORMAL
WBC NRBC COR # BLD AUTO: 12.71 10*3/MM3 (ref 3.4–10.8)
WHOLE BLOOD HOLD COAG: NORMAL
WHOLE BLOOD HOLD SPECIMEN: NORMAL

## 2024-12-13 PROCEDURE — 85730 THROMBOPLASTIN TIME PARTIAL: CPT | Performed by: EMERGENCY MEDICINE

## 2024-12-13 PROCEDURE — 86140 C-REACTIVE PROTEIN: CPT | Performed by: EMERGENCY MEDICINE

## 2024-12-13 PROCEDURE — 25810000003 SODIUM CHLORIDE 0.9 % SOLUTION: Performed by: EMERGENCY MEDICINE

## 2024-12-13 PROCEDURE — 85025 COMPLETE CBC W/AUTO DIFF WBC: CPT | Performed by: EMERGENCY MEDICINE

## 2024-12-13 PROCEDURE — 85652 RBC SED RATE AUTOMATED: CPT | Performed by: EMERGENCY MEDICINE

## 2024-12-13 PROCEDURE — 71045 X-RAY EXAM CHEST 1 VIEW: CPT | Performed by: RADIOLOGY

## 2024-12-13 PROCEDURE — 85610 PROTHROMBIN TIME: CPT | Performed by: EMERGENCY MEDICINE

## 2024-12-13 PROCEDURE — 84484 ASSAY OF TROPONIN QUANT: CPT | Performed by: EMERGENCY MEDICINE

## 2024-12-13 PROCEDURE — 84439 ASSAY OF FREE THYROXINE: CPT | Performed by: EMERGENCY MEDICINE

## 2024-12-13 PROCEDURE — 83880 ASSAY OF NATRIURETIC PEPTIDE: CPT | Performed by: EMERGENCY MEDICINE

## 2024-12-13 PROCEDURE — 81003 URINALYSIS AUTO W/O SCOPE: CPT | Performed by: EMERGENCY MEDICINE

## 2024-12-13 PROCEDURE — 80053 COMPREHEN METABOLIC PANEL: CPT | Performed by: EMERGENCY MEDICINE

## 2024-12-13 PROCEDURE — 99284 EMERGENCY DEPT VISIT MOD MDM: CPT

## 2024-12-13 PROCEDURE — 84443 ASSAY THYROID STIM HORMONE: CPT | Performed by: EMERGENCY MEDICINE

## 2024-12-13 PROCEDURE — 36415 COLL VENOUS BLD VENIPUNCTURE: CPT

## 2024-12-13 PROCEDURE — 93010 ELECTROCARDIOGRAM REPORT: CPT | Performed by: SPECIALIST

## 2024-12-13 PROCEDURE — 93005 ELECTROCARDIOGRAM TRACING: CPT | Performed by: EMERGENCY MEDICINE

## 2024-12-13 PROCEDURE — 71045 X-RAY EXAM CHEST 1 VIEW: CPT

## 2024-12-13 PROCEDURE — 83735 ASSAY OF MAGNESIUM: CPT | Performed by: EMERGENCY MEDICINE

## 2024-12-13 RX ORDER — SODIUM CHLORIDE 0.9 % (FLUSH) 0.9 %
10 SYRINGE (ML) INJECTION AS NEEDED
Status: DISCONTINUED | OUTPATIENT
Start: 2024-12-13 | End: 2024-12-13 | Stop reason: HOSPADM

## 2024-12-13 RX ADMIN — SODIUM CHLORIDE 1000 ML: 9 INJECTION, SOLUTION INTRAVENOUS at 12:05

## 2024-12-13 NOTE — TELEPHONE ENCOUNTER
Caller: Anshul Andrews    Relationship to patient: Self    Best call back number: [phone]    Chief complaint: HEART RATE HAS BEEN IN THE 140S CONSISTENTLY FOR OVER A WEEK NOW. PT IS LOSING WEIGHT STEADILY, EATING FINE AND NOT EXERCISING. HAS LOST AROUND 40 LBS IN 2 MONTHS.     Patient directed to call 911 or go to their nearest emergency room.     Patient verbalized understanding: [x] Yes  [] No  If no, why?

## 2024-12-13 NOTE — ED NOTES
Patient has been made aware that we need a urine sample as soon as possible. Patient verbalized understanding and has a urinal at bedside.

## 2024-12-17 NOTE — ED PROVIDER NOTES
Subjective     History provided by:  Patient   used: No    Shortness of Breath  Severity:  Mild  Onset quality:  Gradual  Duration:  1 day  Timing:  Constant  Progression:  Worsening  Chronicity:  Chronic  Context: not activity, not animal exposure, not emotional upset, not fumes, not known allergens, not occupational exposure, not pollens, not smoke exposure, not strong odors, not URI and not weather changes    Relieved by:  Nothing  Worsened by:  Nothing  Ineffective treatments:  None tried  Associated symptoms: no abdominal pain, no chest pain, no claudication, no cough, no diaphoresis, no ear pain, no fever, no headaches, no hemoptysis, no neck pain, no PND, no rash, no sore throat, no sputum production, no syncope, no swollen glands, no vomiting and no wheezing    Risk factors: no recent alcohol use, no family hx of DVT, no hx of cancer, no hx of PE/DVT, no obesity, no oral contraceptive use, no prolonged immobilization, no recent surgery and no tobacco use        Review of Systems   Constitutional:  Negative for activity change, appetite change, chills, diaphoresis, fatigue and fever.   HENT:  Negative for congestion, ear pain and sore throat.    Eyes:  Negative for redness.   Respiratory:  Positive for shortness of breath. Negative for cough, hemoptysis, sputum production, chest tightness and wheezing.    Cardiovascular:  Negative for chest pain, palpitations, claudication, leg swelling, syncope and PND.   Gastrointestinal:  Negative for abdominal pain, diarrhea, nausea and vomiting.   Genitourinary:  Negative for dysuria and urgency.   Musculoskeletal:  Negative for arthralgias, back pain, myalgias and neck pain.   Skin:  Negative for pallor, rash and wound.   Neurological:  Negative for dizziness, speech difficulty, weakness and headaches.   Psychiatric/Behavioral:  Negative for agitation, behavioral problems, confusion and decreased concentration.    All other systems reviewed and are  negative.      Past Medical History:   Diagnosis Date    Atrial fibrillation     Diabetes mellitus        No Known Allergies    Past Surgical History:   Procedure Laterality Date    KNEE SURGERY         Family History   Problem Relation Age of Onset    Heart attack Father     Heart attack Paternal Uncle        Social History     Socioeconomic History    Marital status:    Tobacco Use    Smoking status: Former     Current packs/day: 1.00     Average packs/day: 1 pack/day for 25.0 years (25.0 ttl pk-yrs)     Types: Cigarettes     Start date: 2000    Smokeless tobacco: Never   Vaping Use    Vaping status: Never Used   Substance and Sexual Activity    Alcohol use: Never    Drug use: Never    Sexual activity: Defer           Objective   Physical Exam  Vitals and nursing note reviewed.   Constitutional:       General: He is not in acute distress.     Appearance: Normal appearance. He is well-developed. He is not toxic-appearing or diaphoretic.   HENT:      Head: Normocephalic and atraumatic.      Right Ear: External ear normal.      Left Ear: External ear normal.      Nose: Nose normal.      Mouth/Throat:      Pharynx: No oropharyngeal exudate.      Tonsils: No tonsillar exudate.   Eyes:      General: Lids are normal.      Conjunctiva/sclera: Conjunctivae normal.      Pupils: Pupils are equal, round, and reactive to light.   Neck:      Thyroid: No thyromegaly.   Cardiovascular:      Rate and Rhythm: Tachycardia present. Rhythm irregular.      Pulses: Normal pulses.      Heart sounds: Normal heart sounds, S1 normal and S2 normal.   Pulmonary:      Effort: Pulmonary effort is normal. No tachypnea or respiratory distress.      Breath sounds: Normal breath sounds. No decreased breath sounds, wheezing or rales.   Chest:      Chest wall: No tenderness.   Abdominal:      General: Bowel sounds are normal. There is no distension.      Palpations: Abdomen is soft.      Tenderness: There is no abdominal tenderness. There is  no guarding or rebound.   Musculoskeletal:         General: No tenderness or deformity. Normal range of motion.      Cervical back: Full passive range of motion without pain, normal range of motion and neck supple.   Lymphadenopathy:      Cervical: No cervical adenopathy.   Skin:     General: Skin is warm and dry.      Coloration: Skin is not pale.      Findings: No erythema or rash.   Neurological:      Mental Status: He is alert and oriented to person, place, and time.      GCS: GCS eye subscore is 4. GCS verbal subscore is 5. GCS motor subscore is 6.      Cranial Nerves: No cranial nerve deficit.      Sensory: No sensory deficit.   Psychiatric:         Speech: Speech normal.         Behavior: Behavior normal.         Thought Content: Thought content normal.         Judgment: Judgment normal.         Procedures           ED Course  ED Course as of 12/17/24 1456   Fri Dec 13, 2024   1257 XR Chest 1 View  IMPRESSION:    Unremarkable chest x-ray.   [ES]   1257 ECG 12 Lead Tachycardia  Vent. Rate : 100 BPM     Atrial Rate : 101 BPM     P-R Int :   * ms          QRS Dur :  94 ms      QT Int : 366 ms       P-R-T Axes :   *  63  -9 degrees    QTcB Int : 472 ms     Atrial fibrillation with premature ventricular or aberrantly conducted  complexes  Nonspecific ST and T wave abnormality  Abnormal ECG  No previous ECGs available      [ES]      ED Course User Index  [ES] Vin Kong MD                                                       Medical Decision Making      Final diagnoses:   Atrial fibrillation, unspecified type       ED Disposition  ED Disposition       ED Disposition   Discharge    Condition   Stable    Comment   --               Rosaura Guzman, APRN  [address] [facility] [address]  [facility] [address]  [phone]    Schedule an appointment as soon as possible for a visit in 1 day  EVALUATE         Medication List      No changes were made to your prescriptions during this visit.            Dilan  Vin Ford MD  12/17/24 8800

## 2024-12-23 ENCOUNTER — HOSPITAL ENCOUNTER (OUTPATIENT)
Dept: CARDIOLOGY | Facility: HOSPITAL | Age: 69
Discharge: HOME | End: 2024-12-23
Admitting: PHYSICIAN ASSISTANT
Payer: MEDICARE

## 2024-12-23 DIAGNOSIS — I50.22 CHRONIC HFREF (HEART FAILURE WITH REDUCED EJECTION FRACTION): ICD-10-CM

## 2024-12-23 PROCEDURE — 93308 TTE F-UP OR LMTD: CPT

## 2024-12-24 LAB
BH CV ECHO MEAS - EDV(MOD-SP4): 105 ML
BH CV ECHO MEAS - EF(MOD-SP4): 10.7 %
BH CV ECHO MEAS - ESV(MOD-SP4): 93.8 ML
BH CV ECHO MEAS - LV DIASTOLIC VOL/BSA (35-75): 43.3 CM2
BH CV ECHO MEAS - LV SYSTOLIC VOL/BSA (12-30): 38.7 CM2
BH CV ECHO MEAS - SV(MOD-SP4): 11.2 ML
BH CV ECHO MEAS - SVI(MOD-SP4): 4.6 ML/M2

## 2025-01-02 ENCOUNTER — TELEPHONE (OUTPATIENT)
Dept: CARDIOLOGY | Facility: CLINIC | Age: 70
End: 2025-01-02
Payer: MEDICARE

## 2025-01-06 ENCOUNTER — HOSPITAL ENCOUNTER (OUTPATIENT)
Dept: CARDIOLOGY | Facility: HOSPITAL | Age: 70
Discharge: HOME | End: 2025-01-06
Admitting: PHYSICIAN ASSISTANT

## 2025-01-06 VITALS
OXYGEN SATURATION: 97 % | BODY MASS INDEX: 26.86 KG/M2 | DIASTOLIC BLOOD PRESSURE: 73 MMHG | SYSTOLIC BLOOD PRESSURE: 111 MMHG | HEIGHT: 78 IN | WEIGHT: 232.2 LBS | HEART RATE: 64 BPM

## 2025-01-06 DIAGNOSIS — I50.22 CHRONIC HFREF (HEART FAILURE WITH REDUCED EJECTION FRACTION): Primary | ICD-10-CM

## 2025-01-06 DIAGNOSIS — I63.412 CEREBROVASCULAR ACCIDENT (CVA) DUE TO EMBOLISM OF LEFT MIDDLE CEREBRAL ARTERY: ICD-10-CM

## 2025-01-06 DIAGNOSIS — I48.19 ATRIAL FIBRILLATION, PERSISTENT: ICD-10-CM

## 2025-01-06 LAB
ABSOLUTE LUNG FLUID CONTENT: 34 % (ref 20–35)
ANION GAP SERPL CALCULATED.3IONS-SCNC: 10.6 MMOL/L (ref 5–15)
BUN SERPL-MCNC: 16 MG/DL (ref 8–23)
BUN/CREAT SERPL: 13.6 (ref 7–25)
CALCIUM SPEC-SCNC: 9.5 MG/DL (ref 8.6–10.5)
CHLORIDE SERPL-SCNC: 103 MMOL/L (ref 98–107)
CO2 SERPL-SCNC: 26.4 MMOL/L (ref 22–29)
CREAT SERPL-MCNC: 1.18 MG/DL (ref 0.76–1.27)
EGFRCR SERPLBLD CKD-EPI 2021: 67.2 ML/MIN/1.73
GLUCOSE SERPL-MCNC: 100 MG/DL (ref 65–99)
MAGNESIUM SERPL-MCNC: 2.3 MG/DL (ref 1.6–2.4)
NT-PROBNP SERPL-MCNC: 754.2 PG/ML (ref 0–900)
POTASSIUM SERPL-SCNC: 5.2 MMOL/L (ref 3.5–5.2)
SODIUM SERPL-SCNC: 140 MMOL/L (ref 136–145)

## 2025-01-06 PROCEDURE — 36415 COLL VENOUS BLD VENIPUNCTURE: CPT | Performed by: PHYSICIAN ASSISTANT

## 2025-01-06 PROCEDURE — 83735 ASSAY OF MAGNESIUM: CPT | Performed by: PHYSICIAN ASSISTANT

## 2025-01-06 PROCEDURE — 83880 ASSAY OF NATRIURETIC PEPTIDE: CPT | Performed by: PHYSICIAN ASSISTANT

## 2025-01-06 PROCEDURE — 94726 PLETHYSMOGRAPHY LUNG VOLUMES: CPT | Performed by: PHYSICIAN ASSISTANT

## 2025-01-06 PROCEDURE — 80048 BASIC METABOLIC PNL TOTAL CA: CPT | Performed by: PHYSICIAN ASSISTANT

## 2025-01-06 RX ORDER — METOPROLOL SUCCINATE 100 MG/1
100 TABLET, EXTENDED RELEASE ORAL DAILY
Qty: 60 TABLET | Refills: 3 | Status: SHIPPED | OUTPATIENT
Start: 2025-01-06

## 2025-01-06 RX ORDER — SACUBITRIL AND VALSARTAN 24; 26 MG/1; MG/1
1 TABLET, FILM COATED ORAL 2 TIMES DAILY
COMMUNITY
End: 2025-01-06 | Stop reason: SDUPTHER

## 2025-01-06 RX ORDER — SPIRONOLACTONE 25 MG/1
12.5 TABLET ORAL DAILY
Qty: 15 TABLET | Refills: 3 | Status: SHIPPED | OUTPATIENT
Start: 2025-01-06

## 2025-01-06 RX ORDER — SACUBITRIL AND VALSARTAN 24; 26 MG/1; MG/1
1 TABLET, FILM COATED ORAL 2 TIMES DAILY
Qty: 180 TABLET | Refills: 1 | Status: SHIPPED | OUTPATIENT
Start: 2025-01-06 | End: 2025-07-05

## 2025-01-06 NOTE — PROGRESS NOTES
[facility]  JOCELYN Dacosta Brandy Jean, APRN  [address] [place] [id number]    Thank you for asking me to see Anshul Andrews for congestive heart failure.    HPI:     This is a 69 y.o. male with known past medical history of:    HFrEF  SHARYN from 09/17/24 with EF around 30%; severe left atrial enlargement. Severe global hypokinetic left atrial appendage  TTE from 12/24/24 with EF 31-35%  Paroxysmal atrial fibrillation  Right MCA stroke s/p thrombectomy @ [facility] in September 2024 felt to likely be cardioembolic at time of event per chart review  DM   Essential HTN  Polycythemia  Hx of cardiac thrombi (multikple  Tobacco abuse    Anshul Andrews presents for today for Heart Failure clinic evaluation.  The patient is typically seen by Rosaura Guzman APRN.  Patient's primary cardiologist is Dr. Martines.     Last known hospitalization and/or ED visit: Patient was initially hospitalized at the [facility] with right MCA stroke during which time he was found to have new onset atrial fibrillation as well as HFrEF with ejection fraction around 30%  ED visit in December 2024 on the 13th with afib with note reviewed and dc home from ED.   Accompanied by: Daughter          01/06/2025 visit data/details regarding:   Dyspnea: Denies dyspnea on exertion  Lower extremity swelling: Denies swelling of lower extremities  Abdominal swelling: Has abdominal distention  Home weight: Weight monitoring booklet provided during initial visit; provided an initial visit  Home BP: BP monitoring booklet provided during initial visit; pressure cuff provided in initial visit  Home heart rate: HR monitoring booklet provided during initial visit;  Daily activities of living: Performing independently  Pillows/lying flat: 2 pillows  HF zone: Green  Mr. Andrews is chest pain free.  Repeat echocardiogram shows EF remaining until 35% at present.  He already has referral for EP in  March for further evaluation.    Since last visit, Mr. Andrews did have one ED visit for AFib with RVR.          Review of Systems - Review of Systems   Constitutional: Negative for decreased appetite, diaphoresis and fever.   HENT:  Negative for congestion and ear pain.    Eyes:  Negative for blurred vision.   Cardiovascular:  Negative for chest pain and dyspnea on exertion.   Respiratory:  Negative for cough and shortness of breath.    Endocrine: Negative for cold intolerance and polydipsia.   Hematologic/Lymphatic: Negative for adenopathy and bleeding problem.   Skin:  Negative for dry skin and nail changes.   Musculoskeletal:  Negative for arthritis and falls.   Gastrointestinal:  Positive for nausea. Negative for bloating and anorexia.   Genitourinary:  Negative for bladder incontinence and dysuria.   Neurological:  Negative for brief paralysis and difficulty with concentration.   Psychiatric/Behavioral:  Negative for altered mental status and hallucinations.          All other systems were reviewed and were negative.    Patient Active Problem List   Diagnosis    CVA (cerebral vascular accident)    Cardiac arrhythmia    Simple chronic bronchitis    Prediabetes    Atrial fibrillation       family history includes Heart attack in his father and paternal uncle.     reports that he has quit smoking. His smoking use included cigarettes. He started smoking about 25 years ago. He has a 25 pack-year smoking history. He has never used smokeless tobacco. He reports that he does not drink alcohol and does not use drugs.    No Known Allergies      Current Outpatient Medications:     albuterol sulfate  (90 Base) MCG/ACT inhaler, Inhale 2 puffs Every 4 (Four) Hours As Needed for Wheezing., Disp: 8 g, Rfl: 2    apixaban (ELIQUIS) 5 MG tablet tablet, Take 1 tablet by mouth 2 (Two) Times a Day., Disp: 56 tablet, Rfl: 0    atorvastatin (LIPITOR) 40 MG tablet, Take 1 tablet by mouth Daily for 90 days., Disp: 90 tablet, Rfl:  0    clopidogrel (Plavix) 75 MG tablet, Take 1 tablet by mouth Daily., Disp: 30 tablet, Rfl: 6    empagliflozin (Jardiance) 10 MG tablet tablet, Take 1 tablet by mouth Daily., Disp: , Rfl:     furosemide (Lasix) 20 MG tablet, Take 1 tablet by mouth Daily As Needed (Leg swelling)., Disp: 30 tablet, Rfl: 0    metoprolol succinate XL (TOPROL-XL) 100 MG 24 hr tablet, Take 1 tablet by mouth Daily., Disp: 60 tablet, Rfl: 3    sacubitril-valsartan (Entresto) 24-26 MG tablet, Take 1 tablet by mouth 2 (Two) Times a Day for 180 days., Disp: 180 tablet, Rfl: 1    spironolactone (ALDACTONE) 25 MG tablet, Take 0.5 tablets by mouth Daily., Disp: 15 tablet, Rfl: 3    Vericiguat 2.5 MG tablet, Take 1 tablet by mouth Daily for 180 days., Disp: 90 tablet, Rfl: 1    empagliflozin (JARDIANCE) 10 MG tablet tablet, Take 1 tablet by mouth Daily for 30 days., Disp: 30 tablet, Rfl: 3    Fluticasone-Salmeterol (Wixela Inhub) 250-50 MCG/ACT DISKUS, Inhale 1 puff 2 (Two) Times a Day. (Patient not taking: Reported on 1/6/2025), Disp: 60 each, Rfl: 5      Physical Exam:  I have reviewed the patient's current vital signs as documented in the patient's EMR.   Vitals:    01/06/25 1105   BP: 111/73   Pulse: 64   SpO2: 97%         Body mass index is 26.83 kg/m².       01/06/25  1105   Weight: 105 kg (232 lb 3.2 oz)          Physical Exam  Vitals and nursing note reviewed.   Constitutional:       General: He is awake.      Appearance: Normal appearance. He is well-developed and well-groomed.   HENT:      Head: Normocephalic and atraumatic.   Eyes:      General: Lids are normal.      Conjunctiva/sclera:      Right eye: Right conjunctiva is not injected.      Left eye: Left conjunctiva is not injected.   Cardiovascular:      Rate and Rhythm: Normal rate. Rhythm irregularly irregular.      Heart sounds:      No systolic murmur is present.      No diastolic murmur is present.   Pulmonary:      Effort: No tachypnea or bradypnea.      Breath sounds: No  decreased breath sounds, wheezing or rhonchi.   Abdominal:      General: Bowel sounds are normal.      Palpations: Abdomen is soft.      Tenderness: There is no abdominal tenderness.   Musculoskeletal:      Right lower leg: No edema.      Left lower leg: No edema.   Neurological:      Mental Status: He is alert and oriented to person, place, and time.   Psychiatric:         Attention and Perception: Attention normal.         Mood and Affect: Mood normal.         Behavior: Behavior is cooperative.          JVP: Volume/Pulsation: Normal.        DATA REVIEWED:           ---------------------------------------------------  TTE/SHARYN:  Results for orders placed during the hospital encounter of 12/23/24    Adult Transthoracic Echo Limited W/ Cont if Necessary Per Protocol    Interpretation Summary    This is a limited echocardiogram to look for LVEF.  No color Doppler studies and hemodynamic valvular assessments were done.  Right side of the heart was not well-visualized.    Left ventricular systolic function is moderately decreased. Left ventricular ejection fraction appears to be 31 - 35%.    No significant changes in LVEF when compared to previous SHARYN from 9/17/2024.              LAST HEART CATH RESULT/IF AVAILABLE:     No results found for this or any previous visit.      -----------------------------------------------------  CXR/Imaging:   Imaging Results (Most Recent)       None            I personally reviewed and interpreted the CXR.      -----------------------------------------------------  CT:   XR Chest 1 View    Result Date: 12/13/2024    Unremarkable chest x-ray.  This report was finalized on 12/13/2024 12:53 PM by Dr. Iain Ng MD.     I personally reviewed the images of the CT scan.  My personal interpretation is below.      ----------------------------------------------------      --------------------------------------------------------------------------------------------------    Laboratory  "evaluations:    Lab Results   Component Value Date    GLUCOSE 100 (H) 01/06/2025    BUN 16 01/06/2025    CREATININE 1.18 01/06/2025    BCR 13.6 01/06/2025    K 5.2 01/06/2025    CO2 26.4 01/06/2025    CALCIUM 9.5 01/06/2025    ALBUMIN 4.2 12/13/2024    AST 20 12/13/2024    ALT 32 12/13/2024     Lab Results   Component Value Date    WBC 12.71 (H) 12/13/2024    HGB 17.9 (H) 12/13/2024    HCT 56.4 (H) 12/13/2024    MCV 86.5 12/13/2024     12/13/2024     No results found for: \"CHOL\", \"CHLPL\", \"TRIG\", \"HDL\", \"LDL\", \"LDLDIRECT\"  Lab Results   Component Value Date    TSH 1.770 12/13/2024     Lab Results   Component Value Date    HGBA1C 6.20 (H) 09/25/2024     Lab Results   Component Value Date    ALT 32 12/13/2024     Lab Results   Component Value Date    HGBA1C 6.20 (H) 09/25/2024     Lab Results   Component Value Date    CREATININE 1.18 01/06/2025     No results found for: \"IRON\", \"TIBC\", \"FERRITIN\"  Lab Results   Component Value Date    INR 1.28 (H) 12/13/2024    INR 1.1 09/13/2024    PROTIME 16.1 (H) 12/13/2024    PROTIME 11.4 09/13/2024        Lab Results   Component Value Date    ABSOLUTELUNG 34 01/06/2025    ABSOLUTELUNG 28 11/19/2024    ABSOLUTELUNG 27 10/28/2024           1. Chronic HFrEF (heart failure with reduced ejection fraction)    2. Cerebrovascular accident (CVA) due to embolism of left middle cerebral artery    3. Atrial fibrillation, persistent          ORDERS PLACED TODAY:  Orders Placed This Encounter   Procedures    ReDs Vest    Basic Metabolic Panel    Magnesium    proBNP    Basic Metabolic Panel    Magnesium    proBNP        Diagnoses and all orders for this visit:    1. Chronic HFrEF (heart failure with reduced ejection fraction) (Primary)  -     Basic Metabolic Panel; Future  -     Magnesium; Future  -     proBNP; Future  -     Basic Metabolic Panel; Standing  -     Basic Metabolic Panel  -     Magnesium; Standing  -     Magnesium  -     proBNP; Standing  -     proBNP  -     ReDs Vest    2. " Cerebrovascular accident (CVA) due to embolism of left middle cerebral artery  -     spironolactone (ALDACTONE) 25 MG tablet; Take 0.5 tablets by mouth Daily.  Dispense: 15 tablet; Refill: 3    3. Atrial fibrillation, persistent  -     spironolactone (ALDACTONE) 25 MG tablet; Take 0.5 tablets by mouth Daily.  Dispense: 15 tablet; Refill: 3  -     metoprolol succinate XL (TOPROL-XL) 100 MG 24 hr tablet; Take 1 tablet by mouth Daily.  Dispense: 60 tablet; Refill: 3    Other orders  -     sacubitril-valsartan (Entresto) 24-26 MG tablet; Take 1 tablet by mouth 2 (Two) Times a Day for 180 days.  Dispense: 180 tablet; Refill: 1  -     Vericiguat 2.5 MG tablet; Take 1 tablet by mouth Daily for 180 days.  Dispense: 90 tablet; Refill: 1             MEDS ORDERED TODAY:    New Medications Ordered This Visit   Medications    sacubitril-valsartan (Entresto) 24-26 MG tablet     Sig: Take 1 tablet by mouth 2 (Two) Times a Day for 180 days.     Dispense:  180 tablet     Refill:  1    Vericiguat 2.5 MG tablet     Sig: Take 1 tablet by mouth Daily for 180 days.     Dispense:  90 tablet     Refill:  1    spironolactone (ALDACTONE) 25 MG tablet     Sig: Take 0.5 tablets by mouth Daily.     Dispense:  15 tablet     Refill:  3    metoprolol succinate XL (TOPROL-XL) 100 MG 24 hr tablet     Sig: Take 1 tablet by mouth Daily.     Dispense:  60 tablet     Refill:  3        ---------------------------------------------------------------------------------------------------------------------------          ASSESSMENT/PLAN:      Diagnosis Plan   1. Chronic HFrEF (heart failure with reduced ejection fraction)  Basic Metabolic Panel    Magnesium    proBNP    Basic Metabolic Panel    Basic Metabolic Panel    Magnesium    Magnesium    proBNP    proBNP    ReDs Vest      2. Cerebrovascular accident (CVA) due to embolism of left middle cerebral artery  spironolactone (ALDACTONE) 25 MG tablet      3. Atrial fibrillation, persistent  spironolactone  (ALDACTONE) 25 MG tablet    metoprolol succinate XL (TOPROL-XL) 100 MG 24 hr tablet            NYHA stage Stage C: Structural heart disease is present AND symptoms have occurredFC-Class II: Slight limitation of physical activity. Comfortable at rest Ordinary physical activity results in fatigue, palpitation, dyspnea (shortness of breath).     Today, Patient appears euvolemic.and with  Moderate perfusion. The patient's hemodynamics are currently acceptable. HR is: normal and is at goal. BP/MAP was reviewed and there isroom for medication up-titration.  Clinical trajectory was assessed and haswaxed and waned.     CHF GOAL DIRECTED MEDICAL THERAPY FOR PATIENT ADDRESSED/ADJUSTED:     GDMT: HFrEF    Drug Class   Drug   Dose Last Dose Adjustment Notes   ACEi/ARB/ARNI Entresto 24-26mg BID     Beta Blocker Toprol XL  100mg     MRA Spironolactone 12.5mg qd     SGLT2i Jardiance  10mg  N/A   Secondaries if applicable:  Verquevo 2.5       -CHF Specific BB:    Metoprolol Succinate  at dose of 100mg.   We discussed processes/benefits of HF clinic including nursing, pharmacist, and provider evaluation during each visit with ability for in office ReDS vest, labs, and ability to provide IV diuresis in the clinic with close outpatient monitoring.  Additionally, patient was educated about the availability of delivery of medications to patient's clinic room prior to leaving the building which assists with medication compliance and insures medications are in hands when changes are made (if patient opts for apothecary usage) with thorough guidance regarding changes and medication schedule provided.          -ACE/ARB/ARNi:   Continue current Entresto dose.     -MRA:   Continue Aldactone dose.     -SGLT2 inhibitor therapy:   Continue Jardiance 10mg.      -Diuretic regimen:   ReDS Vest reading for. 01/06/25 is 34;  ReDs Vest reading reviewed with patient.    Continue daily MRA with PRN Lasix (has not required dose recently)   BMP, Mag, &  ProBNP reviewed with patient.  ProBNP remains WNL.      -Fluid restriction/Sodium restriction:   Requested 2000 ml restriction  Patient has been asked to weigh daily and was provided with a printed diuretic strategy.  1,500 mg Na restriction was discussed.    Secondary pharmacological therapy in HFreF:   EF is less than 45% @ 30%.  Repeat EF remains under 35%.  Patient has EP appointment in March 2025.    Maximized on GDMT as tolerated thus far including Spironolactone, Jardiance, beta blocker, Entresto.   Recent hospitalization or IV diuresis includes 09/2024  Given HFrEF with optimally treated with primary therapies for HFrEF and vasodilator therapy, continue Verquevo.  Could not tolerate upward dose titration 2/2 nausea.  Continue 2.5mg qd.       -Acute and/or Chronic underlying conditions other than HF addressed during visit:   Paroxysmal atrial fibrillation  Chronic anticoagulation:   Hx MCA CVA:   Continue Eliquis 5mg BID.   Continue f/u with [facility].   Keep EP f/u.      Identifiable barriers to Heart Failure Self-care:   Medical Barriers:  No immediate known barriers  Social Barriers: Financial [place] of HF treatment      --------------------------------------------------------------------------------              This document has been electronically signed by Mayte Merino PA-C  January 6, 2025 13:01 EST      Dictated Utilizing Dragon Dictation: Part of this note may be an electronic transcription/translation of spoken language to printed text using the Dragon Dictation System.    Follow-up appointment and medication changes provided in hand delivered After Visit Summary as well as reviewed in the room.

## 2025-01-06 NOTE — PROGRESS NOTES
Heart Failure Clinic    Date: 01/06/25     Vitals:    01/06/25 1105   BP: 111/73   Pulse: 64   SpO2: 97%    Weight 232.2    Method of arrival: Ambulatory    Weighing self daily: Yes    Monitoring Heart Failure Zones: Yes    Today's HF Zone: Green    Taking medications as prescribed: Yes    Edema No    Shortness of Air: No    Number of pillows used at night:>3    Educational Materials given:  avs                                                                         ReDS Value: 34  25-35 Optimal Value Status      Shikha Velasquez RN 01/06/25 11:12 EST

## 2025-01-06 NOTE — PROGRESS NOTES
Heart Failure Clinic  Pharmacist Note     Anshul Andrews is a 69 y.o. male seen in the Heart Failure Clinic for HFrEF with last EF 31-35% on 12/23/24.  Patient had a recent ED visit on 12/13/24 for afib-no changes made to medications at that time.     Anshul Andrews reports a fair understanding of medications and is accompanied by his daughter who assists him with his medications. Patient reports that he weighs himself first thing in the morning and has his BP cuff by his recliner and checks his BP every morning and reports that they are all within normal limits. He denies any lightheadedness or dizziness.     He denies any SOB or swelling today and denies having to use any PRN lasix.      Medicare is now active. Starting to run low on samples. Pt will continue to get Eliquis samples from [employer].     Medication Use:   Hx of med intolerances:  None related to HF  Retail Rx Management: [facility] [place], Medicare coverage started 01/01/2025 and vega was obtained- ship Verquvo, Entresto and Jardiance; Eliquis samples from cards ($558 copay for 1 month supply as of 1/6/25)      Past Medical History:   Diagnosis Date    Atrial fibrillation     Diabetes mellitus      ALLERGIES: Patient has no known allergies.  Current Outpatient Medications   Medication Sig Dispense Refill    albuterol sulfate  (90 Base) MCG/ACT inhaler Inhale 2 puffs Every 4 (Four) Hours As Needed for Wheezing. 8 g 2    apixaban (ELIQUIS) 5 MG tablet tablet Take 1 tablet by mouth 2 (Two) Times a Day. 56 tablet 0    atorvastatin (LIPITOR) 40 MG tablet Take 1 tablet by mouth Daily for 90 days. 90 tablet 0    clopidogrel (Plavix) 75 MG tablet Take 1 tablet by mouth Daily. 30 tablet 6    empagliflozin (Jardiance) 10 MG tablet tablet Take 1 tablet by mouth Daily.      furosemide (Lasix) 20 MG tablet Take 1 tablet by mouth Daily As Needed (Leg swelling). 30 tablet 0    metoprolol succinate XL (TOPROL-XL) 100 MG 24 hr tablet Take 1 tablet by mouth Daily. 60  "tablet 3    sacubitril-valsartan (Entresto) 24-26 MG tablet Take 1 tablet by mouth 2 (Two) Times a Day for 180 days. 180 tablet 1    spironolactone (ALDACTONE) 25 MG tablet Take 0.5 tablets by mouth Daily. 15 tablet 3    Vericiguat 2.5 MG tablet Take 1 tablet by mouth Daily for 180 days. 90 tablet 1    empagliflozin (JARDIANCE) 10 MG tablet tablet Take 1 tablet by mouth Daily for 30 days. 30 tablet 3    Fluticasone-Salmeterol (Wixela Inhub) 250-50 MCG/ACT DISKUS Inhale 1 puff 2 (Two) Times a Day. (Patient not taking: Reported on 1/6/2025) 60 each 5     No current facility-administered medications for this encounter.       Vaccination History:   Pneumonia: Pt reports UTD and that he goes through PCP   Annual Influenza: [id number] received on 10/3/24  Shingles: Declines 11/19/24    Objective  Vitals:    01/06/25 1105   BP: 111/73   BP Location: Left arm   Patient Position: Sitting   Cuff Size: Adult   Pulse: 64   SpO2: 97%   Weight: 105 kg (232 lb 3.2 oz)   Height: 198.1 cm (78\")       Wt Readings from Last 3 Encounters:   01/06/25 105 kg (232 lb 3.2 oz)   12/13/24 108 kg (237 lb)   11/19/24 109 kg (239 lb 12.8 oz)         01/06/25  1105   Weight: 105 kg (232 lb 3.2 oz)       Lab Results   Component Value Date    GLUCOSE 100 (H) 01/06/2025    BUN 16 01/06/2025    CREATININE 1.18 01/06/2025    BCR 13.6 01/06/2025    K 5.2 01/06/2025    CO2 26.4 01/06/2025    CALCIUM 9.5 01/06/2025    ALBUMIN 4.2 12/13/2024    AST 20 12/13/2024    ALT 32 12/13/2024     Lab Results   Component Value Date    WBC 12.71 (H) 12/13/2024    HGB 17.9 (H) 12/13/2024    HCT 56.4 (H) 12/13/2024    MCV 86.5 12/13/2024     12/13/2024     Lab Results   Component Value Date    TROPONINT 16 12/13/2024     Lab Results   Component Value Date    PROBNP 754.2 01/06/2025     Results for orders placed during the hospital encounter of 12/23/24    Adult Transthoracic Echo Limited W/ Cont if Necessary Per Protocol    Interpretation Summary    This is a " limited echocardiogram to look for LVEF.  No color Doppler studies and hemodynamic valvular assessments were done.  Right side of the heart was not well-visualized.    Left ventricular systolic function is moderately decreased. Left ventricular ejection fraction appears to be 31 - 35%.    No significant changes in LVEF when compared to previous SHARYN from 9/17/2024.         GDMT    Drug Class   Drug   Dose Last Dose Adjustment Additional Titration   Notes   ACEi/ARB/ARNI Entresto  24/26 mg 10/17/24     Beta Blocker Toprol  mg  10/17/24     MRA Spironolactone 12.5 mg 10/17/24     SGLT2i Jardiance  10 mg  10/2/24      Verquvo  2.5mg 11/19/24  Nausea with 5mg    Lasix 20mg PRN      Drug Therapy Problems    Financial assistance  GDMT    Recommendations:     [facility] obtained for patient- will fill the three covered by the silvano with us and ship to him. All others to be sent to [facility] in [facility]  Continue on current doses- since tolerating them well with no issues.       Patient was educated on heart failure medications and the importance of medication adherence. All questions were addressed and patient expressed  understanding.     Thank you for allowing me to participate in the care of your patient,    Emily Boucher, PharmD  01/06/25  12:00 EST

## 2025-01-07 ENCOUNTER — TELEPHONE (OUTPATIENT)
Dept: CARDIOLOGY | Facility: CLINIC | Age: 70
End: 2025-01-07
Payer: MEDICARE

## 2025-01-07 NOTE — TELEPHONE ENCOUNTER
Called pt and advised him about the extra help program. He stated he was not able to write down the number right now and will have his daughter call us. I advised him he will need to apply for that and bring by the denial letter and proof of income and we start the pt assistance application due to us not able to supply samples all the time.        His co-pay for Eliquis is $143 for a 30 day supply.

## 2025-01-07 NOTE — TELEPHONE ENCOUNTER
Caller Name: Amairani OCAMPO      Relationship: Child      Best Contact Number: [phone]    Patient is requesting samples of ELIQUIS      How many days of medication do you have left? 1 DAY PLEASE ADVISE

## 2025-01-10 NOTE — ADDENDUM NOTE
Encounter addended by: Amada Shaw [facility] on: 1/10/2025 9:22 AM   Actions taken: Social Care Target section edited

## 2025-01-16 ENCOUNTER — OFFICE VISIT (OUTPATIENT)
Dept: CARDIOLOGY | Facility: CLINIC | Age: 70
End: 2025-01-16
Payer: MEDICARE

## 2025-01-16 VITALS
BODY MASS INDEX: 26.84 KG/M2 | SYSTOLIC BLOOD PRESSURE: 110 MMHG | DIASTOLIC BLOOD PRESSURE: 70 MMHG | HEIGHT: 78 IN | WEIGHT: 232 LBS

## 2025-01-16 DIAGNOSIS — R73.03 PREDIABETES: ICD-10-CM

## 2025-01-16 DIAGNOSIS — I63.412 CEREBROVASCULAR ACCIDENT (CVA) DUE TO EMBOLISM OF LEFT MIDDLE CEREBRAL ARTERY: ICD-10-CM

## 2025-01-16 DIAGNOSIS — I48.19 ATRIAL FIBRILLATION, PERSISTENT: ICD-10-CM

## 2025-01-16 DIAGNOSIS — Z79.01 CHRONIC ANTICOAGULATION: Primary | ICD-10-CM

## 2025-01-16 DIAGNOSIS — I50.21 ACUTE HFREF (HEART FAILURE WITH REDUCED EJECTION FRACTION): ICD-10-CM

## 2025-01-16 DIAGNOSIS — I51.3 THROMBUS OF LEFT ATRIAL APPENDAGE: ICD-10-CM

## 2025-01-16 RX ORDER — ATORVASTATIN CALCIUM 40 MG/1
40 TABLET, FILM COATED ORAL DAILY
Qty: 90 TABLET | Refills: 3 | Status: SHIPPED | OUTPATIENT
Start: 2025-01-16

## 2025-01-16 RX ORDER — NITROGLYCERIN 0.4 MG/1
0.4 TABLET SUBLINGUAL
OUTPATIENT
Start: 2025-01-16 | End: 2025-01-16

## 2025-01-16 RX ORDER — SODIUM CHLORIDE 0.9 % (FLUSH) 0.9 %
10 SYRINGE (ML) INJECTION AS NEEDED
OUTPATIENT
Start: 2025-01-16

## 2025-01-16 RX ORDER — SODIUM CHLORIDE 9 MG/ML
40 INJECTION, SOLUTION INTRAVENOUS AS NEEDED
OUTPATIENT
Start: 2025-01-16

## 2025-01-16 RX ORDER — SODIUM CHLORIDE 0.9 % (FLUSH) 0.9 %
10 SYRINGE (ML) INJECTION EVERY 12 HOURS SCHEDULED
OUTPATIENT
Start: 2025-01-16

## 2025-01-16 NOTE — PROGRESS NOTES
Rosaura Guzman, CHARISSE  No ref. provider found    Anshul Andrews  1955 01/16/2025    Subjective     Anshul Andrews is a 69 y.o. male who presents today to [facility] CARDIOLOGY for Follow-up.    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      Patient reports that he went to [facility] after having an episode of syncope where he was found to have a right MCA stroke.  He underwent thrombectomy.  Subsequent workup included a SHARYN which showed multiple thrombi in his left atrial appendage.  There was a discussion about embolectomy for left atrial appendage clots but given the risk of the procedure it was not done and he was kept on anticoagulation.  Patient was transferred to [facility] inpatient rehab where he completed his rehab.                History of Present Illness      He reports good health since his last visit, with no chest pain, dyspnea, leg swelling, orthopnea, paroxysmal nocturnal dyspnea, palpitations, dizziness, lightheadedness, or syncope. He performs daily activities independently. He has an appointment with Dr. Duke, an electrophysiologist, in March 2024. Spironolactone is effective, and he has not required Lasix. He has not used inhalers since rehabilitation discharge. His weight is stable with no significant changes. No bleeding or complications. No history of stent placement.    He is on Eliquis but seeks alternatives due to financial constraints. He completed paperwork for the heart failure clinic, which provides three medications at no cost. Other  medications are affordable except for Eliquis.         No Known Allergies:    Current Outpatient Medications:     albuterol sulfate  (90 Base) MCG/ACT inhaler, Inhale 2 puffs Every 4 (Four) Hours As Needed for Wheezing., Disp: 8 g, Rfl: 2    apixaban (ELIQUIS) 5 MG tablet tablet, Take 1 tablet by mouth 2 (Two) Times a Day., Disp: 56 tablet, Rfl: 0    atorvastatin (LIPITOR) 40 MG tablet, Take 1 tablet by mouth Daily., Disp: 90 tablet, Rfl: 3    clopidogrel (Plavix) 75 MG tablet, Take 1 tablet by mouth Daily., Disp: 30 tablet, Rfl: 6    empagliflozin (JARDIANCE) 10 MG tablet tablet, Take 1 tablet by mouth Daily for 30 days., Disp: 30 tablet, Rfl: 3    empagliflozin (Jardiance) 10 MG tablet tablet, Take 1 tablet by mouth Daily., Disp: , Rfl:     furosemide (Lasix) 20 MG tablet, Take 1 tablet by mouth Daily As Needed (Leg swelling)., Disp: 30 tablet, Rfl: 0    metoprolol succinate XL (TOPROL-XL) 100 MG 24 hr tablet, Take 1 tablet by mouth Daily., Disp: 60 tablet, Rfl: 3    sacubitril-valsartan (Entresto) 24-26 MG tablet, Take 1 tablet by mouth 2 (Two) Times a Day for 180 days., Disp: 180 tablet, Rfl: 1    spironolactone (ALDACTONE) 25 MG tablet, Take 0.5 tablets by mouth Daily., Disp: 15 tablet, Rfl: 3    Vericiguat 2.5 MG tablet, Take 1 tablet by mouth Daily for 180 days., Disp: 90 tablet, Rfl: 1    apixaban (ELIQUIS) 5 MG tablet tablet, Take 1 tablet by mouth 2 (Two) Times a Day., Disp: 56 tablet, Rfl: 0    Fluticasone-Salmeterol (Wixela Inhub) 250-50 MCG/ACT DISKUS, Inhale 1 puff 2 (Two) Times a Day. (Patient not taking: Reported on 1/16/2025), Disp: 60 each, Rfl: 5    Past Medical History:   Diagnosis Date    Atrial fibrillation     Diabetes mellitus      Past Surgical History:   Procedure Laterality Date    KNEE SURGERY       Family History   Problem Relation Age of Onset    Heart attack Father     Heart attack Paternal Uncle      Social History     Tobacco Use    Smoking status: Former     Current  "packs/day: 1.00     Average packs/day: 1 pack/day for 25.0 years (25.0 ttl pk-yrs)     Types: Cigarettes     Start date: 2000    Smokeless tobacco: Never   Vaping Use    Vaping status: Never Used   Substance Use Topics    Alcohol use: Never    Drug use: Never       Objective   Blood pressure 110/70, height 198.1 cm (77.99\"), weight 105 kg (232 lb).  Body mass index is 26.82 kg/m².    Constitutional:       Appearance: Not in distress.   Neck:      Vascular: JVD normal.   Pulmonary:      Effort: Pulmonary effort is normal.      Breath sounds: Normal breath sounds.   Cardiovascular:      Tachycardia present. Irregularly irregular rhythm. Normal S1. Normal S2.       Murmurs: There is no murmur.   Edema:     Peripheral edema absent.   Skin:     General: Skin is warm.   Neurological:      General: No focal deficit present.           DATA:   Results for orders placed during the hospital encounter of 12/23/24    Adult Transthoracic Echo Limited W/ Cont if Necessary Per Protocol    Interpretation Summary    This is a limited echocardiogram to look for LVEF.  No color Doppler studies and hemodynamic valvular assessments were done.  Right side of the heart was not well-visualized.    Left ventricular systolic function is moderately decreased. Left ventricular ejection fraction appears to be 31 - 35%.    No significant changes in LVEF when compared to previous SHARYN from 9/17/2024.          No results found for: \"BNP\"  No results found for: \"PSA\"   Lab Results   Component Value Date    MG 2.3 01/06/2025     Lab Results   Component Value Date    INR 1.28 (H) 12/13/2024    INR 1.1 09/13/2024     No results found for: \"CKTOTAL\"  No results found for: \"CHOL\", \"CHLPL\"  No results found for: \"TRIG\"  No results found for: \"HDL\"  No results found for: \"LDL\", \"LDLDIRECT\"  No components found for: \"A1C\"      Lab Results   Component Value Date    TSH 1.770 12/13/2024             Invalid input(s): \"LABALBU\", \"PROT\"        Results review: " During today's encounter, all relevant clinical data has been reviewed.        ECG 12 Lead    Date/Time: 1/16/2025 11:08 AM  Performed by: Josh Martines MD    Authorized by: Josh Martines MD  Rhythm: atrial fibrillation  Rate: tachycardic  QRS axis: normal    Clinical impression: abnormal EKG          Assessment & Plan    Diagnosis Plan   1. Chronic anticoagulation        2. Cerebrovascular accident (CVA) due to embolism of left middle cerebral artery  atorvastatin (LIPITOR) 40 MG tablet      3. Atrial fibrillation, persistent  atorvastatin (LIPITOR) 40 MG tablet    CT Angiogram Coronary    nitroglycerin (NITROSTAT) SL tablet 0.4 mg    No Caffeine or Nicotine 4 Hours Prior to CTA Appointment    Nothing to Eat or Drink 4 Hours Prior to CTA Appointment    Do Not Take Phosphodiasterase Inhibitors in the 72 Hours Prior to Coronary CTA    Obtain Informed Consent - Computed Tomography Angiography of Chest - Angiogram of Coronary Arteries    Vital Signs Upon Arrival    Cardiac Monitoring    Verify NPO Status - Patient to Be NPO at Least 4 Hours Prior to CTA    Notify CT After Administration of metoprolol tartrate (LOPRESSOR) tablet    Notify Provider If Total Metoprolol Given Equals 300mg & Heart Rate Not At Goal    Notify Provider Prior to Administration of Nitroglycerin if Patient SBP <80    POC Creatinine    Insert Peripheral IV    Saline Lock & Maintain IV Access    sodium chloride 0.9 % flush 10 mL    sodium chloride 0.9 % flush 10 mL    sodium chloride 0.9 % infusion 40 mL    Vital Signs - See Instructions    Hold Medication Metformin (Glucophage, Glucophage XR, Fortament, Glumetza);  Metglip (metformin/glipizide);  Glucovance (metformin/glyburide); Avandamet (metformin/rosiglitazone)    Patient May Discharge Home After Procedure Complete (If Stable)    No Metoprolol Prescription Needed    ECG 12 Lead      4. Acute HFrEF (heart failure with reduced ejection fraction)  atorvastatin (LIPITOR) 40 MG tablet     CT Angiogram Coronary    nitroglycerin (NITROSTAT) SL tablet 0.4 mg    No Caffeine or Nicotine 4 Hours Prior to CTA Appointment    Nothing to Eat or Drink 4 Hours Prior to CTA Appointment    Do Not Take Phosphodiasterase Inhibitors in the 72 Hours Prior to Coronary CTA    Obtain Informed Consent - Computed Tomography Angiography of Chest - Angiogram of Coronary Arteries    Vital Signs Upon Arrival    Cardiac Monitoring    Verify NPO Status - Patient to Be NPO at Least 4 Hours Prior to CTA    Notify CT After Administration of metoprolol tartrate (LOPRESSOR) tablet    Notify Provider If Total Metoprolol Given Equals 300mg & Heart Rate Not At Goal    Notify Provider Prior to Administration of Nitroglycerin if Patient SBP <80    POC Creatinine    Insert Peripheral IV    Saline Lock & Maintain IV Access    sodium chloride 0.9 % flush 10 mL    sodium chloride 0.9 % flush 10 mL    sodium chloride 0.9 % infusion 40 mL    Vital Signs - See Instructions    Hold Medication Metformin (Glucophage, Glucophage XR, Fortament, Glumetza);  Metglip (metformin/glipizide);  Glucovance (metformin/glyburide); Avandamet (metformin/rosiglitazone)    Patient May Discharge Home After Procedure Complete (If Stable)    No Metoprolol Prescription Needed      5. Prediabetes        6. Thrombus of left atrial appendage          Assessment & Plan  1. Heart failure with reduced ejection fraction - Cardiac function remains compromised with an ejection fraction of 30-35%. At risk for arrhythmias with a history of atrial fibrillation and stroke. Etiology of weakened heart is undetermined.  - Initiated heart failure pharmacotherapy  - No ICD currently  - Scheduled to consult Dr. Duke for ICD evaluation and  Afib management  - Advised to avoid strenuous activity and take Lasix as needed  - CT coronary angiogram ordered to assess coronary blood supply  - EKG today to monitor heart rate  - Patient is not cleared from cardiovascular standpoint to return to  work since he is at significant risk of worsening arrhythmias due to weak heart and being in atrial fibrillation.   -Continue with Jardiance 10 mg p.o. once daily, Toprol- mg p.o. once daily, Entresto 24 to 26 mg p.o. twice daily, Aldactone 12.5 mg p.o. once daily and vericiguat 2.5 mg p.o. once daily    2. Atrial fibrillation with RVR - On Eliquis for anticoagulation. Provided with a month's supply of samples due to cost. Paperwork for assistance through [employer] [facility]. Advised not to miss doses to prevent clot formation.  - EKG today to monitor heart rate  - If Eliquis is not covered, consider switching to Coumadin or warfarin with weekly INR monitoring  -Continue Eliquis 5 mg p.o. twice daily for stroke prophylaxis  -Continue Toprol- mg p.o. once daily for rate control.  Patient is currently denying any worsening symptoms of palpitations dizziness lightheadedness or episodes of syncope.  Continue current dose.  Will continue with the rate control strategy at this time.  Will need a repeat SHARYN make sure that the left atrial appendage clot has resolved before pursuing rhythm control strategy.  Will rule out coronary artery disease before pursuing rhythm control strategies.    Recommendations  Orders Placed This Encounter   Procedures    CT Angiogram Coronary    No Caffeine or Nicotine 4 Hours Prior to CTA Appointment    Nothing to Eat or Drink 4 Hours Prior to CTA Appointment    Do Not Take Phosphodiasterase Inhibitors in the 72 Hours Prior to Coronary CTA    No Metoprolol Prescription Needed    ECG 12 Lead          New Medications:   New Medications Ordered This Visit   Medications    atorvastatin (LIPITOR) 40 MG tablet     Sig: Take 1 tablet by mouth Daily.     Dispense:  90 tablet     Refill:  3    apixaban (ELIQUIS) 5 MG tablet tablet     Sig: Take 1 tablet by mouth 2 (Two) Times a Day.     Dispense:  56 tablet     Refill:  0     Lot [id number]  Exp 03/2026       Discontinued Medications:    Medications Discontinued During This Encounter   Medication Reason    atorvastatin (LIPITOR) 40 MG tablet Reorder        Return in about 6 weeks (around 2/27/2025).    Patient or patient representative verbalized consent for the use of Ambient Listening during the visit with  Josh Martines MD for chart documentation. 1/16/2025  12:32 EST      Thank you for allowing me to participate in the care of Anshul Andrews. Feel free to contact me directly with any further questions or concerns.        This document has been electronically signed by Josh Martines MD   January 16, 2025 12:41 EST    Dictated Utilizing Dragon Dictation: Part of this note may be an electronic transcription/translation of spoken language to printed text using the Dragon Dictation System.

## 2025-01-16 NOTE — LETTER
January 16, 2025     Patient: Anshul Andrews   YOB: 1955   Date of Visit: 1/16/2025       To Whom It May Concern:    It is my medical opinion that Anshul Andrews not cleared from cardiovascular standpoint to return to work due to the fact that he has heart failure with reduced ejection fraction and atrial fibrillation with rapid ventricular response.  He continues to be at risk of further arrhythmias like ventricular tachycardia's due to weak heart and is currently waiting to see electrophysiology for ICD evaluation.  He also recently had a right middle cerebral artery embolic stroke [facility] and was found to have multiple left atrial thrombi.  He continues to be on systemic anticoagulation with Eliquis.  He is also at risk of retaining extra fluid in his body due to weak heart requiring diuretics.      Adult Transthoracic Echo Limited none on 12/23/2024    Interpretation Summary  •  This is a limited echocardiogram to look for LVEF.  No color Doppler studies and hemodynamic valvular assessments were done.  Right side of the heart was not well-visualized.  •  Left ventricular systolic function is moderately decreased. Left ventricular ejection fraction appears to be 31 - 35%.  •  No significant changes in LVEF when compared to previous SHARYN from 9/17/2024.    ECG 12 Lead     Date/Time: 1/16/2025 11:08 AM  Performed by: Josh Martines MD     Authorized by: Josh Martines MD  Rhythm: atrial fibrillation  Rate: tachycardic  QRS axis: normal     Clinical impression: abnormal EKG        Sincerely,        Josh Martines MD    CC: No Recipients

## 2025-02-07 ENCOUNTER — OFFICE VISIT (OUTPATIENT)
Dept: FAMILY MEDICINE CLINIC | Facility: CLINIC | Age: 70
End: 2025-02-07
Payer: MEDICARE

## 2025-02-07 VITALS
HEIGHT: 78 IN | TEMPERATURE: 96.3 F | HEART RATE: 99 BPM | OXYGEN SATURATION: 99 % | DIASTOLIC BLOOD PRESSURE: 60 MMHG | SYSTOLIC BLOOD PRESSURE: 88 MMHG | RESPIRATION RATE: 16 BRPM | WEIGHT: 234 LBS | BODY MASS INDEX: 27.07 KG/M2

## 2025-02-07 DIAGNOSIS — J41.0 SIMPLE CHRONIC BRONCHITIS: Chronic | ICD-10-CM

## 2025-02-07 DIAGNOSIS — I48.19 ATRIAL FIBRILLATION, PERSISTENT: Chronic | ICD-10-CM

## 2025-02-07 DIAGNOSIS — I50.42 CHRONIC COMBINED SYSTOLIC AND DIASTOLIC HEART FAILURE, NYHA CLASS 2: Chronic | ICD-10-CM

## 2025-02-07 DIAGNOSIS — G47.30 SLEEP APNEA, UNSPECIFIED TYPE: Chronic | ICD-10-CM

## 2025-02-07 DIAGNOSIS — R73.03 PREDIABETES: Chronic | ICD-10-CM

## 2025-02-07 DIAGNOSIS — I63.412 CEREBROVASCULAR ACCIDENT (CVA) DUE TO EMBOLISM OF LEFT MIDDLE CEREBRAL ARTERY: Primary | Chronic | ICD-10-CM

## 2025-02-07 LAB
ALBUMIN SERPL-MCNC: 3.5 G/DL (ref 3.5–5.2)
ALBUMIN/GLOB SERPL: 1.1 G/DL
ALP SERPL-CCNC: 129 U/L (ref 39–117)
ALT SERPL W P-5'-P-CCNC: 21 U/L (ref 1–41)
ANION GAP SERPL CALCULATED.3IONS-SCNC: 10.1 MMOL/L (ref 5–15)
AST SERPL-CCNC: 15 U/L (ref 1–40)
BASOPHILS # BLD AUTO: 0.07 10*3/MM3 (ref 0–0.2)
BASOPHILS NFR BLD AUTO: 0.6 % (ref 0–1.5)
BILIRUB SERPL-MCNC: 0.5 MG/DL (ref 0–1.2)
BUN SERPL-MCNC: 17 MG/DL (ref 8–23)
BUN/CREAT SERPL: 13.6 (ref 7–25)
CALCIUM SPEC-SCNC: 9.9 MG/DL (ref 8.6–10.5)
CHLORIDE SERPL-SCNC: 106 MMOL/L (ref 98–107)
CHOLEST SERPL-MCNC: 145 MG/DL (ref 0–200)
CO2 SERPL-SCNC: 23.9 MMOL/L (ref 22–29)
CREAT SERPL-MCNC: 1.25 MG/DL (ref 0.76–1.27)
DEPRECATED RDW RBC AUTO: 39.7 FL (ref 37–54)
EGFRCR SERPLBLD CKD-EPI 2021: 62.7 ML/MIN/1.73
EOSINOPHIL # BLD AUTO: 0.09 10*3/MM3 (ref 0–0.4)
EOSINOPHIL NFR BLD AUTO: 0.8 % (ref 0.3–6.2)
ERYTHROCYTE [DISTWIDTH] IN BLOOD BY AUTOMATED COUNT: 13 % (ref 12.3–15.4)
GLOBULIN UR ELPH-MCNC: 3.1 GM/DL
GLUCOSE SERPL-MCNC: 107 MG/DL (ref 65–99)
HBA1C MFR BLD: 5.9 % (ref 4.8–5.6)
HCT VFR BLD AUTO: 51.2 % (ref 37.5–51)
HDLC SERPL-MCNC: 37 MG/DL (ref 40–60)
HGB BLD-MCNC: 17.1 G/DL (ref 13–17.7)
IMM GRANULOCYTES # BLD AUTO: 0.04 10*3/MM3 (ref 0–0.05)
IMM GRANULOCYTES NFR BLD AUTO: 0.4 % (ref 0–0.5)
LDLC SERPL CALC-MCNC: 74 MG/DL (ref 0–100)
LDLC/HDLC SERPL: 1.82 {RATIO}
LYMPHOCYTES # BLD AUTO: 2.56 10*3/MM3 (ref 0.7–3.1)
LYMPHOCYTES NFR BLD AUTO: 22.8 % (ref 19.6–45.3)
MCH RBC QN AUTO: 28.4 PG (ref 26.6–33)
MCHC RBC AUTO-ENTMCNC: 33.4 G/DL (ref 31.5–35.7)
MCV RBC AUTO: 85 FL (ref 79–97)
MONOCYTES # BLD AUTO: 0.69 10*3/MM3 (ref 0.1–0.9)
MONOCYTES NFR BLD AUTO: 6.1 % (ref 5–12)
NEUTROPHILS NFR BLD AUTO: 69.3 % (ref 42.7–76)
NEUTROPHILS NFR BLD AUTO: 7.79 10*3/MM3 (ref 1.7–7)
NRBC BLD AUTO-RTO: 0 /100 WBC (ref 0–0.2)
PLATELET # BLD AUTO: 307 10*3/MM3 (ref 140–450)
PMV BLD AUTO: 11.9 FL (ref 6–12)
POTASSIUM SERPL-SCNC: 4.7 MMOL/L (ref 3.5–5.2)
PROT SERPL-MCNC: 6.6 G/DL (ref 6–8.5)
RBC # BLD AUTO: 6.02 10*6/MM3 (ref 4.14–5.8)
SODIUM SERPL-SCNC: 140 MMOL/L (ref 136–145)
TRIGL SERPL-MCNC: 204 MG/DL (ref 0–150)
VLDLC SERPL-MCNC: 34 MG/DL (ref 5–40)
WBC NRBC COR # BLD AUTO: 11.24 10*3/MM3 (ref 3.4–10.8)

## 2025-02-07 PROCEDURE — 1160F RVW MEDS BY RX/DR IN RCRD: CPT | Performed by: NURSE PRACTITIONER

## 2025-02-07 PROCEDURE — 80053 COMPREHEN METABOLIC PANEL: CPT | Performed by: NURSE PRACTITIONER

## 2025-02-07 PROCEDURE — 99214 OFFICE O/P EST MOD 30 MIN: CPT | Performed by: NURSE PRACTITIONER

## 2025-02-07 PROCEDURE — 83036 HEMOGLOBIN GLYCOSYLATED A1C: CPT | Performed by: NURSE PRACTITIONER

## 2025-02-07 PROCEDURE — 1126F AMNT PAIN NOTED NONE PRSNT: CPT | Performed by: NURSE PRACTITIONER

## 2025-02-07 PROCEDURE — 80061 LIPID PANEL: CPT | Performed by: NURSE PRACTITIONER

## 2025-02-07 PROCEDURE — 85025 COMPLETE CBC W/AUTO DIFF WBC: CPT | Performed by: NURSE PRACTITIONER

## 2025-02-07 PROCEDURE — 1159F MED LIST DOCD IN RCRD: CPT | Performed by: NURSE PRACTITIONER

## 2025-02-07 PROCEDURE — 36415 COLL VENOUS BLD VENIPUNCTURE: CPT | Performed by: NURSE PRACTITIONER

## 2025-02-07 RX ORDER — ALBUTEROL SULFATE 90 UG/1
2 INHALANT RESPIRATORY (INHALATION) EVERY 4 HOURS PRN
Qty: 8 G | Refills: 2 | Status: SHIPPED | OUTPATIENT
Start: 2025-02-07

## 2025-02-07 RX ORDER — FLUTICASONE PROPIONATE AND SALMETEROL 250; 50 UG/1; UG/1
1 POWDER RESPIRATORY (INHALATION)
Qty: 60 EACH | Refills: 5 | Status: SHIPPED | OUTPATIENT
Start: 2025-02-07

## 2025-02-07 NOTE — PROGRESS NOTES
Venipuncture Blood Specimen Collection  Venipuncture performed in right arm by Samaria Molina MA with good hemostasis. Patient tolerated the procedure well without complications.   02/07/25   Samaria Molina MA

## 2025-02-07 NOTE — PROGRESS NOTES
Medication reviewed no changes  Allergies reviewed no changes  Surgeries reviewed no changes   Histories reviewed no changes

## 2025-02-07 NOTE — PROGRESS NOTES
"Subjective   Anshul Andrews is a 69 y.o. male.     Chief Complaint   Patient presents with    Atrial Fibrillation     Follow up    Cerebrovascular Accident    Congestive Heart Failure    Prediabetes       History of Present Illness  He presents for follow up of CVA, afib, chf with reduced ejection fraction 31-35%, copd and prediabetes. He states he still doesn't have a lot of energy after the stroke. Stroke was thought to be embolic caused by afib. He states he snores. He states he talks in his sleep a lot and dreams a lot. He states he takes naps during day. He states he doesn't feel the afib. He is taking eliquis twice a day. He has an appointment with Dr. Rolle for the afib in the near future. Heart failure clinic manages his chf. He states he has never had to take the lasix. He states he hasn't had to use the inhalers for the COPD. He states his blood sugar has been good when his son checks it.   Atrial Fibrillation  Symptoms are negative for chest pain, palpitations and shortness of breath. Past medical history includes atrial fibrillation.        The following portions of the patient's history were reviewed and updated as appropriate: allergies, current medications, past family history, past medical history, past social history, past surgical history and problem list.    Review of Systems   Constitutional:  Positive for fatigue.   Respiratory:  Negative for cough, shortness of breath and wheezing.    Cardiovascular:  Negative for chest pain and palpitations.       Objective     BP (!) 88/60 (BP Location: Right arm, Patient Position: Sitting, Cuff Size: Large Adult)   Pulse 99   Temp 96.3 °F (35.7 °C) (Temporal)   Resp 16   Ht 198.1 cm (77.99\")   Wt 106 kg (234 lb)   SpO2 99%   BMI 27.05 kg/m²     Physical Exam  Vitals reviewed.   Constitutional:       General: He is not in acute distress.     Appearance: He is well-developed. He is not diaphoretic.   HENT:      Head: Normocephalic and atraumatic. "   Cardiovascular:      Rate and Rhythm: Normal rate and regular rhythm.      Heart sounds: Normal heart sounds. No murmur heard.     No friction rub. No gallop.   Pulmonary:      Effort: Pulmonary effort is normal. No respiratory distress.      Breath sounds: Normal breath sounds.   Abdominal:      General: Bowel sounds are normal. There is no distension.      Palpations: Abdomen is soft.      Tenderness: There is no abdominal tenderness.   Skin:     General: Skin is warm and dry.   Neurological:      Mental Status: He is alert and oriented to person, place, and time.   Psychiatric:         Behavior: Behavior normal.         Thought Content: Thought content normal.         Judgment: Judgment normal.         Current Outpatient Medications   Medication Sig Dispense Refill    albuterol sulfate  (90 Base) MCG/ACT inhaler Inhale 2 puffs Every 4 (Four) Hours As Needed for Wheezing. 8 g 2    apixaban (ELIQUIS) 5 MG tablet tablet Take 1 tablet by mouth 2 (Two) Times a Day. 56 tablet 0    atorvastatin (LIPITOR) 40 MG tablet Take 1 tablet by mouth Daily. 90 tablet 3    clopidogrel (Plavix) 75 MG tablet Take 1 tablet by mouth Daily. 30 tablet 6    empagliflozin (JARDIANCE) 10 MG tablet tablet Take 1 tablet by mouth Daily for 180 days. 30 tablet 5    Fluticasone-Salmeterol (Wixela Inhub) 250-50 MCG/ACT DISKUS Inhale 1 puff 2 (Two) Times a Day. 60 each 5    furosemide (Lasix) 20 MG tablet Take 1 tablet by mouth Daily As Needed (Leg swelling). 30 tablet 0    metoprolol succinate XL (TOPROL-XL) 100 MG 24 hr tablet Take 1 tablet by mouth Daily. 60 tablet 3    sacubitril-valsartan (Entresto) 24-26 MG tablet Take 1 tablet by mouth 2 (Two) Times a Day for 180 days. 180 tablet 1    spironolactone (ALDACTONE) 25 MG tablet Take 0.5 tablets by mouth Daily. 15 tablet 3    Vericiguat 2.5 MG tablet Take 1 tablet by mouth Daily for 180 days. 90 tablet 1     No current facility-administered medications for this visit.             Assessment & Plan     Problem List Items Addressed This Visit       CVA (cerebral vascular accident)    Relevant Medications    empagliflozin (JARDIANCE) 10 MG tablet tablet    Other Relevant Orders    Lipid Panel    Simple chronic bronchitis (Chronic)    Relevant Medications    albuterol sulfate  (90 Base) MCG/ACT inhaler    Fluticasone-Salmeterol (Wixela Inhub) 250-50 MCG/ACT DISKUS    Prediabetes (Chronic)    Relevant Orders    Hemoglobin A1c     Other Visit Diagnoses       Sleep apnea, unspecified type    -  Primary    Relevant Orders    Home Sleep Study    Atrial fibrillation, persistent        Relevant Medications    apixaban (ELIQUIS) 5 MG tablet tablet    empagliflozin (JARDIANCE) 10 MG tablet tablet    Chronic combined systolic and diastolic heart failure, NYHA class 2        Relevant Medications    empagliflozin (JARDIANCE) 10 MG tablet tablet    Other Relevant Orders    CBC & Differential    Comprehensive Metabolic Panel              ICD-10-CM ICD-9-CM   1. Sleep apnea, unspecified type  G47.30 780.57   2. Simple chronic bronchitis  J41.0 491.0   3. Cerebrovascular accident (CVA) due to embolism of left middle cerebral artery  I63.412 434.11   4. Atrial fibrillation, persistent  I48.19 427.31   5. Chronic combined systolic and diastolic heart failure, NYHA class 2  I50.42 428.42   6. Prediabetes  R73.03 790.29     Plan: Get labs today. Continue current medications. Get home sleep study to assess for sleep apnea. Keep appointments with cardiology and the heart failure clinic. I have advised him to use the wixela inhaler twice a day and rinse his mouth afterward to improve lung function. He verbalized an understanding of the same. He declines pneumonia vaccine.  His blood pressure is low today.  He states he does not feel faint or weak.  He does have the fatigue that he has had since the stroke.  He should keep an eye on his blood pressure at home.  He states he does and it is normally 120 systolic.   Follow up in 3 months and as needed.     @Body mass index is 27.05 kg/m².              Understands disease processes and need for medications.  Understands reasons for urgent and emergent care.  Patient (& family) verbalized agreement for treatment plan.   Emotional support and active listening provided.  Patient provided time to verbalize feelings.                  This document has been electronically signed by CHARISSE Trujillo   February 7, 2025 10:11 EST

## 2025-02-10 NOTE — PROGRESS NOTES
Blood sugar, electrolytes, liver, and kidney function are normal.  Triglycerides are elevated.  Cut back on breads, sweets, carbs, sugars.  Fish oil supplement will help that as well.  A1c your average blood sugar is 5.9 that is very good.  Blood counts are stable.

## 2025-03-06 NOTE — PROGRESS NOTES
Cardiac Electrophysiology Outpatient Note  [facility] at [facility]    Office Visit     Anshul Andrews  [id number]  03/07/2025    Primary Care Physician: Rosaura Guzman APRN    Referred By: Josh Martines MD    Subjective     Chief Complaint   Patient presents with    Atrial Fibrillation     Problem List:  Persistent atrial fibrillation  SHARYN 9/2024: LVEF 30%, moderate to severe global hypokinesis of the LV, severe LA enlargement, mobile NIMA thrombi  CVA  [facility] admission 9-10/24 embolic stroke to right MCA s/p TNK and thrombectomy, [facility]   HFrEF  SHARYN 9/2024: LVEF 30%  TTE 12/2024: LVEF 31-35%, no changes from prior SHARYN  Coronary CTA pending  Diabetes  Hypertension  Polycythemia  Tobacco use      History of Present Illness:   Anshul Andrews is a 69 y.o. male who presents to my electrophysiology clinic as a referral from Dr. Martines for atrial fibrillation and consideration of ICD for primary prevention sudden cardiac death.  Patient was not aware of any cardiac problems until he suffered a stroke in September 2024.  They admit to potentially feeling poorly for about 2 weeks prior to that.  [place] in [place] when he lost consciousness.  He does not remember anything else until after he woke up in the ambulance.  He was found to have a embolic stroke and underwent thrombectomy at [facility].  He was in atrial fibrillation and found to have a significant grade of left atrial appendage thrombus.  Was also found to have a reduced EF around 30% at that time.  Was started on anticoagulation and discharged.    He is overall recovered quite well from his stroke.  He has been following up with Dr. Martines in [place].  Most recent echo in December 2024 showed an EF of 31 to 35%.  He is scheduled for coronary CTA which is pending.  Has remained in atrial fibrillation since then.  Uncertain about his degree of rate control.  Today he is quite  tachycardic.  Has been started on GDMT.    On a day-to-day basis he is feeling all right.  He has lost about 50 pounds after his stroke unintentionally.  Still continues to have significant fatigue.  Mild shortness of breath on exertion.  No syncopal events.    Past Medical History:   Diagnosis Date    Atrial fibrillation     Diabetes mellitus        Past Surgical History:   Procedure Laterality Date    KNEE SURGERY         Family History   Problem Relation Age of Onset    Heart attack Father     Heart attack Paternal Uncle        Social History     Socioeconomic History    Marital status:    Tobacco Use    Smoking status: Former     Current packs/day: 1.00     Average packs/day: 1 pack/day for 25.2 years (25.2 ttl pk-yrs)     Types: Cigarettes     Start date: 2000    Smokeless tobacco: Never   Vaping Use    Vaping status: Never Used   Substance and Sexual Activity    Alcohol use: Never    Drug use: Never    Sexual activity: Defer         Current Outpatient Medications:     albuterol sulfate  (90 Base) MCG/ACT inhaler, Inhale 2 puffs Every 4 (Four) Hours As Needed for Wheezing., Disp: 8 g, Rfl: 2    apixaban (ELIQUIS) 5 MG tablet tablet, Take 1 tablet by mouth 2 (Two) Times a Day., Disp: 56 tablet, Rfl: 0    atorvastatin (LIPITOR) 40 MG tablet, Take 1 tablet by mouth Daily., Disp: 90 tablet, Rfl: 3    clopidogrel (Plavix) 75 MG tablet, Take 1 tablet by mouth Daily., Disp: 30 tablet, Rfl: 6    empagliflozin (JARDIANCE) 10 MG tablet tablet, Take 1 tablet by mouth Daily for 180 days., Disp: 30 tablet, Rfl: 5    Fluticasone-Salmeterol (Wixela Inhub) 250-50 MCG/ACT DISKUS, Inhale 1 puff 2 (Two) Times a Day., Disp: 60 each, Rfl: 5    furosemide (Lasix) 20 MG tablet, Take 1 tablet by mouth Daily As Needed (Leg swelling)., Disp: 30 tablet, Rfl: 0    metoprolol succinate XL (TOPROL-XL) 100 MG 24 hr tablet, Take 1 tablet by mouth Daily., Disp: 60 tablet, Rfl: 3    sacubitril-valsartan (Entresto) 24-26 MG  "tablet, Take 1 tablet by mouth 2 (Two) Times a Day for 180 days., Disp: 180 tablet, Rfl: 1    spironolactone (ALDACTONE) 25 MG tablet, Take 0.5 tablets by mouth Daily., Disp: 15 tablet, Rfl: 3    Vericiguat 2.5 MG tablet, Take 1 tablet by mouth Daily for 180 days., Disp: 90 tablet, Rfl: 1    Allergies:   No Known Allergies    Objective   Vital Signs: Blood pressure 116/56, pulse (!) 132, height 198.1 cm (78\"), weight 104 kg (230 lb), SpO2 98%.    PHYSICAL EXAM  General appearance: Awake, alert, cooperative  Head: Normocephalic, without obvious abnormality, atraumatic  Neck: No JVD  Lungs: Clear to ascultation bilaterally  Heart: Regular rate and rhythm, no murmurs, 2+ LE pulses, no lower extremity swelling  Skin: Skin color, turgor normal, no rashes or lesions  Neurologic: Grossly normal     Lab Results   Component Value Date    GLUCOSE 107 (H) 02/07/2025    CALCIUM 9.9 02/07/2025     02/07/2025    K 4.7 02/07/2025    CO2 23.9 02/07/2025     02/07/2025    BUN 17 02/07/2025    CREATININE 1.25 02/07/2025    BCR 13.6 02/07/2025    ANIONGAP 10.1 02/07/2025     Lab Results   Component Value Date    WBC 11.24 (H) 02/07/2025    HGB 17.1 02/07/2025    HCT 51.2 (H) 02/07/2025    MCV 85.0 02/07/2025     02/07/2025     Lab Results   Component Value Date    INR 1.28 (H) 12/13/2024    INR 1.1 09/13/2024    PROTIME 16.1 (H) 12/13/2024    PROTIME 11.4 09/13/2024     Lab Results   Component Value Date    TSH 1.770 12/13/2024          Results for orders placed during the hospital encounter of 12/23/24    Adult Transthoracic Echo Limited W/ Cont if Necessary Per Protocol    Interpretation Summary    This is a limited echocardiogram to look for LVEF.  No color Doppler studies and hemodynamic valvular assessments were done.  Right side of the heart was not well-visualized.    Left ventricular systolic function is moderately decreased. Left ventricular ejection fraction appears to be 31 - 35%.    No significant " changes in LVEF when compared to previous SHARYN from 9/17/2024.         I personally viewed and interpreted the patient's EKG/Telemetry/lab data    Procedures    Anshul Andrews  reports that he has quit smoking. His smoking use included cigarettes. He started smoking about 25 years ago. He has a 25.2 pack-year smoking history. He has never used smokeless tobacco.          Advance Care Planning   Advance Care Planning: ACP discussion was held with the patient during this visit. Patient does not have an advance directive, information provided.     Assessment & Plan    1. Persistent atrial fibrillation  Patient is referred for persistent atrial fibrillation.  The exact onset of this is uncertain, but he has been persistently in atrial fibrillation since at least September 2024.  His rates also potentially are above goal.  This may be contributing to his cardiomyopathy.  We discussed the pathophysiology of atrial fibrillation as well as treatment options going forward.  Given his cardiomyopathy, I think he deserves a good shot at rhythm control.  We discussed starting a medicine such as Tikosyn versus a catheter ablation.  For the latter we discussed how this is performed including the likely of success of potential risks.  Given his persistent atrial fibrillation I quoted him about a 50 to 60% chance of maintaining sinus rhythm for at least 1 year with a single ablation, and that additional ablations may also be needed.    After this discussion he is quite open to proceeding with an ablation which I think is a good idea.  Prior to this, we will need to get a repeat transesophageal echo to ensure that his left atrial appendage thrombus has resolved.  I will discuss this with Dr. Martines.  For now we will continue Eliquis.  Will start the scheduling for an ablation assuming that his appendage thrombus has resolved.    2. Cardiomyopathy, unspecified type  He has a history of unspecified cardiomyopathy at this point.  Most  recent ejection fraction is 30 to 35%.  He is scheduled for a CT angiogram of his coronary arteries in a couple of weeks.  This will help further determine whether this is ischemic first nonischemic.  He does have a strong family history of coronary disease as well as multiple risk factors.  Discussion about defibrillator for primary prevention was previously held with the patient by Dr. Martines.  Given his borderline ejection fraction, I think you merits a chance at maintaining sinus rhythm and seeing if his ejection fraction improves prior to committing him to a permanent defibrillator.  He is in agreement with this.  Will continue to assess.       Follow Up:  No follow-ups on file.      Thank you for allowing me to participate in the care of your patient. Please do not hesitate to contact me with additional questions or concerns.      Alen Duke M.D.  Cardiac Electrophysiologist  [facility] / [facility]

## 2025-03-07 ENCOUNTER — OFFICE VISIT (OUTPATIENT)
Dept: CARDIOLOGY | Facility: CLINIC | Age: 70
End: 2025-03-07
Payer: MEDICARE

## 2025-03-07 VITALS
DIASTOLIC BLOOD PRESSURE: 56 MMHG | HEIGHT: 78 IN | HEART RATE: 132 BPM | WEIGHT: 230 LBS | OXYGEN SATURATION: 98 % | BODY MASS INDEX: 26.61 KG/M2 | SYSTOLIC BLOOD PRESSURE: 116 MMHG

## 2025-03-07 DIAGNOSIS — I48.19 ATRIAL FIBRILLATION, PERSISTENT: Primary | ICD-10-CM

## 2025-03-07 DIAGNOSIS — I63.441 CEREBROVASCULAR ACCIDENT (CVA) DUE TO EMBOLISM OF RIGHT CEREBELLAR ARTERY: ICD-10-CM

## 2025-03-07 DIAGNOSIS — I50.21 ACUTE HFREF (HEART FAILURE WITH REDUCED EJECTION FRACTION): ICD-10-CM

## 2025-03-07 DIAGNOSIS — I48.19 PERSISTENT ATRIAL FIBRILLATION: Primary | ICD-10-CM

## 2025-03-07 DIAGNOSIS — I42.9 CARDIOMYOPATHY, UNSPECIFIED TYPE: ICD-10-CM

## 2025-03-10 ENCOUNTER — TELEPHONE (OUTPATIENT)
Dept: CARDIOLOGY | Facility: CLINIC | Age: 70
End: 2025-03-10
Payer: MEDICARE

## 2025-03-10 DIAGNOSIS — I48.19 PERSISTENT ATRIAL FIBRILLATION: Primary | ICD-10-CM

## 2025-03-10 DIAGNOSIS — I51.3 THROMBUS OF LEFT ATRIAL APPENDAGE: Primary | ICD-10-CM

## 2025-03-10 NOTE — PROGRESS NOTES
Alen Duke MD Edelen, Justina, RN; Trang Styles  Can we set him up for PFA.  No meds to hold.  We did need to make sure that he gets a transesophageal echo at [facility] prior to the procedure date.  I sent a message to Dr. Martines about scheduling this.

## 2025-03-13 ENCOUNTER — PREP FOR SURGERY (OUTPATIENT)
Dept: OTHER | Facility: HOSPITAL | Age: 70
End: 2025-03-13
Payer: MEDICARE

## 2025-03-13 DIAGNOSIS — I48.19 ATRIAL FIBRILLATION, PERSISTENT: Primary | ICD-10-CM

## 2025-03-13 RX ORDER — ONDANSETRON 2 MG/ML
4 INJECTION INTRAMUSCULAR; INTRAVENOUS EVERY 6 HOURS PRN
OUTPATIENT
Start: 2025-03-13

## 2025-03-13 RX ORDER — SODIUM CHLORIDE 9 MG/ML
40 INJECTION, SOLUTION INTRAVENOUS AS NEEDED
OUTPATIENT
Start: 2025-03-13

## 2025-03-13 RX ORDER — ACETAMINOPHEN 325 MG/1
650 TABLET ORAL EVERY 4 HOURS PRN
OUTPATIENT
Start: 2025-03-13

## 2025-03-13 RX ORDER — NITROGLYCERIN 0.4 MG/1
0.4 TABLET SUBLINGUAL
OUTPATIENT
Start: 2025-03-13

## 2025-03-14 ENCOUNTER — TELEPHONE (OUTPATIENT)
Dept: CARDIOLOGY | Facility: CLINIC | Age: 70
End: 2025-03-14

## 2025-03-14 LAB
CV ZIO AF AVG BPM: 102 BPM
CV ZIO AF BPM HIGH: 215 BPM
CV ZIO AF BPM LOW: 49 BPM
CV ZIO AF F EPI AVG BPM: 146 BPM
CV ZIO AF F EPI BPM HIGH: 215 BPM
CV ZIO AF F EPI BPM LOW: 95 BPM
CV ZIO AF F EPI DT: NORMAL
CV ZIO AF PERCENT: 100 %
CV ZIO AF S EPI AVG BPM: 78 BPM
CV ZIO AF S EPI BPM HIGH: 138 BPM
CV ZIO AF S EPI BPM LOW: 49 BPM
CV ZIO AF S EPI DT: NORMAL
CV ZIO BASELINE AVG BPM: 102 BPM
CV ZIO BASELINE BPM HIGH: 231 BPM
CV ZIO BASELINE BPM LOW: 49 BPM
CV ZIO DEVICE ANALYSIS TIME: NORMAL
CV ZIO ECT SVE COUNT: 0 EPISODES
CV ZIO ECT SVE CPLT COUNT: 0 EPISODES
CV ZIO ECT SVE CPLT FREQ: 0
CV ZIO ECT SVE FREQ: 0
CV ZIO ECT SVE TPLT COUNT: 0 EPISODES
CV ZIO ECT SVE TPLT FREQ: 0
CV ZIO ECT VE COUNT: 7212 EPISODES
CV ZIO ECT VE CPLT COUNT: 195 EPISODES
CV ZIO ECT VE CPLT FREQ: NORMAL
CV ZIO ECT VE FREQ: NORMAL
CV ZIO ECT VE TPLT COUNT: 14 EPISODES
CV ZIO ECT VE TPLT FREQ: NORMAL
CV ZIO ECTOPIC SVE COUPLET RAW PERCENT: 0 %
CV ZIO ECTOPIC SVE ISOLATED PERCENT: 0 %
CV ZIO ECTOPIC SVE TRIPLET RAW PERCENT: 0 %
CV ZIO ECTOPIC VE COUPLET RAW PERCENT: 0.13 %
CV ZIO ECTOPIC VE ISOLATED PERCENT: 2.42 %
CV ZIO ECTOPIC VE TRIPLET RAW PERCENT: 0.01 %
CV ZIO ENROLLMENT END: NORMAL
CV ZIO ENROLLMENT START: NORMAL
CV ZIO IRREG TYPE: NORMAL
CV ZIO L BIGEMINY DUR: 4.1 SEC
CV ZIO L BIGEMINY END: NORMAL
CV ZIO L BIGEMINY START: NORMAL
CV ZIO L TRIGEMINY DUR: 6 SEC
CV ZIO L TRIGEMINY END: NORMAL
CV ZIO L TRIGEMINY START: NORMAL
CV ZIO PATIENT EVENTS DIARIES: 0
CV ZIO PATIENT EVENTS TRIGGERS: 0
CV ZIO PAUSE COUNT: 0
CV ZIO PRESCRIPTION STATUS: NORMAL
CV ZIO SVT COUNT: 0
CV ZIO TOTAL  ENROLLMENT PERIOD: NORMAL
CV ZIO VT AVG BPM: 206 BPM
CV ZIO VT BPM HIGH: 231 BPM
CV ZIO VT BPM LOW: 179 BPM
CV ZIO VT COUNT: 1
CV ZIO VT F EPI AVG BPM: 206
CV ZIO VT F EPI BEATS: 6 BEATS
CV ZIO VT F EPI BPM HIGH: 231
CV ZIO VT F EPI BPM LOW: 179
CV ZIO VT F EPI DUR: 1.7 SEC
CV ZIO VT F EPI END: NORMAL
CV ZIO VT F EPI START: NORMAL
CV ZIO VT L EPI AVG BPM: 206
CV ZIO VT L EPI BEATS: 6 BEATS
CV ZIO VT L EPI BPM HIGH: 231 BPM
CV ZIO VT L EPI BPM LOW: 179 BPM
CV ZIO VT L EPI DUR: 1.7
CV ZIO VT L EPI END: NORMAL
CV ZIO VT L EPI START: NORMAL

## 2025-03-17 ENCOUNTER — ANESTHESIA EVENT (OUTPATIENT)
Dept: CARDIOLOGY | Facility: HOSPITAL | Age: 70
End: 2025-03-17
Payer: MEDICARE

## 2025-03-17 ENCOUNTER — ANESTHESIA (OUTPATIENT)
Dept: CARDIOLOGY | Facility: HOSPITAL | Age: 70
End: 2025-03-17
Payer: MEDICARE

## 2025-03-17 ENCOUNTER — HOSPITAL ENCOUNTER (OUTPATIENT)
Dept: CARDIOLOGY | Facility: HOSPITAL | Age: 70
Discharge: HOME | End: 2025-03-17
Payer: MEDICARE

## 2025-03-17 VITALS
DIASTOLIC BLOOD PRESSURE: 71 MMHG | HEIGHT: 78 IN | OXYGEN SATURATION: 99 % | RESPIRATION RATE: 16 BRPM | WEIGHT: 230 LBS | TEMPERATURE: 97.9 F | HEART RATE: 93 BPM | SYSTOLIC BLOOD PRESSURE: 117 MMHG | BODY MASS INDEX: 26.61 KG/M2

## 2025-03-17 DIAGNOSIS — I51.3 THROMBUS OF LEFT ATRIAL APPENDAGE: ICD-10-CM

## 2025-03-17 PROCEDURE — 93325 DOPPLER ECHO COLOR FLOW MAPG: CPT

## 2025-03-17 PROCEDURE — 25010000002 PROPOFOL 200 MG/20ML EMULSION: Performed by: NURSE ANESTHETIST, CERTIFIED REGISTERED

## 2025-03-17 PROCEDURE — 93321 DOPPLER ECHO F-UP/LMTD STD: CPT

## 2025-03-17 RX ORDER — PROPOFOL 10 MG/ML
INJECTION, EMULSION INTRAVENOUS AS NEEDED
Status: DISCONTINUED | OUTPATIENT
Start: 2025-03-17 | End: 2025-03-17 | Stop reason: SURG

## 2025-03-17 RX ADMIN — PROPOFOL 30 MG: 10 INJECTION, EMULSION INTRAVENOUS at 10:14

## 2025-03-17 RX ADMIN — PROPOFOL 60 MG: 10 INJECTION, EMULSION INTRAVENOUS at 10:12

## 2025-03-17 RX ADMIN — PROPOFOL 30 MG: 10 INJECTION, EMULSION INTRAVENOUS at 10:15

## 2025-03-17 RX ADMIN — PROPOFOL 30 MG: 10 INJECTION, EMULSION INTRAVENOUS at 10:13

## 2025-03-17 NOTE — ANESTHESIA PREPROCEDURE EVALUATION
Anesthesia Evaluation     Patient summary reviewed and Nursing notes reviewed   no history of anesthetic complications:   NPO Solid Status: > 8 hours  NPO Liquid Status: > 8 hours           Airway   Mallampati: I  TM distance: >3 FB  Neck ROM: full  No difficulty expected  Dental    (+) poor dentition    Pulmonary - normal exam   (+) ,sleep apnea  Cardiovascular     ECG reviewed  PT is on anticoagulation therapy  Patient on routine beta blocker  Rhythm: irregular  Rate: abnormal    (+) dysrhythmias Atrial Fib      Neuro/Psych  (+) CVA  GI/Hepatic/Renal/Endo    (+) diabetes mellitus    Musculoskeletal     Abdominal  - normal exam    Bowel sounds: normal.   Substance History      OB/GYN          Other                      Anesthesia Plan    ASA 3     general   total IV anesthesia  intravenous induction     Anesthetic plan, risks, benefits, and alternatives have been provided, discussed and informed consent has been obtained with: patient.  Pre-procedure education provided  Plan discussed with attending.      CODE STATUS:

## 2025-03-18 LAB — BH CV ECHO MEAS - AO ROOT DIAM: 4.3 CM

## 2025-03-21 ENCOUNTER — HOSPITAL ENCOUNTER (OUTPATIENT)
Dept: CT IMAGING | Facility: HOSPITAL | Age: 70
Discharge: HOME | End: 2025-03-21
Payer: MEDICARE

## 2025-03-21 VITALS — HEART RATE: 67 BPM | SYSTOLIC BLOOD PRESSURE: 112 MMHG | DIASTOLIC BLOOD PRESSURE: 72 MMHG

## 2025-03-21 DIAGNOSIS — I48.19 ATRIAL FIBRILLATION, PERSISTENT: ICD-10-CM

## 2025-03-21 DIAGNOSIS — I50.21 ACUTE HFREF (HEART FAILURE WITH REDUCED EJECTION FRACTION): ICD-10-CM

## 2025-03-21 PROCEDURE — 75574 CT ANGIO HRT W/3D IMAGE: CPT

## 2025-03-21 PROCEDURE — 63710000001 NITROGLYCERIN 0.4 MG SUBLINGUAL TABLET: Performed by: INTERNAL MEDICINE

## 2025-03-21 PROCEDURE — A9270 NON-COVERED ITEM OR SERVICE: HCPCS | Performed by: INTERNAL MEDICINE

## 2025-03-21 PROCEDURE — 25510000001 IOPAMIDOL PER 1 ML: Performed by: INTERNAL MEDICINE

## 2025-03-21 RX ORDER — IOPAMIDOL 755 MG/ML
100 INJECTION, SOLUTION INTRAVASCULAR
Status: COMPLETED | OUTPATIENT
Start: 2025-03-21 | End: 2025-03-21

## 2025-03-21 RX ORDER — NITROGLYCERIN 0.4 MG/1
0.4 TABLET SUBLINGUAL
Status: COMPLETED | OUTPATIENT
Start: 2025-03-21 | End: 2025-03-21

## 2025-03-21 RX ADMIN — IOPAMIDOL 92 ML: 755 INJECTION, SOLUTION INTRAVENOUS at 12:51

## 2025-03-21 RX ADMIN — NITROGLYCERIN 0.4 MG: 0.4 TABLET SUBLINGUAL at 12:52

## 2025-03-22 NOTE — ANESTHESIA POSTPROCEDURE EVALUATION
Patient: Anshul Andrews    Procedure Summary       Date: 03/17/25 Room / Location: [facility] NONINVASIVE LAB    Anesthesia Start: 1009 Anesthesia Stop: 1030    Procedure: ADULT TRANSESOPHAGEAL ECHO (SHARYN) W/ CONT IF NECESSARY PER PROTOCOL Diagnosis:       Thrombus of left atrial appendage      (Cardiac Source of Emboli)    Scheduled Providers: Josh Martines MD Provider: Sourav Alcantar CRNA    Anesthesia Type: general ASA Status: 3            Anesthesia Type: general    Vitals  Vitals Value Taken Time   /72 03/21/25 12:45   Temp     Pulse 67 03/21/25 12:45   Resp 16 03/17/25 10:50   SpO2 99 % 03/17/25 10:50           Post Anesthesia Care and Evaluation    Patient location during evaluation: PHASE II  Patient participation: complete - patient participated  Level of consciousness: awake and alert  Pain score: 0  Pain management: adequate    Airway patency: patent  Anesthetic complications: No anesthetic complications  PONV Status: controlled  Cardiovascular status: acceptable  Respiratory status: acceptable  Hydration status: acceptable

## 2025-03-25 DIAGNOSIS — I48.19 ATRIAL FIBRILLATION, PERSISTENT: Chronic | ICD-10-CM

## 2025-03-25 RX ORDER — APIXABAN 5 MG/1
TABLET, FILM COATED ORAL
Qty: 60 TABLET | Refills: 0 | Status: SHIPPED | OUTPATIENT
Start: 2025-03-25

## 2025-03-27 ENCOUNTER — DISEASE STATE MANAGEMENT VISIT (OUTPATIENT)
Dept: CARDIOLOGY | Facility: HOSPITAL | Age: 70
End: 2025-03-27
Payer: MEDICARE

## 2025-03-27 ENCOUNTER — TELEPHONE (OUTPATIENT)
Dept: FAMILY MEDICINE CLINIC | Facility: CLINIC | Age: 70
End: 2025-03-27
Payer: MEDICARE

## 2025-03-28 ENCOUNTER — OFFICE VISIT (OUTPATIENT)
Dept: CARDIOLOGY | Facility: CLINIC | Age: 70
End: 2025-03-28
Payer: MEDICARE

## 2025-03-28 VITALS
HEART RATE: 56 BPM | DIASTOLIC BLOOD PRESSURE: 58 MMHG | SYSTOLIC BLOOD PRESSURE: 107 MMHG | BODY MASS INDEX: 26.43 KG/M2 | WEIGHT: 228.4 LBS | RESPIRATION RATE: 18 BRPM | HEIGHT: 78 IN | OXYGEN SATURATION: 95 %

## 2025-03-28 DIAGNOSIS — I63.412 CEREBROVASCULAR ACCIDENT (CVA) DUE TO EMBOLISM OF LEFT MIDDLE CEREBRAL ARTERY: ICD-10-CM

## 2025-03-28 DIAGNOSIS — R73.03 PREDIABETES: ICD-10-CM

## 2025-03-28 DIAGNOSIS — Z79.01 CHRONIC ANTICOAGULATION: ICD-10-CM

## 2025-03-28 DIAGNOSIS — I50.22 CHRONIC HFREF (HEART FAILURE WITH REDUCED EJECTION FRACTION): ICD-10-CM

## 2025-03-28 DIAGNOSIS — R93.1 ABNORMAL CARDIAC CT ANGIOGRAPHY: ICD-10-CM

## 2025-03-28 DIAGNOSIS — I51.3 THROMBUS OF LEFT ATRIAL APPENDAGE: ICD-10-CM

## 2025-03-28 DIAGNOSIS — I48.19 ATRIAL FIBRILLATION, PERSISTENT: Primary | ICD-10-CM

## 2025-03-28 DIAGNOSIS — R06.09 DOE (DYSPNEA ON EXERTION): ICD-10-CM

## 2025-03-28 DIAGNOSIS — I47.29 NSVT (NONSUSTAINED VENTRICULAR TACHYCARDIA): ICD-10-CM

## 2025-03-28 RX ORDER — RANOLAZINE 500 MG/1
500 TABLET, EXTENDED RELEASE ORAL 2 TIMES DAILY
Qty: 60 TABLET | Refills: 3 | Status: SHIPPED | OUTPATIENT
Start: 2025-03-28

## 2025-03-28 NOTE — PROGRESS NOTES
Rosaura Guzman, CHARISSE  No ref. provider found    Anshul Andrews  2025    Subjective     Anshul Andrews is a 69 y.o. male who presents today to [facility] for Chronic anticoagulation.    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      Patient reports that he went to [facility] after having an episode of syncope where he was found to have a right MCA stroke.  He underwent thrombectomy.  Subsequent workup included a SHARYN which showed multiple thrombi in his left atrial appendage.  There was a discussion about embolectomy for left atrial appendage clots but given the risk of the procedure it was not done and he was kept on anticoagulation.  Patient was transferred to [facility] inpatient rehab where he completed his rehab.                   History of Present Illness  The patient is a 69-year-old male presenting for evaluation of CT scan results.    He reports no chest pain but experiences fatigue and shortness of breath, which he attributes to his cardiac condition. He notes decreased energy levels, particularly during exertion such as walking. He has a family history of cardiac issues, with his father having undergone bypass surgery and his grandfather having  of a heart attack at 59. His father passed away at 60.      No Known Allergies:    Current Outpatient Medications:     albuterol sulfate  (90 Base) MCG/ACT inhaler, Inhale 2 puffs Every 4 (Four) Hours As Needed for Wheezing., Disp: 8 g, Rfl: 2    atorvastatin (LIPITOR) 40 MG tablet, Take 1  tablet by mouth Daily., Disp: 90 tablet, Rfl: 3    clopidogrel (Plavix) 75 MG tablet, Take 1 tablet by mouth Daily., Disp: 30 tablet, Rfl: 6    Eliquis 5 MG tablet tablet, TAKE 1 TABLET BY MOUTH EVERY 12 HOURS FOR ATRIAL FIBRILLATION., Disp: 60 tablet, Rfl: 0    empagliflozin (JARDIANCE) 10 MG tablet tablet, Take 1 tablet by mouth Daily for 30 days., Disp: 30 tablet, Rfl: 3    empagliflozin (JARDIANCE) 10 MG tablet tablet, Take 1 tablet by mouth Daily for 180 days., Disp: 30 tablet, Rfl: 5    Fluticasone-Salmeterol (Wixela Inhub) 250-50 MCG/ACT DISKUS, Inhale 1 puff 2 (Two) Times a Day., Disp: 60 each, Rfl: 5    furosemide (Lasix) 20 MG tablet, Take 1 tablet by mouth Daily As Needed (Leg swelling)., Disp: 30 tablet, Rfl: 0    metoprolol succinate XL (TOPROL-XL) 100 MG 24 hr tablet, Take 1 tablet by mouth Daily., Disp: 60 tablet, Rfl: 3    sacubitril-valsartan (Entresto) 24-26 MG tablet, Take 1 tablet by mouth 2 (Two) Times a Day for 180 days., Disp: 180 tablet, Rfl: 1    spironolactone (ALDACTONE) 25 MG tablet, Take 0.5 tablets by mouth Daily., Disp: 15 tablet, Rfl: 3    Vericiguat 2.5 MG tablet, Take 1 tablet by mouth Daily for 180 days., Disp: 90 tablet, Rfl: 1    ranolazine (RANEXA) 500 MG 12 hr tablet, Take 1 tablet by mouth 2 (Two) Times a Day., Disp: 60 tablet, Rfl: 3    Past Medical History:   Diagnosis Date    Atrial fibrillation     Diabetes mellitus      Past Surgical History:   Procedure Laterality Date    KNEE SURGERY       Family History   Problem Relation Age of Onset    Heart attack Father     Heart attack Paternal Uncle      Social History     Tobacco Use    Smoking status: Former     Current packs/day: 1.00     Average packs/day: 1 pack/day for 25.2 years (25.2 ttl pk-yrs)     Types: Cigarettes     Start date: 2000    Smokeless tobacco: Never   Vaping Use    Vaping status: Never Used   Substance Use Topics    Alcohol use: Never    Drug use: Never       Objective   Blood pressure 107/58, pulse 56,  "resp. rate 18, height 198.1 cm (78\"), weight 104 kg (228 lb 6.4 oz), SpO2 95%.  Body mass index is 26.39 kg/m².    Constitutional:       Appearance: Not in distress.   Pulmonary:      Effort: Pulmonary effort is normal.      Breath sounds: Normal breath sounds.   Cardiovascular:      Tachycardia present. Irregularly irregular rhythm. Normal S1. Normal S2.       Murmurs: There is no murmur.   Edema:     Peripheral edema absent.   Skin:     General: Skin is warm.   Neurological:      General: No focal deficit present.           DATA:   Results for orders placed during the hospital encounter of 03/17/25    Adult Transesophageal Echo (SHARYN) W/ Cont if Necessary Per Protocol    Interpretation Summary    Left ventricular ejection fraction appears to be 36 - 40%.    Doppler interrogation shows normal flow within the left atrial appendage. No evidence of a left atrial appendage thrombus was present.    The left atrial cavity is dilated.    Saline test results are negative for right to left atrial level shunt.    The right atrial cavity is dilated.    Mild dilation of the aortic root is present measuring 4.3 cm.    Trace mitral valve regurgitation is present    There is no evidence of pericardial effusion          No results found for: \"BNP\"  No results found for: \"PSA\"   Lab Results   Component Value Date    MG 2.3 01/06/2025     Lab Results   Component Value Date    INR 1.28 (H) 12/13/2024    INR 1.1 09/13/2024     No results found for: \"CKTOTAL\"  Lab Results   Component Value Date    CHOL 145 02/07/2025     Lab Results   Component Value Date    TRIG 204 (H) 02/07/2025     Lab Results   Component Value Date    HDL 37 (L) 02/07/2025     Lab Results   Component Value Date    LDL 74 02/07/2025     No components found for: \"A1C\"      Lab Results   Component Value Date    TSH 1.770 12/13/2024             Invalid input(s): \"LABALBU\", \"PROT\"        Results review: During today's encounter, all relevant clinical data has been " reviewed.        ECG 12 Lead    Date/Time: 3/28/2025 10:38 AM  Performed by: Josh Martines MD    Authorized by: Josh Martines MD  Rhythm: atrial fibrillation  Rate: tachycardic    Clinical impression: abnormal EKG          Assessment & Plan    Diagnosis Plan   1. Atrial fibrillation, persistent  ECG 12 Lead      2. Cerebrovascular accident (CVA) due to embolism of left middle cerebral artery        3. Chronic HFrEF (heart failure with reduced ejection fraction)  Case Request Cath Lab: Left Heart Cath      4. Chronic anticoagulation        5. Prediabetes        6. history of thrombus of left atrial appendage        7. NSVT (nonsustained ventricular tachycardia)  Case Request Cath Lab: Left Heart Cath      8. SHERIFF (dyspnea on exertion)  Case Request Cath Lab: Left Heart Cath    ranolazine (RANEXA) 500 MG 12 hr tablet      9. Abnormal cardiac CT angiography  Case Request Cath Lab: Left Heart Cath        Assessment & Plan  1.  Obstructive coronary artery disease/chronic HFrEF, could be ischemic cardiomyopathy given the result of the CT scan/NSVT/persistent A-fib  CT coronary angiogram done on 3/21/2025 showing 75% stenosis in the proximal LAD with a calcified plaque likely contributing to fatigue and shortness of breath with exertion. The blockage is in the early part of the vessel, making it more concerning. Discussed risks and benefits of cardiac catheterization, involving catheter insertion through the radial artery and administration of iodine dye to visualize coronary arteries under fluoroscopy. Discussed possibility of stent placement. Advised to seek immediate medical attention if experiencing new chest pain or dyspnea. Prescribed ranolazine (Ranexa) to help with symptoms. Continue aspirin and Lipitor.  -Recent transesophageal echocardiogram showing resolution of the left radial appendage thrombus.  Updated Dr. Duke about the resolution of the thrombus.  It is currently scheduled for A-fib ablation with  Dr. Duke in May.     Recommendations  Orders Placed This Encounter   Procedures    ECG 12 Lead        New Medications:   New Medications Ordered This Visit   Medications    ranolazine (RANEXA) 500 MG 12 hr tablet     Sig: Take 1 tablet by mouth 2 (Two) Times a Day.     Dispense:  60 tablet     Refill:  3       Discontinued Medications:   There are no discontinued medications.     Return in about 2 months (around 5/28/2025).    Patient or patient representative verbalized consent for the use of Ambient Listening during the visit with  Josh Martines MD for chart documentation. 3/28/2025  13:43 EDT      Thank you for allowing me to participate in the care of Anshul Andrews. Feel free to contact me directly with any further questions or concerns.        This document has been electronically signed by Josh Martines MD   March 28, 2025 13:43 EDT    Dictated Utilizing Dragon Dictation: Part of this note may be an electronic transcription/translation of spoken language to printed text using the Dragon Dictation System.

## 2025-04-04 DIAGNOSIS — G47.30 SLEEP APNEA, UNSPECIFIED TYPE: Chronic | ICD-10-CM

## 2025-04-07 ENCOUNTER — HOSPITAL ENCOUNTER (OUTPATIENT)
Dept: CARDIOLOGY | Facility: HOSPITAL | Age: 70
Discharge: HOME | End: 2025-04-07
Admitting: PHYSICIAN ASSISTANT
Payer: MEDICARE

## 2025-04-07 VITALS
SYSTOLIC BLOOD PRESSURE: 109 MMHG | HEART RATE: 73 BPM | BODY MASS INDEX: 26.75 KG/M2 | HEIGHT: 78 IN | WEIGHT: 231.2 LBS | OXYGEN SATURATION: 97 % | DIASTOLIC BLOOD PRESSURE: 62 MMHG

## 2025-04-07 DIAGNOSIS — R53.83 OTHER FATIGUE: ICD-10-CM

## 2025-04-07 DIAGNOSIS — I50.22 CHRONIC HFREF (HEART FAILURE WITH REDUCED EJECTION FRACTION): Primary | ICD-10-CM

## 2025-04-07 DIAGNOSIS — I10 ESSENTIAL HYPERTENSION: ICD-10-CM

## 2025-04-07 DIAGNOSIS — I48.19 ATRIAL FIBRILLATION, PERSISTENT: ICD-10-CM

## 2025-04-07 PROBLEM — M19.90 ARTHRITIS: Status: ACTIVE | Noted: 2025-04-07

## 2025-04-07 LAB
ABSOLUTE LUNG FLUID CONTENT: 29 % (ref 20–35)
ANION GAP SERPL CALCULATED.3IONS-SCNC: 11.9 MMOL/L (ref 5–15)
BUN SERPL-MCNC: 22 MG/DL (ref 8–23)
BUN/CREAT SERPL: 19.1 (ref 7–25)
CALCIUM SPEC-SCNC: 9.1 MG/DL (ref 8.6–10.5)
CHLORIDE SERPL-SCNC: 108 MMOL/L (ref 98–107)
CO2 SERPL-SCNC: 24.1 MMOL/L (ref 22–29)
CREAT SERPL-MCNC: 1.15 MG/DL (ref 0.76–1.27)
EGFRCR SERPLBLD CKD-EPI 2021: 68.9 ML/MIN/1.73
GLUCOSE SERPL-MCNC: 87 MG/DL (ref 65–99)
MAGNESIUM SERPL-MCNC: 2 MG/DL (ref 1.6–2.4)
NT-PROBNP SERPL-MCNC: 345.5 PG/ML (ref 0–900)
POTASSIUM SERPL-SCNC: 4.4 MMOL/L (ref 3.5–5.2)
SODIUM SERPL-SCNC: 144 MMOL/L (ref 136–145)

## 2025-04-07 PROCEDURE — 36415 COLL VENOUS BLD VENIPUNCTURE: CPT | Performed by: PHYSICIAN ASSISTANT

## 2025-04-07 PROCEDURE — 83735 ASSAY OF MAGNESIUM: CPT | Performed by: PHYSICIAN ASSISTANT

## 2025-04-07 PROCEDURE — 80048 BASIC METABOLIC PNL TOTAL CA: CPT | Performed by: PHYSICIAN ASSISTANT

## 2025-04-07 PROCEDURE — 83880 ASSAY OF NATRIURETIC PEPTIDE: CPT | Performed by: PHYSICIAN ASSISTANT

## 2025-04-07 PROCEDURE — 99214 OFFICE O/P EST MOD 30 MIN: CPT | Performed by: PHYSICIAN ASSISTANT

## 2025-04-07 PROCEDURE — G2211 COMPLEX E/M VISIT ADD ON: HCPCS | Performed by: PHYSICIAN ASSISTANT

## 2025-04-07 PROCEDURE — 94726 PLETHYSMOGRAPHY LUNG VOLUMES: CPT | Performed by: PHYSICIAN ASSISTANT

## 2025-04-07 RX ORDER — METOPROLOL SUCCINATE 100 MG/1
100 TABLET, EXTENDED RELEASE ORAL NIGHTLY
Qty: 60 TABLET | Refills: 3 | Status: SHIPPED | OUTPATIENT
Start: 2025-04-07

## 2025-04-07 NOTE — H&P (VIEW-ONLY)
[facility]  JOCELYN Dacosta Brandy Jean, APRN  [address] [place] [id number]    Thank you for asking me to see Anshul Andrews for congestive heart failure.    HPI:     This is a 70 y.o. male with known past medical history of:    HFrEF  SHARYN from 09/17/24 with EF around 30%; severe left atrial enlargement. Severe global hypokinetic left atrial appendage  TTE from 12/24/24 with EF 31-35%  SHARYN from 03/17/25 with EF 36-40% and with no known evidence of left atrial appendage thrombus present.   Paroxysmal atrial fibrillation  Right MCA stroke s/p thrombectomy @ [facility] in September 2024 felt to likely be cardioembolic at time of event per chart review  DM   Essential HTN  Polycythemia  Hx of cardiac thrombi   Tobacco abuse    Anshul Andrews presents for today for Heart Failure clinic evaluation.  The patient is typically seen by Rosaura Guzman APRN.  Patient's primary cardiologist is Dr. Martines.     Last known hospitalization and/or ED visit: Patient was initially hospitalized at the [facility] with right MCA stroke during which time he was found to have new onset atrial fibrillation as well as HFrEF with ejection fraction around 30%  ED visit in December 2024 on the 13th with afib with note reviewed and dc home from ED.   Accompanied by: Daughter        04/07/2025  visit data/details regarding:   Dyspnea: Denies dyspnea on exertion  Lower extremity swelling: Denies swelling of lower extremities  Abdominal swelling: Has abdominal distention  Home weight: Weight monitoring booklet provided during initial visit; provided an initial visit  Home BP: BP monitoring booklet provided during initial visit; pressure cuff provided in initial visit  Home heart rate: HR monitoring booklet provided during initial visit;  Daily activities of living: Performing independently  Pillows/lying flat: Now down to 1 pillow  HF zone: Yellow zone (due to  fatigue)  Mr. Andrews denies chest pain.  He is without significant shortness of breath or swelling in his extremities but reports he is plagued by fatigue.    No longer enjoys going to the store due to fatigue.     Blood pressures running okay at home mostly in the 100s-110s.  Weights are staying stable at home as well.        Review of Systems - Review of Systems   Constitutional: Positive for malaise/fatigue. Negative for decreased appetite, diaphoresis and fever.   HENT:  Negative for congestion and ear pain.    Eyes:  Negative for blurred vision.   Cardiovascular:  Negative for chest pain and dyspnea on exertion.   Respiratory:  Negative for cough and shortness of breath.    Endocrine: Negative for cold intolerance and polydipsia.   Hematologic/Lymphatic: Negative for adenopathy and bleeding problem.   Skin:  Negative for dry skin and nail changes.   Musculoskeletal:  Negative for arthritis and falls.   Gastrointestinal:  Negative for bloating, anorexia and nausea.   Genitourinary:  Negative for bladder incontinence and dysuria.   Neurological:  Negative for brief paralysis and difficulty with concentration.   Psychiatric/Behavioral:  Negative for altered mental status and hallucinations.          All other systems were reviewed and were negative.    Patient Active Problem List   Diagnosis    CVA (cerebral vascular accident)    Cardiac arrhythmia    Simple chronic bronchitis    Prediabetes    Atrial fibrillation    Sleep apnea    Chronic HFrEF (heart failure with reduced ejection fraction)    NSVT (nonsustained ventricular tachycardia)    SHERIFF (dyspnea on exertion)    Abnormal cardiac CT angiography    Arthritis    Essential hypertension       family history includes Heart attack in his father and paternal uncle.     reports that he has quit smoking. His smoking use included cigarettes. He started smoking about 25 years ago. He has a 25.3 pack-year smoking history. He has never used smokeless tobacco. He reports  that he does not drink alcohol and does not use drugs.    No Known Allergies      Current Outpatient Medications:     albuterol sulfate  (90 Base) MCG/ACT inhaler, Inhale 2 puffs Every 4 (Four) Hours As Needed for Wheezing., Disp: 8 g, Rfl: 2    atorvastatin (LIPITOR) 40 MG tablet, Take 1 tablet by mouth Daily., Disp: 90 tablet, Rfl: 3    clopidogrel (Plavix) 75 MG tablet, Take 1 tablet by mouth Daily., Disp: 30 tablet, Rfl: 6    Eliquis 5 MG tablet tablet, TAKE 1 TABLET BY MOUTH EVERY 12 HOURS FOR ATRIAL FIBRILLATION., Disp: 60 tablet, Rfl: 0    empagliflozin (JARDIANCE) 10 MG tablet tablet, Take 1 tablet by mouth Daily for 30 days., Disp: 30 tablet, Rfl: 3    Fluticasone-Salmeterol (Wixela Inhub) 250-50 MCG/ACT DISKUS, Inhale 1 puff 2 (Two) Times a Day., Disp: 60 each, Rfl: 5    furosemide (Lasix) 20 MG tablet, Take 1 tablet by mouth Daily As Needed (Leg swelling)., Disp: 30 tablet, Rfl: 0    metoprolol succinate XL (TOPROL-XL) 100 MG 24 hr tablet, Take 1 tablet by mouth Every Night., Disp: 60 tablet, Rfl: 3    ranolazine (RANEXA) 500 MG 12 hr tablet, Take 1 tablet by mouth 2 (Two) Times a Day., Disp: 60 tablet, Rfl: 3    sacubitril-valsartan (Entresto) 24-26 MG tablet, Take 1 tablet by mouth 2 (Two) Times a Day for 180 days., Disp: 180 tablet, Rfl: 1    spironolactone (ALDACTONE) 25 MG tablet, Take 0.5 tablets by mouth Daily., Disp: 15 tablet, Rfl: 3    Vericiguat 2.5 MG tablet, Take 1 tablet by mouth Daily for 180 days., Disp: 90 tablet, Rfl: 1      Physical Exam:  I have reviewed the patient's current vital signs as documented in the patient's EMR.   Vitals:    04/07/25 0908   BP: 109/62   Pulse: 73   SpO2: 97%           Body mass index is 26.72 kg/m².       04/07/25 0908   Weight: 105 kg (231 lb 3.2 oz)            Physical Exam  Vitals and nursing note reviewed.   Constitutional:       General: He is awake.      Appearance: Normal appearance. He is well-developed and well-groomed.   HENT:      Head:  Normocephalic and atraumatic.   Eyes:      General: Lids are normal.      Conjunctiva/sclera:      Right eye: Right conjunctiva is not injected.      Left eye: Left conjunctiva is not injected.   Cardiovascular:      Rate and Rhythm: Normal rate. Rhythm irregularly irregular.      Heart sounds:      No systolic murmur is present.      No diastolic murmur is present.   Pulmonary:      Effort: No tachypnea or bradypnea.      Breath sounds: No decreased breath sounds, wheezing or rhonchi.   Abdominal:      General: Bowel sounds are normal.      Palpations: Abdomen is soft.      Tenderness: There is no abdominal tenderness.   Musculoskeletal:      Right lower leg: No edema.      Left lower leg: No edema.   Neurological:      Mental Status: He is alert and oriented to person, place, and time.   Psychiatric:         Attention and Perception: Attention normal.         Mood and Affect: Mood normal.         Behavior: Behavior is cooperative.          JVP: Volume/Pulsation: Normal.        DATA REVIEWED:           ---------------------------------------------------  TTE/SHARYN:  Results for orders placed during the hospital encounter of 03/17/25    Adult Transesophageal Echo (SHARYN) W/ Cont if Necessary Per Protocol    Interpretation Summary    Left ventricular ejection fraction appears to be 36 - 40%.    Doppler interrogation shows normal flow within the left atrial appendage. No evidence of a left atrial appendage thrombus was present.    The left atrial cavity is dilated.    Saline test results are negative for right to left atrial level shunt.    The right atrial cavity is dilated.    Mild dilation of the aortic root is present measuring 4.3 cm.    Trace mitral valve regurgitation is present    There is no evidence of pericardial effusion              LAST HEART CATH RESULT/IF AVAILABLE:     No results found for this or any previous visit.      -----------------------------------------------------  CXR/Imaging:   Imaging  "Results (Most Recent)       None            I personally reviewed and interpreted the CXR.      -----------------------------------------------------  CT:   CT Angiogram Coronary  Result Date: 3/23/2025  1.  LAD calcium score of 192. The LAD proximally shows probably greater than 75% stenosis of the vessel secondary to calcific plaque. 2.  The RCA shows some mid level motion artifact. 3.  The circumflex demonstrates mild midlevel calcification but not well evaluated in some regions due to motion.  ASSESSMENT:   CAD RADS 4 consider ICA or functional assessment. P-3  This report was finalized on 3/23/2025 2:22 PM by Dr. Iain Ng MD.      I personally reviewed the images of the CT scan.  My personal interpretation is below.      ----------------------------------------------------      --------------------------------------------------------------------------------------------------    Laboratory evaluations:    Lab Results   Component Value Date    GLUCOSE 87 04/07/2025    BUN 22 04/07/2025    CREATININE 1.15 04/07/2025    BCR 19.1 04/07/2025    K 4.4 04/07/2025    CO2 24.1 04/07/2025    CALCIUM 9.1 04/07/2025    ALBUMIN 3.5 02/07/2025    AST 15 02/07/2025    ALT 21 02/07/2025     Lab Results   Component Value Date    WBC 11.24 (H) 02/07/2025    HGB 17.1 02/07/2025    HCT 51.2 (H) 02/07/2025    MCV 85.0 02/07/2025     02/07/2025     Lab Results   Component Value Date    CHOL 145 02/07/2025    TRIG 204 (H) 02/07/2025    HDL 37 (L) 02/07/2025    LDL 74 02/07/2025     Lab Results   Component Value Date    TSH 1.770 12/13/2024     Lab Results   Component Value Date    HGBA1C 5.90 (H) 02/07/2025     Lab Results   Component Value Date    ALT 21 02/07/2025     Lab Results   Component Value Date    HGBA1C 5.90 (H) 02/07/2025    HGBA1C 6.20 (H) 09/25/2024     Lab Results   Component Value Date    CREATININE 1.15 04/07/2025     No results found for: \"IRON\", \"TIBC\", \"FERRITIN\"  Lab Results   Component Value Date    " INR 1.28 (H) 12/13/2024    INR 1.1 09/13/2024    PROTIME 16.1 (H) 12/13/2024    PROTIME 11.4 09/13/2024        Lab Results   Component Value Date    ABSOLUTELUNG 29 04/07/2025    ABSOLUTELUNG 34 01/06/2025    ABSOLUTELUNG 28 11/19/2024           1. Chronic HFrEF (heart failure with reduced ejection fraction)    2. Essential hypertension    3. Atrial fibrillation, persistent          ORDERS PLACED TODAY:  Orders Placed This Encounter   Procedures    ReDs Vest    Basic Metabolic Panel    Magnesium    proBNP    Basic Metabolic Panel    Magnesium    proBNP        Diagnoses and all orders for this visit:    1. Chronic HFrEF (heart failure with reduced ejection fraction) (Primary)  -     Basic Metabolic Panel; Future  -     Magnesium; Future  -     proBNP; Future  -     Basic Metabolic Panel; Standing  -     Basic Metabolic Panel  -     Magnesium; Standing  -     Magnesium  -     proBNP; Standing  -     proBNP  -     ReDs Vest    2. Essential hypertension    3. Atrial fibrillation, persistent  -     metoprolol succinate XL (TOPROL-XL) 100 MG 24 hr tablet; Take 1 tablet by mouth Every Night.  Dispense: 60 tablet; Refill: 3             MEDS ORDERED TODAY:    New Medications Ordered This Visit   Medications    metoprolol succinate XL (TOPROL-XL) 100 MG 24 hr tablet     Sig: Take 1 tablet by mouth Every Night.     Dispense:  60 tablet     Refill:  3        ---------------------------------------------------------------------------------------------------------------------------          ASSESSMENT/PLAN:      Diagnosis Plan   1. Chronic HFrEF (heart failure with reduced ejection fraction)  Basic Metabolic Panel    Magnesium    proBNP    Basic Metabolic Panel    Basic Metabolic Panel    Magnesium    Magnesium    proBNP    proBNP    ReDs Vest      2. Essential hypertension        3. Atrial fibrillation, persistent  metoprolol succinate XL (TOPROL-XL) 100 MG 24 hr tablet            NYHA stage Stage C: Structural heart disease is  present AND symptoms have occurredFC-Class III: Marked limitation of physical activity. Comfortable at rest. Less than ordinary activity causes fatigue, palpitation, or dyspnea.     Today, Patient appears euvolemic.and with  Moderate perfusion. The patient's hemodynamics are currently acceptable. HR is: normal and is at goal. BP/MAP was reviewed and there isroom for medication up-titration.  Clinical trajectory was assessed and haswaxed and waned.     CHF GOAL DIRECTED MEDICAL THERAPY FOR PATIENT ADDRESSED/ADJUSTED:     GDMT: HFrEF    Drug Class   Drug   Dose Last Dose Adjustment Notes   ACEi/ARB/ARNI Entresto 24-26mg BID     Beta Blocker Toprol XL  100mg Change to nightly    MRA Spironolactone 12.5mg qd     SGLT2i Jardiance  10mg  N/A   Secondaries if applicable:  Verquevo 2.5       -CHF Specific BB:    Metoprolol Succinate  at dose of 100mg.  Change to nightly dosing due to daytime fatigue.   We discussed processes/benefits of HF clinic including nursing, pharmacist, and provider evaluation during each visit with ability for in office ReDS vest, labs, and ability to provide IV diuresis in the clinic with close outpatient monitoring.  Additionally, patient was educated about the availability of delivery of medications to patient's clinic room prior to leaving the building which assists with medication compliance and insures medications are in hands when changes are made (if patient opts for apothecary usage) with thorough guidance regarding changes and medication schedule provided.          -ACE/ARB/ARNi:   Continue current Entresto dose.     -MRA:   Continue Aldactone dose.     -SGLT2 inhibitor therapy:   Continue Jardiance 10mg.      -Diuretic regimen:   ReDS Vest reading for. 04/07/25 is 29  ReDs Vest reading reviewed with patient.    Continue daily MRA with PRN Lasix (has not required dose recently)   BMP, Mag, & ProBNP  obtained and reviewed with patient.  ProBNP remains WNL.  Creatinine remains WNL.       -Fluid restriction/Sodium restriction:   Requested 2000 ml restriction  Patient has been asked to weigh daily and was provided with a printed diuretic strategy.  1,500 mg Na restriction was discussed.    Secondary pharmacological therapy in HFreF:   EF is less than 45% @ 30%.  Repeat EF remains under 35%.  Patient has EP appointment in March 2025.    Maximized on GDMT as tolerated thus far including Spironolactone, Jardiance, beta blocker, Entresto.   Recent hospitalization or IV diuresis includes 09/2024  Given HFrEF with optimally treated with primary therapies for HFrEF and vasodilator therapy, continue Verquevo.  Could not tolerate upward dose titration 2/2 nausea.  Continue 2.5mg qd.       -Acute and/or Chronic underlying conditions other than HF addressed during visit:   Paroxysmal atrial fibrillation  Chronic anticoagulation:   Hx MCA CVA:   Continue Eliquis 5mg BID.   Continue f/u with [facility].   Keep EP f/u.      CAD:   CT Coronaries with CAD RADS 4 and LAD showing greater than 75% stenosis of the vessel secondary to calcific plaque, RCA showing mid level motion artificat, and circumflex demonstrating mild midlevel calcification but not well evaluated in some regions due to motion.     Fatigue:   Switch beta blocker dosing to nightly to see if this assists with fatigue.     Identifiable barriers to Heart Failure Self-care:   Medical Barriers:  No immediate known barriers  Social Barriers: Financial [place] of HF treatment      --------------------------------------------------------------------------------        Barriers to GDMT/dose titration:  Hemodynamic limitations (HR, BP) and dizziness  Goal for next visit: Improved fatigue  RTC: 3 months  Criteria that would warrant earlier follow-up: Worsening swelling, shortness of breath, weight gain   Lab monitoring requirements: Repeat labs on next visit  Patient education provided: AVS   Self-monitoring instructions:  Daily weights, daily blood pressure  monitoring.               This document has been electronically signed by Mayte Merino PA-C  April 7, 2025 10:39 EDT      Dictated Utilizing Dragon Dictation: Part of this note may be an electronic transcription/translation of spoken language to printed text using the Dragon Dictation System.    Follow-up appointment and medication changes provided in hand delivered After Visit Summary as well as reviewed in the room.

## 2025-04-07 NOTE — PROGRESS NOTES
Heart Failure Clinic    Date: 04/07/25     Vitals:    04/07/25 0908   BP: 109/62   Pulse: 73   SpO2: 97%      Weight 231.2  Method of arrival: Ambulatory    Weighing self daily: Yes    Monitoring Heart Failure Zones: Yes    Today's HF Zone: Yellow     Taking medications as prescribed: Yes    Edema No    Shortness of Air: No    Number of pillows used at night:<2    Educational Materials given:  avs                                                                         ReDS Value: 29  25-35 Optimal Value Status      Shikha Velasquez RN 04/07/25 09:09 EDT

## 2025-04-07 NOTE — PROGRESS NOTES
[facility]  JOCELYN Dacosta Brandy Jean, APRN  [address] [place] [id number]    Thank you for asking me to see Anshul Andrews for congestive heart failure.    HPI:     This is a 70 y.o. male with known past medical history of:    HFrEF  SHARYN from 09/17/24 with EF around 30%; severe left atrial enlargement. Severe global hypokinetic left atrial appendage  TTE from 12/24/24 with EF 31-35%  SHARYN from 03/17/25 with EF 36-40% and with no known evidence of left atrial appendage thrombus present.   Paroxysmal atrial fibrillation  Right MCA stroke s/p thrombectomy @ [facility] in September 2024 felt to likely be cardioembolic at time of event per chart review  DM   Essential HTN  Polycythemia  Hx of cardiac thrombi   Tobacco abuse    Anshul Andrews presents for today for Heart Failure clinic evaluation.  The patient is typically seen by Rosaura Guzman APRN.  Patient's primary cardiologist is Dr. Martines.     Last known hospitalization and/or ED visit: Patient was initially hospitalized at the [facility] with right MCA stroke during which time he was found to have new onset atrial fibrillation as well as HFrEF with ejection fraction around 30%  ED visit in December 2024 on the 13th with afib with note reviewed and dc home from ED.   Accompanied by: Daughter        04/07/2025  visit data/details regarding:   Dyspnea: Denies dyspnea on exertion  Lower extremity swelling: Denies swelling of lower extremities  Abdominal swelling: Has abdominal distention  Home weight: Weight monitoring booklet provided during initial visit; provided an initial visit  Home BP: BP monitoring booklet provided during initial visit; pressure cuff provided in initial visit  Home heart rate: HR monitoring booklet provided during initial visit;  Daily activities of living: Performing independently  Pillows/lying flat: Now down to 1 pillow  HF zone: Yellow zone (due to  fatigue)  Mr. Andrews denies chest pain.  He is without significant shortness of breath or swelling in his extremities but reports he is plagued by fatigue.    No longer enjoys going to the store due to fatigue.     Blood pressures running okay at home mostly in the 100s-110s.  Weights are staying stable at home as well.        Review of Systems - Review of Systems   Constitutional: Positive for malaise/fatigue. Negative for decreased appetite, diaphoresis and fever.   HENT:  Negative for congestion and ear pain.    Eyes:  Negative for blurred vision.   Cardiovascular:  Negative for chest pain and dyspnea on exertion.   Respiratory:  Negative for cough and shortness of breath.    Endocrine: Negative for cold intolerance and polydipsia.   Hematologic/Lymphatic: Negative for adenopathy and bleeding problem.   Skin:  Negative for dry skin and nail changes.   Musculoskeletal:  Negative for arthritis and falls.   Gastrointestinal:  Negative for bloating, anorexia and nausea.   Genitourinary:  Negative for bladder incontinence and dysuria.   Neurological:  Negative for brief paralysis and difficulty with concentration.   Psychiatric/Behavioral:  Negative for altered mental status and hallucinations.          All other systems were reviewed and were negative.    Patient Active Problem List   Diagnosis    CVA (cerebral vascular accident)    Cardiac arrhythmia    Simple chronic bronchitis    Prediabetes    Atrial fibrillation    Sleep apnea    Chronic HFrEF (heart failure with reduced ejection fraction)    NSVT (nonsustained ventricular tachycardia)    SHERIFF (dyspnea on exertion)    Abnormal cardiac CT angiography    Arthritis    Essential hypertension       family history includes Heart attack in his father and paternal uncle.     reports that he has quit smoking. His smoking use included cigarettes. He started smoking about 25 years ago. He has a 25.3 pack-year smoking history. He has never used smokeless tobacco. He reports  that he does not drink alcohol and does not use drugs.    No Known Allergies      Current Outpatient Medications:     albuterol sulfate  (90 Base) MCG/ACT inhaler, Inhale 2 puffs Every 4 (Four) Hours As Needed for Wheezing., Disp: 8 g, Rfl: 2    atorvastatin (LIPITOR) 40 MG tablet, Take 1 tablet by mouth Daily., Disp: 90 tablet, Rfl: 3    clopidogrel (Plavix) 75 MG tablet, Take 1 tablet by mouth Daily., Disp: 30 tablet, Rfl: 6    Eliquis 5 MG tablet tablet, TAKE 1 TABLET BY MOUTH EVERY 12 HOURS FOR ATRIAL FIBRILLATION., Disp: 60 tablet, Rfl: 0    empagliflozin (JARDIANCE) 10 MG tablet tablet, Take 1 tablet by mouth Daily for 30 days., Disp: 30 tablet, Rfl: 3    Fluticasone-Salmeterol (Wixela Inhub) 250-50 MCG/ACT DISKUS, Inhale 1 puff 2 (Two) Times a Day., Disp: 60 each, Rfl: 5    furosemide (Lasix) 20 MG tablet, Take 1 tablet by mouth Daily As Needed (Leg swelling)., Disp: 30 tablet, Rfl: 0    metoprolol succinate XL (TOPROL-XL) 100 MG 24 hr tablet, Take 1 tablet by mouth Every Night., Disp: 60 tablet, Rfl: 3    ranolazine (RANEXA) 500 MG 12 hr tablet, Take 1 tablet by mouth 2 (Two) Times a Day., Disp: 60 tablet, Rfl: 3    sacubitril-valsartan (Entresto) 24-26 MG tablet, Take 1 tablet by mouth 2 (Two) Times a Day for 180 days., Disp: 180 tablet, Rfl: 1    spironolactone (ALDACTONE) 25 MG tablet, Take 0.5 tablets by mouth Daily., Disp: 15 tablet, Rfl: 3    Vericiguat 2.5 MG tablet, Take 1 tablet by mouth Daily for 180 days., Disp: 90 tablet, Rfl: 1      Physical Exam:  I have reviewed the patient's current vital signs as documented in the patient's EMR.   Vitals:    04/07/25 0908   BP: 109/62   Pulse: 73   SpO2: 97%           Body mass index is 26.72 kg/m².       04/07/25 0908   Weight: 105 kg (231 lb 3.2 oz)            Physical Exam  Vitals and nursing note reviewed.   Constitutional:       General: He is awake.      Appearance: Normal appearance. He is well-developed and well-groomed.   HENT:      Head:  Normocephalic and atraumatic.   Eyes:      General: Lids are normal.      Conjunctiva/sclera:      Right eye: Right conjunctiva is not injected.      Left eye: Left conjunctiva is not injected.   Cardiovascular:      Rate and Rhythm: Normal rate. Rhythm irregularly irregular.      Heart sounds:      No systolic murmur is present.      No diastolic murmur is present.   Pulmonary:      Effort: No tachypnea or bradypnea.      Breath sounds: No decreased breath sounds, wheezing or rhonchi.   Abdominal:      General: Bowel sounds are normal.      Palpations: Abdomen is soft.      Tenderness: There is no abdominal tenderness.   Musculoskeletal:      Right lower leg: No edema.      Left lower leg: No edema.   Neurological:      Mental Status: He is alert and oriented to person, place, and time.   Psychiatric:         Attention and Perception: Attention normal.         Mood and Affect: Mood normal.         Behavior: Behavior is cooperative.          JVP: Volume/Pulsation: Normal.        DATA REVIEWED:           ---------------------------------------------------  TTE/SHARYN:  Results for orders placed during the hospital encounter of 03/17/25    Adult Transesophageal Echo (SHARYN) W/ Cont if Necessary Per Protocol    Interpretation Summary    Left ventricular ejection fraction appears to be 36 - 40%.    Doppler interrogation shows normal flow within the left atrial appendage. No evidence of a left atrial appendage thrombus was present.    The left atrial cavity is dilated.    Saline test results are negative for right to left atrial level shunt.    The right atrial cavity is dilated.    Mild dilation of the aortic root is present measuring 4.3 cm.    Trace mitral valve regurgitation is present    There is no evidence of pericardial effusion              LAST HEART CATH RESULT/IF AVAILABLE:     No results found for this or any previous visit.      -----------------------------------------------------  CXR/Imaging:   Imaging  "Results (Most Recent)       None            I personally reviewed and interpreted the CXR.      -----------------------------------------------------  CT:   CT Angiogram Coronary  Result Date: 3/23/2025  1.  LAD calcium score of 192. The LAD proximally shows probably greater than 75% stenosis of the vessel secondary to calcific plaque. 2.  The RCA shows some mid level motion artifact. 3.  The circumflex demonstrates mild midlevel calcification but not well evaluated in some regions due to motion.  ASSESSMENT:   CAD RADS 4 consider ICA or functional assessment. P-3  This report was finalized on 3/23/2025 2:22 PM by Dr. Iain Ng MD.      I personally reviewed the images of the CT scan.  My personal interpretation is below.      ----------------------------------------------------      --------------------------------------------------------------------------------------------------    Laboratory evaluations:    Lab Results   Component Value Date    GLUCOSE 87 04/07/2025    BUN 22 04/07/2025    CREATININE 1.15 04/07/2025    BCR 19.1 04/07/2025    K 4.4 04/07/2025    CO2 24.1 04/07/2025    CALCIUM 9.1 04/07/2025    ALBUMIN 3.5 02/07/2025    AST 15 02/07/2025    ALT 21 02/07/2025     Lab Results   Component Value Date    WBC 11.24 (H) 02/07/2025    HGB 17.1 02/07/2025    HCT 51.2 (H) 02/07/2025    MCV 85.0 02/07/2025     02/07/2025     Lab Results   Component Value Date    CHOL 145 02/07/2025    TRIG 204 (H) 02/07/2025    HDL 37 (L) 02/07/2025    LDL 74 02/07/2025     Lab Results   Component Value Date    TSH 1.770 12/13/2024     Lab Results   Component Value Date    HGBA1C 5.90 (H) 02/07/2025     Lab Results   Component Value Date    ALT 21 02/07/2025     Lab Results   Component Value Date    HGBA1C 5.90 (H) 02/07/2025    HGBA1C 6.20 (H) 09/25/2024     Lab Results   Component Value Date    CREATININE 1.15 04/07/2025     No results found for: \"IRON\", \"TIBC\", \"FERRITIN\"  Lab Results   Component Value Date    " INR 1.28 (H) 12/13/2024    INR 1.1 09/13/2024    PROTIME 16.1 (H) 12/13/2024    PROTIME 11.4 09/13/2024        Lab Results   Component Value Date    ABSOLUTELUNG 29 04/07/2025    ABSOLUTELUNG 34 01/06/2025    ABSOLUTELUNG 28 11/19/2024           1. Chronic HFrEF (heart failure with reduced ejection fraction)    2. Essential hypertension    3. Atrial fibrillation, persistent          ORDERS PLACED TODAY:  Orders Placed This Encounter   Procedures    ReDs Vest    Basic Metabolic Panel    Magnesium    proBNP    Basic Metabolic Panel    Magnesium    proBNP        Diagnoses and all orders for this visit:    1. Chronic HFrEF (heart failure with reduced ejection fraction) (Primary)  -     Basic Metabolic Panel; Future  -     Magnesium; Future  -     proBNP; Future  -     Basic Metabolic Panel; Standing  -     Basic Metabolic Panel  -     Magnesium; Standing  -     Magnesium  -     proBNP; Standing  -     proBNP  -     ReDs Vest    2. Essential hypertension    3. Atrial fibrillation, persistent  -     metoprolol succinate XL (TOPROL-XL) 100 MG 24 hr tablet; Take 1 tablet by mouth Every Night.  Dispense: 60 tablet; Refill: 3             MEDS ORDERED TODAY:    New Medications Ordered This Visit   Medications    metoprolol succinate XL (TOPROL-XL) 100 MG 24 hr tablet     Sig: Take 1 tablet by mouth Every Night.     Dispense:  60 tablet     Refill:  3        ---------------------------------------------------------------------------------------------------------------------------          ASSESSMENT/PLAN:      Diagnosis Plan   1. Chronic HFrEF (heart failure with reduced ejection fraction)  Basic Metabolic Panel    Magnesium    proBNP    Basic Metabolic Panel    Basic Metabolic Panel    Magnesium    Magnesium    proBNP    proBNP    ReDs Vest      2. Essential hypertension        3. Atrial fibrillation, persistent  metoprolol succinate XL (TOPROL-XL) 100 MG 24 hr tablet            NYHA stage Stage C: Structural heart disease is  present AND symptoms have occurredFC-Class III: Marked limitation of physical activity. Comfortable at rest. Less than ordinary activity causes fatigue, palpitation, or dyspnea.     Today, Patient appears euvolemic.and with  Moderate perfusion. The patient's hemodynamics are currently acceptable. HR is: normal and is at goal. BP/MAP was reviewed and there isroom for medication up-titration.  Clinical trajectory was assessed and haswaxed and waned.     CHF GOAL DIRECTED MEDICAL THERAPY FOR PATIENT ADDRESSED/ADJUSTED:     GDMT: HFrEF    Drug Class   Drug   Dose Last Dose Adjustment Notes   ACEi/ARB/ARNI Entresto 24-26mg BID     Beta Blocker Toprol XL  100mg Change to nightly    MRA Spironolactone 12.5mg qd     SGLT2i Jardiance  10mg  N/A   Secondaries if applicable:  Verquevo 2.5       -CHF Specific BB:    Metoprolol Succinate  at dose of 100mg.  Change to nightly dosing due to daytime fatigue.   We discussed processes/benefits of HF clinic including nursing, pharmacist, and provider evaluation during each visit with ability for in office ReDS vest, labs, and ability to provide IV diuresis in the clinic with close outpatient monitoring.  Additionally, patient was educated about the availability of delivery of medications to patient's clinic room prior to leaving the building which assists with medication compliance and insures medications are in hands when changes are made (if patient opts for apothecary usage) with thorough guidance regarding changes and medication schedule provided.          -ACE/ARB/ARNi:   Continue current Entresto dose.     -MRA:   Continue Aldactone dose.     -SGLT2 inhibitor therapy:   Continue Jardiance 10mg.      -Diuretic regimen:   ReDS Vest reading for. 04/07/25 is 29  ReDs Vest reading reviewed with patient.    Continue daily MRA with PRN Lasix (has not required dose recently)   BMP, Mag, & ProBNP  obtained and reviewed with patient.  ProBNP remains WNL.  Creatinine remains WNL.       -Fluid restriction/Sodium restriction:   Requested 2000 ml restriction  Patient has been asked to weigh daily and was provided with a printed diuretic strategy.  1,500 mg Na restriction was discussed.    Secondary pharmacological therapy in HFreF:   EF is less than 45% @ 30%.  Repeat EF remains under 35%.  Patient has EP appointment in March 2025.    Maximized on GDMT as tolerated thus far including Spironolactone, Jardiance, beta blocker, Entresto.   Recent hospitalization or IV diuresis includes 09/2024  Given HFrEF with optimally treated with primary therapies for HFrEF and vasodilator therapy, continue Verquevo.  Could not tolerate upward dose titration 2/2 nausea.  Continue 2.5mg qd.       -Acute and/or Chronic underlying conditions other than HF addressed during visit:   Paroxysmal atrial fibrillation  Chronic anticoagulation:   Hx MCA CVA:   Continue Eliquis 5mg BID.   Continue f/u with [facility].   Keep EP f/u.      CAD:   CT Coronaries with CAD RADS 4 and LAD showing greater than 75% stenosis of the vessel secondary to calcific plaque, RCA showing mid level motion artificat, and circumflex demonstrating mild midlevel calcification but not well evaluated in some regions due to motion.     Fatigue:   Switch beta blocker dosing to nightly to see if this assists with fatigue.     Identifiable barriers to Heart Failure Self-care:   Medical Barriers:  No immediate known barriers  Social Barriers: Financial [place] of HF treatment      --------------------------------------------------------------------------------        Barriers to GDMT/dose titration:  Hemodynamic limitations (HR, BP) and dizziness  Goal for next visit: Improved fatigue  RTC: 3 months  Criteria that would warrant earlier follow-up: Worsening swelling, shortness of breath, weight gain   Lab monitoring requirements: Repeat labs on next visit  Patient education provided: AVS   Self-monitoring instructions:  Daily weights, daily blood pressure  monitoring.               This document has been electronically signed by Mayte Merino PA-C  April 7, 2025 10:39 EDT      Dictated Utilizing Dragon Dictation: Part of this note may be an electronic transcription/translation of spoken language to printed text using the Dragon Dictation System.    Follow-up appointment and medication changes provided in hand delivered After Visit Summary as well as reviewed in the room.

## 2025-04-07 NOTE — PROGRESS NOTES
Heart Failure Clinic  Pharmacist Note     Anshul Andrews is a 70 y.o. male seen in the Heart Failure Clinic for HFrEF with last EF 31-35% on 12/23/24.      Anshul Andrews reports a fair understanding of medications and is accompanied by his daughter who assists him with his medications. Patient reports that he weighs himself first thing in the morning and his weights have been steady, as well as his BP's every morning. He denies any swelling at this time and has not had to take any Prn Lasix, but reports SOB with activity and reports that he is tired all of the time. He reports that he used to look forward to going to the store, but now he dreads it and is SOB by the end of it.     His daughter reports that Eliquis is cheaper now and they are able to afford it. They still wants to get the 3 covered on the vega from us.     Medication Use:   Hx of med intolerances:  None related to HF  Retail Rx Management: [facility] [place], Medicare coverage started 01/01/2025 and vega was obtained- ship Verquvo, Entresto and Jardiance      Past Medical History:   Diagnosis Date    Atrial fibrillation     Diabetes mellitus      ALLERGIES: Patient has no known allergies.  Current Outpatient Medications   Medication Sig Dispense Refill    albuterol sulfate  (90 Base) MCG/ACT inhaler Inhale 2 puffs Every 4 (Four) Hours As Needed for Wheezing. 8 g 2    atorvastatin (LIPITOR) 40 MG tablet Take 1 tablet by mouth Daily. 90 tablet 3    clopidogrel (Plavix) 75 MG tablet Take 1 tablet by mouth Daily. 30 tablet 6    Eliquis 5 MG tablet tablet TAKE 1 TABLET BY MOUTH EVERY 12 HOURS FOR ATRIAL FIBRILLATION. 60 tablet 0    empagliflozin (JARDIANCE) 10 MG tablet tablet Take 1 tablet by mouth Daily for 30 days. 30 tablet 3    Fluticasone-Salmeterol (Wixela Inhub) 250-50 MCG/ACT DISKUS Inhale 1 puff 2 (Two) Times a Day. 60 each 5    furosemide (Lasix) 20 MG tablet Take 1 tablet by mouth Daily As Needed (Leg swelling). 30 tablet 0    metoprolol  "succinate XL (TOPROL-XL) 100 MG 24 hr tablet Take 1 tablet by mouth Every Night. 60 tablet 3    ranolazine (RANEXA) 500 MG 12 hr tablet Take 1 tablet by mouth 2 (Two) Times a Day. 60 tablet 3    sacubitril-valsartan (Entresto) 24-26 MG tablet Take 1 tablet by mouth 2 (Two) Times a Day for 180 days. 180 tablet 1    spironolactone (ALDACTONE) 25 MG tablet Take 0.5 tablets by mouth Daily. 15 tablet 3    Vericiguat 2.5 MG tablet Take 1 tablet by mouth Daily for 180 days. 90 tablet 1     No current facility-administered medications for this encounter.       Vaccination History:   Pneumonia: Pt reports UTD and that he goes through PCP   Annual Influenza: [id number] received on 10/3/24  Shingles: Declines 11/19/24    Objective  Vitals:    04/07/25 0908   BP: 109/62   BP Location: Left arm   Patient Position: Sitting   Cuff Size: Adult   Pulse: 73   SpO2: 97%   Weight: 105 kg (231 lb 3.2 oz)   Height: 198.1 cm (78\")         Wt Readings from Last 3 Encounters:   04/07/25 105 kg (231 lb 3.2 oz)   03/28/25 104 kg (228 lb 6.4 oz)   03/17/25 104 kg (230 lb)         04/07/25  0908   Weight: 105 kg (231 lb 3.2 oz)         Lab Results   Component Value Date    GLUCOSE 87 04/07/2025    BUN 22 04/07/2025    CREATININE 1.15 04/07/2025    BCR 19.1 04/07/2025    K 4.4 04/07/2025    CO2 24.1 04/07/2025    CALCIUM 9.1 04/07/2025    ALBUMIN 3.5 02/07/2025    AST 15 02/07/2025    ALT 21 02/07/2025     Lab Results   Component Value Date    WBC 11.24 (H) 02/07/2025    HGB 17.1 02/07/2025    HCT 51.2 (H) 02/07/2025    MCV 85.0 02/07/2025     02/07/2025     Lab Results   Component Value Date    TROPONINT 16 12/13/2024     Lab Results   Component Value Date    PROBNP 754.2 01/06/2025     Results for orders placed during the hospital encounter of 03/17/25    Adult Transesophageal Echo (SHARYN) W/ Cont if Necessary Per Protocol    Interpretation Summary    Left ventricular ejection fraction appears to be 36 - 40%.    Doppler interrogation shows " normal flow within the left atrial appendage. No evidence of a left atrial appendage thrombus was present.    The left atrial cavity is dilated.    Saline test results are negative for right to left atrial level shunt.    The right atrial cavity is dilated.    Mild dilation of the aortic root is present measuring 4.3 cm.    Trace mitral valve regurgitation is present    There is no evidence of pericardial effusion         GDMT    Drug Class   Drug   Dose Last Dose Adjustment Additional Titration   Notes   ACEi/ARB/ARNI Entresto  24/26 mg 10/17/24     Beta Blocker Toprol  mg  HS now 4/7/25     MRA Spironolactone 12.5 mg 10/17/24     SGLT2i Jardiance  10 mg  10/2/24      Verquvo  2.5mg 11/19/24  Nausea with 5mg    Lasix 20mg PRN      Drug Therapy Problems    GDMT- tired and increased SOB  Requested refill on his Wixela    Recommendations:     Mayte to switch his Toprol dose to nightly to see if that will help with the tiredness and fatigue.   Called [facility] to cancel the jardiance Rx they have there, since we will be filling here and also requested a refill on his Wixela- she reports that it is no longer covered, but that she would make the PCP aware.       Patient was educated on heart failure medications and the importance of medication adherence. All questions were addressed and patient expressed  understanding.     Thank you for allowing me to participate in the care of your patient,    Emily Boucher, PharmD  04/07/25  10:13 EDT

## 2025-04-07 NOTE — PATIENT INSTRUCTIONS
Heart Failure Action Plan  A heart failure action plan helps you know what to do when you have symptoms of heart failure. Your action plan is a color-coded plan that lists the symptoms to watch for and indicates what actions to take.  If you have symptoms in the green zone, you're doing well.  If you have symptoms in the yellow zone, you're having problems.  If you have symptoms in the red zone, you need medical care right away.  Follow the plan that was created by you and your health care provider. Review your plan each time you visit your provider.  Green zone  These signs mean you're doing well and can continue what you're doing:  You don't have new or worsening shortness of breath.  You have very little swelling or no new swelling.  Your weight is stable (no gain or loss).  You have a normal activity level.  You don't have chest pain or any other new symptoms.  Yellow zone  These signs and symptoms mean your condition may be getting worse and you should make some changes:  You have trouble breathing when you're active.  You have swelling in your feet or legs or have discomfort in your belly.  You gain 2-3 lb (0.9-1.4 kg) in 24 hours, or 5 lb (2.3 kg) in a week. This amount may be more or less depending on your condition.  You get tired easily.  You have trouble sleeping.  You have a dry cough.  If you have any of these symptoms:  Contact your provider within the next day.  Your provider may adjust your medicines.  Red zone  These signs and symptoms mean you should get medical help right away:  You have trouble breathing when resting or cannot lie flat and you need to raise your head to help you breathe.  You have a dry cough that's getting worse.  You have swelling or pain in your feet or legs or discomfort in your belly that's getting worse.  You suddenly gain more than 2-3 lb (0.9-1.4 kg) in 24 hours, or more than 5 lb (2.3 kg) in a week. This amount may be more or less depending on your condition.  You have  trouble staying awake or you feel confused.  You don't have an appetite.  You have worsening sadness or depression.  These symptoms may be an emergency. Call 911 right away.  Do not wait to see if the symptoms will go away.  Do not drive yourself to the hospital.  Follow these instructions at home:  Take medicines only as told.  Eat a heart-healthy diet. Work with a dietitian to create an eating plan that's best for you.  Weigh yourself each day.   Call your provider if you gain more than 3 lb in 24 hours, or more than 5 lb in a week.  Health care provider name: [facility] provider phone number: [phone]

## 2025-04-18 ENCOUNTER — HOSPITAL ENCOUNTER (OUTPATIENT)
Facility: HOSPITAL | Age: 70
Setting detail: HOSPITAL OUTPATIENT SURGERY
Discharge: HOME | End: 2025-04-18
Attending: INTERNAL MEDICINE | Admitting: INTERNAL MEDICINE
Payer: MEDICARE

## 2025-04-18 VITALS
HEART RATE: 78 BPM | TEMPERATURE: 97.3 F | BODY MASS INDEX: 26.03 KG/M2 | WEIGHT: 225 LBS | HEIGHT: 78 IN | DIASTOLIC BLOOD PRESSURE: 94 MMHG | OXYGEN SATURATION: 95 % | SYSTOLIC BLOOD PRESSURE: 136 MMHG | RESPIRATION RATE: 16 BRPM

## 2025-04-18 DIAGNOSIS — R93.1 ABNORMAL CARDIAC CT ANGIOGRAPHY: ICD-10-CM

## 2025-04-18 DIAGNOSIS — R06.09 DOE (DYSPNEA ON EXERTION): ICD-10-CM

## 2025-04-18 DIAGNOSIS — I47.29 NSVT (NONSUSTAINED VENTRICULAR TACHYCARDIA): ICD-10-CM

## 2025-04-18 DIAGNOSIS — I50.22 CHRONIC HFREF (HEART FAILURE WITH REDUCED EJECTION FRACTION): ICD-10-CM

## 2025-04-18 LAB
ANION GAP SERPL CALCULATED.3IONS-SCNC: 11 MMOL/L (ref 5–15)
BASOPHILS # BLD AUTO: 0.08 10*3/MM3 (ref 0–0.2)
BASOPHILS NFR BLD AUTO: 0.7 % (ref 0–1.5)
BUN SERPL-MCNC: 13 MG/DL (ref 8–23)
BUN/CREAT SERPL: 11.2 (ref 7–25)
CALCIUM SPEC-SCNC: 9.7 MG/DL (ref 8.6–10.5)
CHLORIDE SERPL-SCNC: 101 MMOL/L (ref 98–107)
CO2 SERPL-SCNC: 26 MMOL/L (ref 22–29)
CREAT SERPL-MCNC: 1.16 MG/DL (ref 0.76–1.27)
DEPRECATED RDW RBC AUTO: 42.6 FL (ref 37–54)
EGFRCR SERPLBLD CKD-EPI 2021: 68.2 ML/MIN/1.73
EOSINOPHIL # BLD AUTO: 0.11 10*3/MM3 (ref 0–0.4)
EOSINOPHIL NFR BLD AUTO: 0.9 % (ref 0.3–6.2)
ERYTHROCYTE [DISTWIDTH] IN BLOOD BY AUTOMATED COUNT: 13.5 % (ref 12.3–15.4)
GLUCOSE SERPL-MCNC: 104 MG/DL (ref 65–99)
HCT VFR BLD AUTO: 56.6 % (ref 37.5–51)
HGB BLD-MCNC: 18.1 G/DL (ref 13–17.7)
IMM GRANULOCYTES # BLD AUTO: 0.04 10*3/MM3 (ref 0–0.05)
IMM GRANULOCYTES NFR BLD AUTO: 0.3 % (ref 0–0.5)
LYMPHOCYTES # BLD AUTO: 2.65 10*3/MM3 (ref 0.7–3.1)
LYMPHOCYTES NFR BLD AUTO: 22.8 % (ref 19.6–45.3)
MCH RBC QN AUTO: 27.8 PG (ref 26.6–33)
MCHC RBC AUTO-ENTMCNC: 32 G/DL (ref 31.5–35.7)
MCV RBC AUTO: 86.8 FL (ref 79–97)
MONOCYTES # BLD AUTO: 0.79 10*3/MM3 (ref 0.1–0.9)
MONOCYTES NFR BLD AUTO: 6.8 % (ref 5–12)
NEUTROPHILS NFR BLD AUTO: 68.5 % (ref 42.7–76)
NEUTROPHILS NFR BLD AUTO: 7.96 10*3/MM3 (ref 1.7–7)
NRBC BLD AUTO-RTO: 0 /100 WBC (ref 0–0.2)
PLATELET # BLD AUTO: 279 10*3/MM3 (ref 140–450)
PMV BLD AUTO: 9.8 FL (ref 6–12)
POTASSIUM SERPL-SCNC: 4.7 MMOL/L (ref 3.5–5.2)
RBC # BLD AUTO: 6.52 10*6/MM3 (ref 4.14–5.8)
SODIUM SERPL-SCNC: 138 MMOL/L (ref 136–145)
WBC NRBC COR # BLD AUTO: 11.63 10*3/MM3 (ref 3.4–10.8)

## 2025-04-18 PROCEDURE — 99152 MOD SED SAME PHYS/QHP 5/>YRS: CPT | Performed by: INTERNAL MEDICINE

## 2025-04-18 PROCEDURE — C1887 CATHETER, GUIDING: HCPCS | Performed by: INTERNAL MEDICINE

## 2025-04-18 PROCEDURE — 25810000003 SODIUM CHLORIDE 0.9 % SOLUTION: Performed by: INTERNAL MEDICINE

## 2025-04-18 PROCEDURE — C1894 INTRO/SHEATH, NON-LASER: HCPCS | Performed by: INTERNAL MEDICINE

## 2025-04-18 PROCEDURE — 93799 UNLISTED CV SVC/PROCEDURE: CPT | Performed by: INTERNAL MEDICINE

## 2025-04-18 PROCEDURE — 85025 COMPLETE CBC W/AUTO DIFF WBC: CPT | Performed by: INTERNAL MEDICINE

## 2025-04-18 PROCEDURE — 80048 BASIC METABOLIC PNL TOTAL CA: CPT | Performed by: INTERNAL MEDICINE

## 2025-04-18 PROCEDURE — 93571 IV DOP VEL&/PRESS C FLO 1ST: CPT | Performed by: INTERNAL MEDICINE

## 2025-04-18 PROCEDURE — 25010000002 HEPARIN (PORCINE) PER 1000 UNITS: Performed by: INTERNAL MEDICINE

## 2025-04-18 PROCEDURE — 25010000002 FENTANYL CITRATE (PF) 50 MCG/ML SOLUTION: Performed by: INTERNAL MEDICINE

## 2025-04-18 PROCEDURE — 25010000002 LIDOCAINE 2% SOLUTION: Performed by: INTERNAL MEDICINE

## 2025-04-18 PROCEDURE — 25510000001 IOPAMIDOL PER 1 ML: Performed by: INTERNAL MEDICINE

## 2025-04-18 PROCEDURE — 93458 L HRT ARTERY/VENTRICLE ANGIO: CPT | Performed by: INTERNAL MEDICINE

## 2025-04-18 PROCEDURE — 25010000002 MIDAZOLAM PER 1 MG: Performed by: INTERNAL MEDICINE

## 2025-04-18 PROCEDURE — C1769 GUIDE WIRE: HCPCS | Performed by: INTERNAL MEDICINE

## 2025-04-18 RX ORDER — VERAPAMIL HYDROCHLORIDE 2.5 MG/ML
INJECTION INTRAVENOUS
Status: DISCONTINUED | OUTPATIENT
Start: 2025-04-18 | End: 2025-04-18 | Stop reason: HOSPADM

## 2025-04-18 RX ORDER — ACETAMINOPHEN 325 MG/1
650 TABLET ORAL EVERY 4 HOURS PRN
Status: DISCONTINUED | OUTPATIENT
Start: 2025-04-18 | End: 2025-04-18 | Stop reason: HOSPADM

## 2025-04-18 RX ORDER — IOPAMIDOL 755 MG/ML
INJECTION, SOLUTION INTRAVASCULAR
Status: DISCONTINUED | OUTPATIENT
Start: 2025-04-18 | End: 2025-04-18 | Stop reason: HOSPADM

## 2025-04-18 RX ORDER — SODIUM CHLORIDE 9 MG/ML
3 INJECTION, SOLUTION INTRAVENOUS CONTINUOUS
Status: DISCONTINUED | OUTPATIENT
Start: 2025-04-18 | End: 2025-04-18 | Stop reason: HOSPADM

## 2025-04-18 RX ORDER — FENTANYL CITRATE 50 UG/ML
INJECTION, SOLUTION INTRAMUSCULAR; INTRAVENOUS
Status: DISCONTINUED | OUTPATIENT
Start: 2025-04-18 | End: 2025-04-18 | Stop reason: HOSPADM

## 2025-04-18 RX ORDER — LIDOCAINE HYDROCHLORIDE 20 MG/ML
INJECTION, SOLUTION INFILTRATION; PERINEURAL
Status: DISCONTINUED | OUTPATIENT
Start: 2025-04-18 | End: 2025-04-18 | Stop reason: HOSPADM

## 2025-04-18 RX ORDER — HEPARIN SODIUM 1000 [USP'U]/ML
INJECTION, SOLUTION INTRAVENOUS; SUBCUTANEOUS
Status: DISCONTINUED | OUTPATIENT
Start: 2025-04-18 | End: 2025-04-18 | Stop reason: HOSPADM

## 2025-04-18 RX ORDER — MIDAZOLAM HYDROCHLORIDE 1 MG/ML
INJECTION, SOLUTION INTRAMUSCULAR; INTRAVENOUS
Status: DISCONTINUED | OUTPATIENT
Start: 2025-04-18 | End: 2025-04-18 | Stop reason: HOSPADM

## 2025-04-18 RX ORDER — SODIUM CHLORIDE 9 MG/ML
INJECTION, SOLUTION INTRAVENOUS
Status: DISCONTINUED | OUTPATIENT
Start: 2025-04-18 | End: 2025-04-18 | Stop reason: HOSPADM

## 2025-04-18 RX ORDER — NITROGLYCERIN 0.4 MG/1
0.4 TABLET SUBLINGUAL
Status: DISCONTINUED | OUTPATIENT
Start: 2025-04-18 | End: 2025-04-18 | Stop reason: HOSPADM

## 2025-04-18 NOTE — NURSING NOTE
Cardiac Rehab referral received on patient. After reviewing patient information there is no qualifying diagnosis noted at this time .Qualifications for Cardiac Rehab include Coronary Stenting, AMI (NSTEMI Type 1, STEMI), Stable Angina, CABG, Heart Valve Repair/Replacement, Heart Transplant or Stable Chronic Heart Failure (with these specific qualifications, Left Ventricular Ejection Fraction of 35% or less, NYHA class II to IV symptoms despite optimal heart failure therapy for at least 6 weeks and has not had a recent (less than or equal to 6 weeks) or planned (less than or equal to 6 months) major cardiovascular hospitalizations or procedures. Due to patient being in the hospital, does not meet criteria at this time) Please contact us at [phone] with questions.    If the diagnosis is CHF when the patient meets the CHF criteria for Cardiac Rehab with a new order we will gladly schedule them.

## 2025-04-18 NOTE — Clinical Note
Hemostasis started on the right radial artery. R-Band was used in achieving hemostasis. Radial compression device applied to vessel. Hemostasis achieved successfully. Closure device additional comment: 13 mL of air

## 2025-05-02 ENCOUNTER — OFFICE VISIT (OUTPATIENT)
Dept: CARDIOLOGY | Facility: CLINIC | Age: 70
End: 2025-05-02
Payer: MEDICARE

## 2025-05-02 VITALS
HEART RATE: 49 BPM | HEIGHT: 78 IN | WEIGHT: 215 LBS | SYSTOLIC BLOOD PRESSURE: 93 MMHG | OXYGEN SATURATION: 93 % | BODY MASS INDEX: 24.88 KG/M2 | DIASTOLIC BLOOD PRESSURE: 69 MMHG

## 2025-05-02 DIAGNOSIS — T88.7XXA SIDE EFFECT OF DRUG: Primary | ICD-10-CM

## 2025-05-02 DIAGNOSIS — I48.0 PAROXYSMAL ATRIAL FIBRILLATION: ICD-10-CM

## 2025-05-02 NOTE — PROGRESS NOTES
Chief Complaint  Follow-up (Follow up heart cath, no stent, patient denies chest pain or soa, states in afib )    Subjective          Anshul Andrews presents to [facility] for follow up.    History of Present Illness    Mr. Andrews presents for follow-up after undergoing invasive coronary angiogram 04/18/2025 due to having had an abnormal coronary CTA and an episode of nonsustained ventricular tachycardia.  Mid LAD demonstrated 40 to 50% stenosis at the level of the diagonal branch.  OM 2 inferior branch with tubular 40% stenosis, RCA 40% stenosis, D2 also had 50% short stenosis.  He follows with Dr. Martines for general cardiology and Mayte Merino PA-C for the heart failure clinic.  History of Present Illness  The patient presents for a follow-up from having had the heart catheterization. He is accompanied by his daughter.    A general feeling of malaise is reported, characterized by weakness and nausea over the past week. Significant weight loss of approximately 40 pounds since the onset of his condition is noted, with a further loss of 16 pounds since 04/07/2025. His current weight is 225 pounds. Ranexa has been prescribed, which has led to a further decrease in appetite.     His heart rate at home typically ranges from 70s to 80s, although it was higher prior to the initiation of his current medication regimen. He recalls that his resting heart rate was in the 90s following a stroke in 09/2024, during which time he was also smoking and leading an active lifestyle. He continues to drive but finds it exhausting. A history of bronchitis and a 40-year smoking habit is noted. No fevers or chills are reported. An upcoming appointment with his primary care provider, Rosaura Guzman, is scheduled in 2 days. He has an appointment with Dr. Martines next week. An appointment with an electrophysiologist is scheduled for the end of 05/2025 for an ablation procedure.    SOCIAL HISTORY  Exercise:  "Previously very physically active, but currently experiencing fatigue and decreased energy.  Tobacco: Smoked for the last about 40 years.       Tobacco Use: High Risk (5/4/2025)    Patient History     Smoking Tobacco Use: Every Day     Smokeless Tobacco Use: Never     Passive Exposure: Not on file       Objective     Vital Signs:   BP 93/69 (BP Location: Left arm, Patient Position: Sitting, Cuff Size: Adult)   Pulse (!) 49   Ht 198.1 cm (78\")   Wt 97.5 kg (215 lb)   SpO2 93%   BMI 24.85 kg/m²       Physical Exam  Vitals and nursing note reviewed. Exam conducted with a chaperone present (Accompanied by his adult daughter).   Constitutional:       General: He is not in acute distress.  HENT:      Head: Normocephalic and atraumatic.   Eyes:      Conjunctiva/sclera: Conjunctivae normal.   Neck:      Vascular: No carotid bruit.   Cardiovascular:      Rate and Rhythm: Normal rate and regular rhythm.      Pulses: Normal pulses.   Pulmonary:      Effort: Pulmonary effort is normal.      Breath sounds: Normal breath sounds.   Musculoskeletal:      Cervical back: Neck supple.      Right lower leg: No edema.      Left lower leg: No edema.   Skin:     General: Skin is warm and dry.   Neurological:      General: No focal deficit present.   Psychiatric:         Mood and Affect: Mood normal.         Behavior: Behavior normal.        Physical Exam  Heart Rate: 49 initially, 90 on EKG  Lungs: Lungs sound congested.       Result Review :            The following data was reviewed by (Optional):[id number]} cardiac and lab studies as detailed below.  WBC   Date Value Ref Range Status   04/18/2025 11.63 (H) 3.40 - 10.80 10*3/mm3 Final     Hemoglobin   Date Value Ref Range Status   04/18/2025 18.1 (H) 13.0 - 17.7 g/dL Final     Hematocrit   Date Value Ref Range Status   04/18/2025 56.6 (H) 37.5 - 51.0 % Final     MCV   Date Value Ref Range Status   04/18/2025 86.8 79.0 - 97.0 fL Final     MCH   Date Value Ref Range Status   04/18/2025 " 27.8 26.6 - 33.0 pg Final     Platelets   Date Value Ref Range Status   04/18/2025 279 140 - 450 10*3/mm3 Final     Glucose   Date Value Ref Range Status   04/18/2025 104 (H) 65 - 99 mg/dL Final     BUN   Date Value Ref Range Status   04/18/2025 13 8 - 23 mg/dL Final     Creatinine   Date Value Ref Range Status   04/18/2025 1.16 0.76 - 1.27 mg/dL Final     Sodium   Date Value Ref Range Status   04/18/2025 138 136 - 145 mmol/L Final     Potassium   Date Value Ref Range Status   04/18/2025 4.7 3.5 - 5.2 mmol/L Final     Chloride   Date Value Ref Range Status   04/18/2025 101 98 - 107 mmol/L Final     CO2   Date Value Ref Range Status   04/18/2025 26.0 22.0 - 29.0 mmol/L Final     Calcium   Date Value Ref Range Status   04/18/2025 9.7 8.6 - 10.5 mg/dL Final     Total Protein   Date Value Ref Range Status   02/07/2025 6.6 6.0 - 8.5 g/dL Final     Albumin   Date Value Ref Range Status   02/07/2025 3.5 3.5 - 5.2 g/dL Final     ALT (SGPT)   Date Value Ref Range Status   02/07/2025 21 1 - 41 U/L Final     AST (SGOT)   Date Value Ref Range Status   02/07/2025 15 1 - 40 U/L Final     Alkaline Phosphatase   Date Value Ref Range Status   02/07/2025 129 (H) 39 - 117 U/L Final     Total Bilirubin   Date Value Ref Range Status   02/07/2025 0.5 0.0 - 1.2 mg/dL Final     Anion Gap   Date Value Ref Range Status   04/18/2025 11.0 5.0 - 15.0 mmol/L Final     eGFR   Date Value Ref Range Status   04/18/2025 68.2 >60.0 mL/min/1.73 Final     Total Cholesterol   Date Value Ref Range Status   02/07/2025 145 0 - 200 mg/dL Final     HDL Cholesterol   Date Value Ref Range Status   02/07/2025 37 (L) 40 - 60 mg/dL Final     LDL Cholesterol    Date Value Ref Range Status   02/07/2025 74 0 - 100 mg/dL Final     LDL/HDL Ratio   Date Value Ref Range Status   02/07/2025 1.82  Final     Hemoglobin A1C   Date Value Ref Range Status   02/07/2025 5.90 (H) 4.80 - 5.60 % Final   09/25/2024 6.20 (H) 4.80 - 5.60 % Final     Magnesium   Date Value Ref Range  Status   04/07/2025 2.0 1.6 - 2.4 mg/dL Final       Last Cardiac Cath  Results for orders placed during the hospital encounter of 04/18/25    Cardiac Catheterization/Vascular Study    Conclusion  Conclusion:  1.  Right dominant circulation.  2.  Nonobstructive disease of mid LAD by IFR.        Recommendation:  1.  Optimize medical therapy.  2.  Smoking cessation.  3.  A-fib ablation.  4.  Routine post-cath care.      Last Stress test       Last Echo  Results for orders placed during the hospital encounter of 03/17/25    Adult Transesophageal Echo (SHARYN) W/ Cont if Necessary Per Protocol    Interpretation Summary    Left ventricular ejection fraction appears to be 36 - 40%.    Doppler interrogation shows normal flow within the left atrial appendage. No evidence of a left atrial appendage thrombus was present.    The left atrial cavity is dilated.    Saline test results are negative for right to left atrial level shunt.    The right atrial cavity is dilated.    Mild dilation of the aortic root is present measuring 4.3 cm.    Trace mitral valve regurgitation is present    There is no evidence of pericardial effusion         ECG 12 Lead    Date/Time: 5/2/2025 8:09 PM  Performed by: Coral Mitchell APRN    Authorized by: Coral Mitchell APRN  Rhythm: atrial fibrillation  Rate: normal  BPM: 90  QRS axis: normal    Clinical impression: abnormal EKG           Current Outpatient Medications   Medication Sig Dispense Refill    albuterol sulfate  (90 Base) MCG/ACT inhaler Inhale 2 puffs Every 4 (Four) Hours As Needed for Wheezing. 8 g 2    atorvastatin (LIPITOR) 40 MG tablet Take 1 tablet by mouth Daily. 90 tablet 3    clopidogrel (Plavix) 75 MG tablet Take 1 tablet by mouth Daily. 30 tablet 6    Eliquis 5 MG tablet tablet TAKE 1 TABLET BY MOUTH EVERY 12 HOURS FOR ATRIAL FIBRILLATION. 60 tablet 0    Fluticasone-Salmeterol (Wixela Inhub) 250-50 MCG/ACT DISKUS Inhale 1 puff 2 (Two) Times a Day. 60 each 5    furosemide  (Lasix) 20 MG tablet Take 1 tablet by mouth Daily As Needed (Leg swelling). 30 tablet 0    metoprolol succinate XL (TOPROL-XL) 100 MG 24 hr tablet Take 1 tablet by mouth Every Night. 60 tablet 3    sacubitril-valsartan (Entresto) 24-26 MG tablet Take 1 tablet by mouth 2 (Two) Times a Day for 180 days. 180 tablet 1    spironolactone (ALDACTONE) 25 MG tablet Take 0.5 tablets by mouth Daily. 15 tablet 3    Vericiguat 2.5 MG tablet Take 1 tablet by mouth Daily for 180 days. 90 tablet 1     No current facility-administered medications for this visit.            Assessment and Plan    Problem List Items Addressed This Visit    None  Visit Diagnoses         Side effect of drug    -  Primary          Diagnoses and all orders for this visit:    1. Side effect of drug (Primary)      Assessment & Plan  1. Nonischemic heart failure.  Ranexa will be discontinued due to its potential to exacerbate nausea, contributing to decreased appetite and weight loss. Blood pressure readings have been consistently low, and heart rate was recorded at 49 upon arrival, increasing to 90 during the EKG. Lung sounds are congested, raising concerns about potential bronchitis. A note will be sent to Dr. Martines explaining the rationale for discontinuing Ranexa. Consultation with the primary care physician regarding weight loss and lung condition is advised. If necessary, a chest x-ray can be ordered to investigate the possibility of bronchitis. The upcoming appointment with the electrophysiologist should be kept for further recommendations on optimizing cardiac function.    2. Weight loss.  Rapid weight loss is concerning and not attributed to the cardiac condition. Consultation with the primary care physician is advised to investigate other potential causes. If necessary, a chest x-ray can be ordered to investigate the possibility of bronchitis.    3. Congested lung sounds.  Lung sounds are congested, raising concerns about potential bronchitis.  Consultation with the primary care physician is advised. If necessary, a chest x-ray can be ordered to investigate the possibility of bronchitis.    Follow-up:  The patient has an appointment with the electrophysiologist at the end of May for an ablation procedure.         Follow Up     No follow-ups on file.    Patient was given instructions and counseling regarding his condition or for health maintenance advice. Please see specific information pulled into the AVS if appropriate.       Electronically signed by CHARISSE Davis, 05/04/25, 8:08 PM EDT.    Patient or patient representative verbalized consent for the use of Ambient Listening during the visit with  CHARISSE Davis for chart documentation. 5/4/2025  20:08 EDT     Dictated Utilizing Dragon Dictation: Part of this note may be an electronic transcription/translation of spoken language to printed text using the Dragon Dictation System

## 2025-05-06 ENCOUNTER — OFFICE VISIT (OUTPATIENT)
Dept: FAMILY MEDICINE CLINIC | Facility: CLINIC | Age: 70
End: 2025-05-06
Payer: MEDICARE

## 2025-05-06 VITALS
HEART RATE: 64 BPM | OXYGEN SATURATION: 97 % | DIASTOLIC BLOOD PRESSURE: 70 MMHG | SYSTOLIC BLOOD PRESSURE: 88 MMHG | WEIGHT: 216 LBS | HEIGHT: 78 IN | TEMPERATURE: 96.7 F | BODY MASS INDEX: 24.99 KG/M2 | RESPIRATION RATE: 16 BRPM

## 2025-05-06 DIAGNOSIS — I48.19 ATRIAL FIBRILLATION, PERSISTENT: Chronic | ICD-10-CM

## 2025-05-06 DIAGNOSIS — J41.0 SIMPLE CHRONIC BRONCHITIS: Chronic | ICD-10-CM

## 2025-05-06 DIAGNOSIS — I50.42 CHRONIC COMBINED SYSTOLIC (CONGESTIVE) AND DIASTOLIC (CONGESTIVE) HEART FAILURE: Chronic | ICD-10-CM

## 2025-05-06 DIAGNOSIS — R73.03 PREDIABETES: Primary | Chronic | ICD-10-CM

## 2025-05-06 LAB
BASOPHILS # BLD AUTO: 0.08 10*3/MM3 (ref 0–0.2)
BASOPHILS NFR BLD AUTO: 0.6 % (ref 0–1.5)
DEPRECATED RDW RBC AUTO: 42.1 FL (ref 37–54)
EOSINOPHIL # BLD AUTO: 0.1 10*3/MM3 (ref 0–0.4)
EOSINOPHIL NFR BLD AUTO: 0.7 % (ref 0.3–6.2)
ERYTHROCYTE [DISTWIDTH] IN BLOOD BY AUTOMATED COUNT: 13.9 % (ref 12.3–15.4)
HBA1C MFR BLD: 6 % (ref 4.8–5.6)
HCT VFR BLD AUTO: 59.3 % (ref 37.5–51)
HGB BLD-MCNC: 18.9 G/DL (ref 13–17.7)
IMM GRANULOCYTES # BLD AUTO: 0.06 10*3/MM3 (ref 0–0.05)
IMM GRANULOCYTES NFR BLD AUTO: 0.4 % (ref 0–0.5)
LYMPHOCYTES # BLD AUTO: 2.85 10*3/MM3 (ref 0.7–3.1)
LYMPHOCYTES NFR BLD AUTO: 21.1 % (ref 19.6–45.3)
MCH RBC QN AUTO: 27.8 PG (ref 26.6–33)
MCHC RBC AUTO-ENTMCNC: 31.9 G/DL (ref 31.5–35.7)
MCV RBC AUTO: 87.1 FL (ref 79–97)
MONOCYTES # BLD AUTO: 0.86 10*3/MM3 (ref 0.1–0.9)
MONOCYTES NFR BLD AUTO: 6.4 % (ref 5–12)
NEUTROPHILS NFR BLD AUTO: 70.8 % (ref 42.7–76)
NEUTROPHILS NFR BLD AUTO: 9.58 10*3/MM3 (ref 1.7–7)
NRBC BLD AUTO-RTO: 0 /100 WBC (ref 0–0.2)
PLATELET # BLD AUTO: 266 10*3/MM3 (ref 140–450)
PMV BLD AUTO: 11.6 FL (ref 6–12)
RBC # BLD AUTO: 6.81 10*6/MM3 (ref 4.14–5.8)
WBC NRBC COR # BLD AUTO: 13.53 10*3/MM3 (ref 3.4–10.8)

## 2025-05-06 PROCEDURE — 3074F SYST BP LT 130 MM HG: CPT | Performed by: NURSE PRACTITIONER

## 2025-05-06 PROCEDURE — 3078F DIAST BP <80 MM HG: CPT | Performed by: NURSE PRACTITIONER

## 2025-05-06 PROCEDURE — 1160F RVW MEDS BY RX/DR IN RCRD: CPT | Performed by: NURSE PRACTITIONER

## 2025-05-06 PROCEDURE — 85025 COMPLETE CBC W/AUTO DIFF WBC: CPT | Performed by: NURSE PRACTITIONER

## 2025-05-06 PROCEDURE — 82043 UR ALBUMIN QUANTITATIVE: CPT | Performed by: NURSE PRACTITIONER

## 2025-05-06 PROCEDURE — 82570 ASSAY OF URINE CREATININE: CPT | Performed by: NURSE PRACTITIONER

## 2025-05-06 PROCEDURE — 1159F MED LIST DOCD IN RCRD: CPT | Performed by: NURSE PRACTITIONER

## 2025-05-06 PROCEDURE — 99214 OFFICE O/P EST MOD 30 MIN: CPT | Performed by: NURSE PRACTITIONER

## 2025-05-06 PROCEDURE — 36415 COLL VENOUS BLD VENIPUNCTURE: CPT | Performed by: NURSE PRACTITIONER

## 2025-05-06 PROCEDURE — 80053 COMPREHEN METABOLIC PANEL: CPT | Performed by: NURSE PRACTITIONER

## 2025-05-06 PROCEDURE — 83036 HEMOGLOBIN GLYCOSYLATED A1C: CPT | Performed by: NURSE PRACTITIONER

## 2025-05-06 PROCEDURE — 3044F HG A1C LEVEL LT 7.0%: CPT | Performed by: NURSE PRACTITIONER

## 2025-05-06 PROCEDURE — 1126F AMNT PAIN NOTED NONE PRSNT: CPT | Performed by: NURSE PRACTITIONER

## 2025-05-06 RX ORDER — FLUTICASONE PROPIONATE AND SALMETEROL 250; 50 UG/1; UG/1
1 POWDER RESPIRATORY (INHALATION)
Qty: 60 EACH | Refills: 5 | Status: SHIPPED | OUTPATIENT
Start: 2025-05-06 | End: 2025-05-08

## 2025-05-06 RX ORDER — FUROSEMIDE 20 MG/1
20 TABLET ORAL DAILY PRN
Qty: 30 TABLET | Refills: 0 | Status: SHIPPED | OUTPATIENT
Start: 2025-05-06

## 2025-05-06 RX ORDER — ALBUTEROL SULFATE 90 UG/1
2 INHALANT RESPIRATORY (INHALATION) EVERY 4 HOURS PRN
Qty: 8 G | Refills: 2 | Status: SHIPPED | OUTPATIENT
Start: 2025-05-06

## 2025-05-06 NOTE — PROGRESS NOTES
"Mary Andrews is a 70 y.o. male.     Chief Complaint   Patient presents with    Congestive Heart Failure    COPD    Atrial Fibrillation    Prediabetes       History of Present Illness  He presents for follow up of chf, afib, copd, and prediabetes. He has h/o cva. He is followed per the heart failure clinic for chf. He has an ablation scheduled at the end of May for afib. Cardiology notes reviewed. Cardiology was concerned about lung congestion and weight loss. He states the nausea improved after cardiology stopped the ranexa. He has lost about 16lbs without trying.  He feels he is drink so much water and is peeing out so much water that he has caused the weight loss.  He c/o still feeling very tired all the time. He states he is using the wixela twice a day. He didn't do the sleep study because of cost.        The following portions of the patient's history were reviewed and updated as appropriate: allergies, current medications, past family history, past medical history, past social history, past surgical history and problem list.    Review of Systems   Constitutional:  Positive for fatigue and unexpected weight change. Negative for fever.   Respiratory:  Positive for shortness of breath. Negative for cough and wheezing.    Cardiovascular:  Negative for chest pain and palpitations.   Gastrointestinal:  Positive for blood in stool. Negative for constipation, diarrhea, nausea and vomiting.   Genitourinary:  Negative for dysuria and hematuria.   Neurological:  Negative for dizziness and headaches.       Objective     BP (!) 88/70 (BP Location: Right arm, Patient Position: Sitting, Cuff Size: Adult)   Pulse 64   Temp 96.7 °F (35.9 °C) (Tympanic)   Resp 16   Ht 198.1 cm (77.99\")   Wt 98 kg (216 lb)   SpO2 97%   BMI 24.97 kg/m²     Physical Exam  Vitals reviewed.   Constitutional:       General: He is not in acute distress.     Appearance: He is well-developed. He is not diaphoretic.   HENT:      Head: " Normocephalic and atraumatic.   Cardiovascular:      Rate and Rhythm: Normal rate and regular rhythm.      Heart sounds: Normal heart sounds. No murmur heard.     No friction rub. No gallop.   Pulmonary:      Effort: Pulmonary effort is normal. No respiratory distress.      Breath sounds: Normal breath sounds.   Abdominal:      General: Bowel sounds are normal. There is no distension.      Palpations: Abdomen is soft.      Tenderness: There is no abdominal tenderness.   Skin:     General: Skin is warm and dry.   Neurological:      Mental Status: He is alert and oriented to person, place, and time.   Psychiatric:         Behavior: Behavior normal.         Thought Content: Thought content normal.         Judgment: Judgment normal.         Current Outpatient Medications   Medication Sig Dispense Refill    albuterol sulfate  (90 Base) MCG/ACT inhaler Inhale 2 puffs Every 4 (Four) Hours As Needed for Wheezing. 8 g 2    apixaban (Eliquis) 5 MG tablet tablet Take 1 tablet by mouth Every 12 (Twelve) Hours. 60 tablet 5    atorvastatin (LIPITOR) 40 MG tablet Take 1 tablet by mouth Daily. 90 tablet 3    clopidogrel (Plavix) 75 MG tablet Take 1 tablet by mouth Daily. 30 tablet 6    Fluticasone-Salmeterol (Wixela Inhub) 250-50 MCG/ACT DISKUS Inhale 1 puff 2 (Two) Times a Day. 60 each 5    furosemide (Lasix) 20 MG tablet Take 1 tablet by mouth Daily As Needed (Leg swelling). 30 tablet 0    metoprolol succinate XL (TOPROL-XL) 100 MG 24 hr tablet Take 1 tablet by mouth Every Night. 60 tablet 3    sacubitril-valsartan (Entresto) 24-26 MG tablet Take 1 tablet by mouth 2 (Two) Times a Day for 180 days. 180 tablet 1    spironolactone (ALDACTONE) 25 MG tablet Take 0.5 tablets by mouth Daily. 15 tablet 3    Vericiguat 2.5 MG tablet Take 1 tablet by mouth Daily for 180 days. 90 tablet 1     No current facility-administered medications for this visit.            Assessment & Plan     Problem List Items Addressed This Visit        Simple chronic bronchitis (Chronic)    Relevant Medications    albuterol sulfate  (90 Base) MCG/ACT inhaler    Fluticasone-Salmeterol (Wixela Inhub) 250-50 MCG/ACT DISKUS    Prediabetes - Primary (Chronic)    Relevant Orders    Hemoglobin A1c    Microalbumin / Creatinine Urine Ratio - Urine, Clean Catch     Other Visit Diagnoses         Atrial fibrillation, persistent        Relevant Medications    apixaban (Eliquis) 5 MG tablet tablet    Other Relevant Orders    CBC & Differential    Comprehensive Metabolic Panel      Acute HFrEF (heart failure with reduced ejection fraction)        Relevant Medications    furosemide (Lasix) 20 MG tablet              ICD-10-CM ICD-9-CM   1. Prediabetes  R73.03 790.29   2. Atrial fibrillation, persistent  I48.19 427.31   3. Simple chronic bronchitis  J41.0 491.0   4. Acute HFrEF (heart failure with reduced ejection fraction)  I50.21 428.21       Plan: Lungs are not congested today.  He declines chest x-ray.  Weight loss could be due to prediabetes.  Get labs today.  Thyroid levels have been normal recently.He declines colonoscopy.  I explained that could have a pathology hiding that could be causing weight loss.  He verbalized an understanding of the same.  Keep specialty follow-ups.  Follow-up in 3 months and as needed.    @Body mass index is 24.97 kg/m².              Understands disease processes and need for medications.  Understands reasons for urgent and emergent care.  Patient (& family) verbalized agreement for treatment plan.   Emotional support and active listening provided.  Patient provided time to verbalize feelings.           BMI is within normal parameters. No other follow-up for BMI required.      This document has been electronically signed by CHARISSE Trujillo   May 6, 2025 12:01 EDT

## 2025-05-06 NOTE — NURSING NOTE
PRE-PVA ASSESSMENT  Anshul Andrews 1955   [address] [place] [address]   [phone]        Referral Source: Josh Martines MD   Information obtained from: [x] Medical record review  [x] Patient report  Scheduled for: PVA on 05/29/2025 with Dr. Duke   No Known Allergies    AFib Specific History:  AFIBTYPE: persistent    CHADS-VASc Risk Assessment               4 Total Score    1 CHF    1 Hypertension    1 DM    1 Age 65-74    Criteria that do not apply:    Age >/= 75    PRIOR STROKE/TIA/THROMBO    Vascular Disease    Sex: Female            Anticoagulation: Eliquis 5 mg BID, NO missed doses. Patient missed doses on 5/9 and 5/10. Dr. Duke aware.   Cardioversion x 0  Failed AAD(s): 0  Prior Ablation: 0    Is Mr. Andrews aware of his AFib? Yes   Onset: 2024    Exacerbations: none   Frequency: persistent   Alleviations: none    Duration: persistent     Symptoms:   [] Palpitations:    [] Chest Discomfort:    [] Dizziness:    [] Presyncope:    [] Lightheadedness:   [] Syncope:    [x] Fatigue:    [x] Other: increased HR    [x] Short of Breath:     Last Echo(s):  [x] TTE Date:     Results for orders placed during the hospital encounter of 03/17/25    Adult Transesophageal Echo (SHARYN) W/ Cont if Necessary Per Protocol    Interpretation Summary    Left ventricular ejection fraction appears to be 36 - 40%.    Doppler interrogation shows normal flow within the left atrial appendage. No evidence of a left atrial appendage thrombus was present.    The left atrial cavity is dilated.    Saline test results are negative for right to left atrial level shunt.    The right atrial cavity is dilated.    Mild dilation of the aortic root is present measuring 4.3 cm.    Trace mitral valve regurgitation is present    There is no evidence of pericardial effusion        Past medical History:     [x] Diabetes             Tx: Jardiance                   Hemoglobin A1C   Date Value Ref Range Status   02/07/2025 5.90 (H) 4.80 -  5.60 % Final   09/25/2024 6.20 (H) 4.80 - 5.60 % Final          [] HYPOthyroidism NO  [] HYPERthyroidism NO               TSH   Date Value Ref Range Status   12/13/2024 1.770 0.270 - 4.200 uIU/mL Final       [x] HTN        [x] Tx: spironolactone     [x] Heart Failure-HFrEF    [x] CVA-[facility] admission 9-10/24 embolic stroke to right MCA s/p TNK and thrombectomy, [facility]                                [] TIA NO        [x] CAD -nonobstructive        [] MI  NO          [x] Dyslipidemia  [x] Statin indicated: atorvastatin     [x] Ischemic Evaluation       [] Stress Test: NO       [x] Heart Cath: 2025: Conclusion:  1.  Right dominant circulation.  2.  Nonobstructive disease of mid LAD by IFR    [] Sleep Apnea Suspected NO          [] Obesity NO      Other Pertinent PMH:     STOP the following medication(s) as indicated:    No medications to stop  Take last dose on:       Summary of Patient Contact:    I spoke with Mr. Andrews about his upcoming PVA.   He was well informed about the procedure from prior discussion with Dr. Duke and from reading the provided literature.  We discussed the procedure at length including risks, anesthesia, intra-op procedures, recovery, bedrest, normal post-procedure expectations, and success rates.  I answered a few remaining questions. Mr. Andrews verbalized understanding and he is ready to proceed.

## 2025-05-06 NOTE — PROGRESS NOTES
Venipuncture Blood Specimen Collection  Venipuncture performed in right arm by Samaria Molina MA with good hemostasis. Patient tolerated the procedure well without complications.   05/06/25   Samaria Molina MA

## 2025-05-07 ENCOUNTER — TELEPHONE (OUTPATIENT)
Dept: CARDIOLOGY | Facility: CLINIC | Age: 70
End: 2025-05-07

## 2025-05-07 LAB
ALBUMIN SERPL-MCNC: 4.2 G/DL (ref 3.5–5.2)
ALBUMIN UR-MCNC: <1.2 MG/DL
ALBUMIN/GLOB SERPL: 1.5 G/DL
ALP SERPL-CCNC: 140 U/L (ref 39–117)
ALT SERPL W P-5'-P-CCNC: 45 U/L (ref 1–41)
ANION GAP SERPL CALCULATED.3IONS-SCNC: 10.4 MMOL/L (ref 5–15)
AST SERPL-CCNC: 27 U/L (ref 1–40)
BILIRUB SERPL-MCNC: 0.6 MG/DL (ref 0–1.2)
BUN SERPL-MCNC: 23 MG/DL (ref 8–23)
BUN/CREAT SERPL: 20.9 (ref 7–25)
CALCIUM SPEC-SCNC: 9.9 MG/DL (ref 8.6–10.5)
CHLORIDE SERPL-SCNC: 105 MMOL/L (ref 98–107)
CO2 SERPL-SCNC: 24.6 MMOL/L (ref 22–29)
CREAT SERPL-MCNC: 1.1 MG/DL (ref 0.76–1.27)
CREAT UR-MCNC: 78.5 MG/DL
EGFRCR SERPLBLD CKD-EPI 2021: 72.7 ML/MIN/1.73
GLOBULIN UR ELPH-MCNC: 2.8 GM/DL
GLUCOSE SERPL-MCNC: 103 MG/DL (ref 65–99)
MICROALBUMIN/CREAT UR: NORMAL MG/G{CREAT}
POTASSIUM SERPL-SCNC: 5.1 MMOL/L (ref 3.5–5.2)
PROT SERPL-MCNC: 7 G/DL (ref 6–8.5)
SODIUM SERPL-SCNC: 140 MMOL/L (ref 136–145)

## 2025-05-08 ENCOUNTER — PRIOR AUTHORIZATION (OUTPATIENT)
Dept: FAMILY MEDICINE CLINIC | Facility: CLINIC | Age: 70
End: 2025-05-08
Payer: MEDICARE

## 2025-05-08 DIAGNOSIS — D58.2 ELEVATED HEMOGLOBIN: Primary | ICD-10-CM

## 2025-05-08 DIAGNOSIS — J41.0 SIMPLE CHRONIC BRONCHITIS: Primary | ICD-10-CM

## 2025-05-08 RX ORDER — FLUTICASONE FUROATE AND VILANTEROL 200; 25 UG/1; UG/1
1 POWDER RESPIRATORY (INHALATION)
Qty: 30 EACH | Refills: 11 | Status: SHIPPED | OUTPATIENT
Start: 2025-05-08

## 2025-05-08 NOTE — TELEPHONE ENCOUNTER
PRIOR AUTH FOR [employer] HAS BEEN DENIED.    We denied coverage for this drug because:  This drug is not covered on the formulary. We are denying your request because we do not show that you have tried at least 2 covered drugs that can treat your condition.   Other covered drug(s) is/are: Breo Ellipta inhalation aerosol powder breath activated (100-25 mcg/act, 200-25 mcg/act), breyna HFA inhalation aerosol inhaler (80-4.5 mcg/act, 160-4.5   mcg/act).

## 2025-05-09 NOTE — TELEPHONE ENCOUNTER
Unable to reach the patient left detailed message of undated medication sent, with any questions to give us a call.

## 2025-05-12 ENCOUNTER — OFFICE VISIT (OUTPATIENT)
Dept: CARDIOLOGY | Facility: CLINIC | Age: 70
End: 2025-05-12
Payer: MEDICARE

## 2025-05-12 VITALS
HEIGHT: 78 IN | WEIGHT: 220 LBS | BODY MASS INDEX: 25.45 KG/M2 | HEART RATE: 62 BPM | OXYGEN SATURATION: 98 % | SYSTOLIC BLOOD PRESSURE: 102 MMHG | DIASTOLIC BLOOD PRESSURE: 62 MMHG

## 2025-05-12 DIAGNOSIS — I63.412 CEREBROVASCULAR ACCIDENT (CVA) DUE TO EMBOLISM OF LEFT MIDDLE CEREBRAL ARTERY: ICD-10-CM

## 2025-05-12 DIAGNOSIS — I10 ESSENTIAL HYPERTENSION: Primary | ICD-10-CM

## 2025-05-12 DIAGNOSIS — I48.19 ATRIAL FIBRILLATION, PERSISTENT: ICD-10-CM

## 2025-05-12 DIAGNOSIS — I50.21 ACUTE HFREF (HEART FAILURE WITH REDUCED EJECTION FRACTION): ICD-10-CM

## 2025-05-12 PROBLEM — I25.10 ASCVD (ARTERIOSCLEROTIC CARDIOVASCULAR DISEASE): Status: ACTIVE | Noted: 2025-05-12

## 2025-05-12 RX ORDER — ATORVASTATIN CALCIUM 80 MG/1
80 TABLET, FILM COATED ORAL DAILY
Qty: 90 TABLET | Refills: 2 | Status: SHIPPED | OUTPATIENT
Start: 2025-05-12

## 2025-05-12 NOTE — PROGRESS NOTES
[facility]             JessicaCHARISSE Carmichael Brandy Jean, APRN  Anshul Andrews  1955 05/12/2025    Patient Active Problem List   Diagnosis    CVA (cerebral vascular accident)    Cardiac arrhythmia    Simple chronic bronchitis    Prediabetes    Atrial fibrillation    Sleep apnea    Chronic combined systolic (congestive) and diastolic (congestive) heart failure    NSVT (nonsustained ventricular tachycardia)    SHERIFF (dyspnea on exertion)    Abnormal cardiac CT angiography    Arthritis    Essential hypertension       Dear Rosaura Guzman APRN:    Subjective     Chief Complaint   Patient presents with    Results     cath       HPI:     This is a 70 y.o. 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    Anshul Andrews presents today for routine cardiology follow up.   History of Present Illness  Since patient's last visit he did undergo left heart catheterization which showed nonobstructive coronary artery disease with noted 40 to 50% stenosis in the mid LAD, 40% stenosis OM2, 40% stenosis in the mid RCA.  He was medically managed.He also underwent repeat SHARYN on March 18 which showed EF of 36 to 40% with resolution of atrial appendage thrombus.  Also followed up with heart failure clinic in April 2025 and was placed on excellent guideline directed medical therapy.  He then followed up with interventional cardiology CHARISSE Naranjo who discontinued Ranexa due to concern for decreased appetite leading to weight loss.  He was referred back to his PCP for unintentional weight loss who recommended colonoscopy the patient declines.  Lab work was also obtained showing chronically elevated blood cell count, red blood cell count and  hemoglobin.  He was referred to hematology but has not received appointment yet.  Has been following with Dr. Duke and is scheduled for A-fib ablation the end of this month.  The current plan is to have patient undergo ablation while on guideline directed medical therapy and reevaluate LV function after patient maintains normal sinus rhythm left heart catheterization did not show anything significant is critical apneas nonischemic in nature.  States has been drinking protein shakes and his weight loss has stopped.  Overall biggest complaint today is fatigue and weakness.      OF NOTE   Patient reports that he went to [facility] after having an episode of syncope where he was found to have a right MCA stroke. He underwent thrombectomy. Subsequent workup included a SHARYN which showed multiple thrombi in his left atrial appendage. There was a discussion about embolectomy for left atrial appendage clots but given the risk of the procedure it was not done and he was kept on anticoagulation. Patient was transferred to [facility] inpatient rehab where he completed his rehab in October 2024.         All other systems were reviewed and were negative.    Patient Active Problem List   Diagnosis    CVA (cerebral vascular accident)    Cardiac arrhythmia    Simple chronic bronchitis    Prediabetes    Atrial fibrillation    Sleep apnea    Chronic combined systolic (congestive) and diastolic (congestive) heart failure    NSVT (nonsustained ventricular tachycardia)    SHERIFF (dyspnea on exertion)    Abnormal cardiac CT angiography    Arthritis    Essential hypertension       family history includes Heart attack in his father and paternal uncle.     reports that he has been smoking cigarettes. He started smoking about 25 years ago. He has a 25.3 pack-year smoking history. He has never used smokeless tobacco. He reports that he does not drink alcohol and does not use drugs.    No Known  Allergies      Current Outpatient Medications:     albuterol sulfate  (90 Base) MCG/ACT inhaler, Inhale 2 puffs Every 4 (Four) Hours As Needed for Wheezing., Disp: 8 g, Rfl: 2    apixaban (Eliquis) 5 MG tablet tablet, Take 1 tablet by mouth Every 12 (Twelve) Hours., Disp: 180 tablet, Rfl: 2    atorvastatin (LIPITOR) 80 MG tablet, Take 1 tablet by mouth Daily., Disp: 90 tablet, Rfl: 2    clopidogrel (Plavix) 75 MG tablet, Take 1 tablet by mouth Daily., Disp: 30 tablet, Rfl: 6    Fluticasone Furoate-Vilanterol (Breo Ellipta) 200-25 MCG/ACT inhaler, Inhale 1 puff Daily., Disp: 30 each, Rfl: 11    furosemide (Lasix) 20 MG tablet, Take 1 tablet by mouth Daily As Needed (Leg swelling)., Disp: 30 tablet, Rfl: 0    metoprolol succinate XL (TOPROL-XL) 100 MG 24 hr tablet, Take 1 tablet by mouth Every Night., Disp: 60 tablet, Rfl: 3    sacubitril-valsartan (Entresto) 24-26 MG tablet, Take 1 tablet by mouth 2 (Two) Times a Day for 180 days., Disp: 180 tablet, Rfl: 1    spironolactone (ALDACTONE) 25 MG tablet, Take 0.5 tablets by mouth Daily., Disp: 15 tablet, Rfl: 3    Vericiguat 2.5 MG tablet, Take 1 tablet by mouth Daily for 180 days., Disp: 90 tablet, Rfl: 1      Physical Exam:  I have reviewed the patient's current vital signs as documented in the patient's EMR.   Vitals:    05/12/25 1051   BP: 102/62   Pulse: 62   SpO2: 98%     Body mass index is 25.42 kg/m².       05/12/25  1051   Weight: 99.8 kg (220 lb)      Physical Exam  Constitutional:       General: He is not in acute distress.     Appearance: Normal appearance. He is well-developed.   HENT:      Head: Normocephalic and atraumatic.      Mouth/Throat:      Mouth: Mucous membranes are moist.   Eyes:      Extraocular Movements: Extraocular movements intact.      Pupils: Pupils are equal, round, and reactive to light.   Neck:      Vascular: No JVD.   Cardiovascular:      Rate and Rhythm: Normal rate and regular rhythm.      Heart sounds: Normal heart sounds. No  murmur heard.     No S3 or S4 sounds.   Pulmonary:      Effort: Pulmonary effort is normal. No respiratory distress.      Breath sounds: Normal breath sounds. No wheezing.   Abdominal:      General: Bowel sounds are normal. There is no distension.      Palpations: Abdomen is soft. There is no hepatomegaly.      Tenderness: There is no abdominal tenderness.   Musculoskeletal:         General: Normal range of motion.      Cervical back: Normal range of motion and neck supple.   Skin:     General: Skin is warm and dry.      Coloration: Skin is not jaundiced or pale.   Neurological:      General: No focal deficit present.      Mental Status: He is alert and oriented to person, place, and time. Mental status is at baseline.   Psychiatric:         Mood and Affect: Mood normal.         Behavior: Behavior normal.         Thought Content: Thought content normal.         Judgment: Judgment normal.          DATA REVIEWED:     TTE/SHARYN:  Results for orders placed during the hospital encounter of 03/17/25    Adult Transesophageal Echo (SHARYN) W/ Cont if Necessary Per Protocol    Interpretation Summary    Left ventricular ejection fraction appears to be 36 - 40%.    Doppler interrogation shows normal flow within the left atrial appendage. No evidence of a left atrial appendage thrombus was present.    The left atrial cavity is dilated.    Saline test results are negative for right to left atrial level shunt.    The right atrial cavity is dilated.    Mild dilation of the aortic root is present measuring 4.3 cm.    Trace mitral valve regurgitation is present    There is no evidence of pericardial effusion      Laboratory evaluations:    Lab Results   Component Value Date    GLUCOSE 103 (H) 05/06/2025    BUN 23 05/06/2025    CREATININE 1.10 05/06/2025    BCR 20.9 05/06/2025    K 5.1 05/06/2025    CO2 24.6 05/06/2025    CALCIUM 9.9 05/06/2025    ALBUMIN 4.2 05/06/2025    AST 27 05/06/2025    ALT 45 (H) 05/06/2025     Lab Results  "  Component Value Date    WBC 13.53 (H) 05/06/2025    HGB 18.9 (H) 05/06/2025    HCT 59.3 (H) 05/06/2025    MCV 87.1 05/06/2025     05/06/2025     Lab Results   Component Value Date    CHOL 145 02/07/2025    TRIG 204 (H) 02/07/2025    HDL 37 (L) 02/07/2025    LDL 74 02/07/2025     Lab Results   Component Value Date    TSH 1.770 12/13/2024     Lab Results   Component Value Date    HGBA1C 6.00 (H) 05/06/2025     Lab Results   Component Value Date    ALT 45 (H) 05/06/2025     Lab Results   Component Value Date    HGBA1C 6.00 (H) 05/06/2025    HGBA1C 5.90 (H) 02/07/2025    HGBA1C 6.20 (H) 09/25/2024     Lab Results   Component Value Date    MICROALBUR <1.2 05/06/2025    CREATININE 1.10 05/06/2025     No results found for: \"IRON\", \"TIBC\", \"FERRITIN\"  Lab Results   Component Value Date    INR 1.28 (H) 12/13/2024    INR 1.1 09/13/2024    PROTIME 16.1 (H) 12/13/2024    PROTIME 11.4 09/13/2024      No components found for: \"ABSOLUTELUNG\"    --------------------------------------------------------------------------------------------------------------------------    ASSESSMENT/PLAN:      Diagnosis Plan   1. Essential hypertension        2. Cerebrovascular accident (CVA) due to embolism of left middle cerebral artery  atorvastatin (LIPITOR) 80 MG tablet      3. Atrial fibrillation, persistent  atorvastatin (LIPITOR) 80 MG tablet    apixaban (Eliquis) 5 MG tablet tablet      4. Acute HFrEF (heart failure with reduced ejection fraction)  atorvastatin (LIPITOR) 80 MG tablet        Assessment & Plan  Persistent atrial fibrillation  History of CVA  Currently in atrial fibrillation with a controlled rate.  Is following up with neurology on Friday.  Recent SHARYN showed resolution of left atrial Penders thrombus.  Is scheduled for ablation with Dr. Duke at the end of May to try to get him back in normal sinus rhythm to help with both LV function and symptoms.  Continue with metoprolol and Eliquis for stroke prevention.    3.  " Nonischemic cardiomyopathy  4.  HFrEF  5. ASCVD  Stable from heart failure standpoint.  Follows with heart failure clinic closely.  Currently on excellent guideline directed medical therapy with metoprolol, Entresto, spironolactone, verquvo and lasix.   SHARYN in March showed EF of 36 to 40%.  Recent heart catheterization showed nonobstructive disease.  Cardiomyopathy likely secondary to underlying atrial fibrillation with above goal heart rates.  Scheduled for ablation with end of this month.  Once ablation is complete and patient has been on guideline directed medical therapy will repeat echocardiogram to evaluate LV function at next visit. ICD placement is currently being deferred after ablation and to see if there is improvement in LV function prior to committing patient to defibrillator.  He was instructed to call the heart failure clinic if he develops any significant weight gain, shortness of breath or lower extremity edema.  Recent lab work stable.  Continue Plavix.  Did agree with PCP in regards to performing colonoscopy as this is age-appropriate guidelines however he declines.    6.  Dyslipidemia  Recent lab panel showed LDL of 74.  Will increase Lipitor to 80 mg daily.  Follow-up with panel in 3 months    This document has been @Electronically signed by CHARISSE Anguiano, 05/12/25, 11:04 AM EDT.       Dictated Utilizing Dragon Dictation: Part of this note may be an electronic transcription/translation of spoken language to printed text using the Dragon Dictation System.      Follow-up appointment and medication changes provided in hand delivered After Visit Summary as well as reviewed in the room.

## 2025-05-13 ENCOUNTER — TELEPHONE (OUTPATIENT)
Dept: CARDIOLOGY | Facility: CLINIC | Age: 70
End: 2025-05-13
Payer: MEDICARE

## 2025-05-13 NOTE — TELEPHONE ENCOUNTER
----- Message from Alen Duke sent at 5/12/2025  3:39 PM EDT -----  Regarding: RE: PVA, missed Eliquis  Yeah I think that should be okay, but hopefully he does not miss anymore.  ----- Message -----  From: Sara Morris RN  Sent: 5/12/2025   9:43 AM EDT  To: Alen Duke MD  Subject: PVA, missed Eliquis                              This patient is scheduled for a PVA with you on 5/29. He missed his Eliquis dose on 5/9 and 5/10. I did educated him on the importance of this leading up to the procedure and not missing any more doses. That gives him about 2 weeks and 4 days of Eliquis prior to his PVA. Are you okay with him proceeding?

## 2025-05-16 ENCOUNTER — OFFICE VISIT (OUTPATIENT)
Dept: NEUROLOGY | Facility: CLINIC | Age: 70
End: 2025-05-16
Payer: MEDICARE

## 2025-05-16 ENCOUNTER — DOCUMENTATION (OUTPATIENT)
Dept: CARDIOLOGY | Facility: HOSPITAL | Age: 70
End: 2025-05-16
Payer: MEDICARE

## 2025-05-16 VITALS
WEIGHT: 216 LBS | OXYGEN SATURATION: 94 % | HEART RATE: 54 BPM | BODY MASS INDEX: 24.99 KG/M2 | HEIGHT: 78 IN | DIASTOLIC BLOOD PRESSURE: 55 MMHG | SYSTOLIC BLOOD PRESSURE: 86 MMHG

## 2025-05-16 DIAGNOSIS — Z86.73 HISTORY OF CVA (CEREBROVASCULAR ACCIDENT): Primary | ICD-10-CM

## 2025-05-16 DIAGNOSIS — R63.4 UNINTENTIONAL WEIGHT LOSS: ICD-10-CM

## 2025-05-16 DIAGNOSIS — I48.91 ATRIAL FIBRILLATION, UNSPECIFIED TYPE: ICD-10-CM

## 2025-05-16 DIAGNOSIS — E78.5 HYPERLIPIDEMIA, UNSPECIFIED HYPERLIPIDEMIA TYPE: ICD-10-CM

## 2025-05-16 DIAGNOSIS — Z72.0 TOBACCO ABUSE: ICD-10-CM

## 2025-05-27 ENCOUNTER — HOSPITAL ENCOUNTER (OUTPATIENT)
Dept: CT IMAGING | Facility: HOSPITAL | Age: 70
Discharge: HOME | End: 2025-05-27
Payer: MEDICARE

## 2025-05-27 ENCOUNTER — PRE-ADMISSION TESTING (OUTPATIENT)
Dept: PREADMISSION TESTING | Facility: HOSPITAL | Age: 70
End: 2025-05-27
Payer: MEDICARE

## 2025-05-27 DIAGNOSIS — I48.19 PERSISTENT ATRIAL FIBRILLATION: ICD-10-CM

## 2025-05-27 DIAGNOSIS — I48.19 ATRIAL FIBRILLATION, PERSISTENT: ICD-10-CM

## 2025-05-27 LAB
ANION GAP SERPL CALCULATED.3IONS-SCNC: 9 MMOL/L (ref 5–15)
BUN SERPL-MCNC: 16 MG/DL (ref 8–23)
BUN/CREAT SERPL: 13.8 (ref 7–25)
CALCIUM SPEC-SCNC: 9.4 MG/DL (ref 8.6–10.5)
CHLORIDE SERPL-SCNC: 103 MMOL/L (ref 98–107)
CO2 SERPL-SCNC: 26 MMOL/L (ref 22–29)
CREAT SERPL-MCNC: 1.16 MG/DL (ref 0.76–1.27)
DEPRECATED RDW RBC AUTO: 45.7 FL (ref 37–54)
EGFRCR SERPLBLD CKD-EPI 2021: 68.2 ML/MIN/1.73
ERYTHROCYTE [DISTWIDTH] IN BLOOD BY AUTOMATED COUNT: 15.7 % (ref 12.3–15.4)
GLUCOSE SERPL-MCNC: 126 MG/DL (ref 65–99)
HCT VFR BLD AUTO: 56.9 % (ref 37.5–51)
HGB BLD-MCNC: 18.4 G/DL (ref 13–17.7)
MCH RBC QN AUTO: 28 PG (ref 26.6–33)
MCHC RBC AUTO-ENTMCNC: 32.3 G/DL (ref 31.5–35.7)
MCV RBC AUTO: 86.5 FL (ref 79–97)
PLATELET # BLD AUTO: 264 10*3/MM3 (ref 140–450)
PMV BLD AUTO: 10.5 FL (ref 6–12)
POTASSIUM SERPL-SCNC: 4.7 MMOL/L (ref 3.5–5.2)
RBC # BLD AUTO: 6.58 10*6/MM3 (ref 4.14–5.8)
SODIUM SERPL-SCNC: 138 MMOL/L (ref 136–145)
WBC NRBC COR # BLD AUTO: 9.24 10*3/MM3 (ref 3.4–10.8)

## 2025-05-27 PROCEDURE — 25510000001 IOPAMIDOL PER 1 ML: Performed by: STUDENT IN AN ORGANIZED HEALTH CARE EDUCATION/TRAINING PROGRAM

## 2025-05-27 PROCEDURE — 71275 CT ANGIOGRAPHY CHEST: CPT

## 2025-05-27 PROCEDURE — 36415 COLL VENOUS BLD VENIPUNCTURE: CPT

## 2025-05-27 PROCEDURE — 80048 BASIC METABOLIC PNL TOTAL CA: CPT

## 2025-05-27 PROCEDURE — 85027 COMPLETE CBC AUTOMATED: CPT

## 2025-05-27 RX ORDER — IOPAMIDOL 755 MG/ML
80 INJECTION, SOLUTION INTRAVASCULAR
Status: COMPLETED | OUTPATIENT
Start: 2025-05-27 | End: 2025-05-27

## 2025-05-27 RX ADMIN — IOPAMIDOL 80 ML: 755 INJECTION, SOLUTION INTRAVENOUS at 10:51

## 2025-05-28 ENCOUNTER — TELEPHONE (OUTPATIENT)
Dept: CARDIOLOGY | Facility: CLINIC | Age: 70
End: 2025-05-28
Payer: MEDICARE

## 2025-05-28 NOTE — TELEPHONE ENCOUNTER
Patient contacted to review upcoming PVA scheduled with Dr. Duke. No answer, LVM requesting return call. Will await.

## 2025-05-28 NOTE — TELEPHONE ENCOUNTER
Patient contacted to review upcoming PVA scheduled with Dr. Duke. Patient reports uninterrupted therapy with Eliquis. No changes to plan of care reported. All questions answered at this time. Patient encouraged to reach out with any questions or concerns post PVA. Patient verbalized understanding.

## 2025-05-29 ENCOUNTER — APPOINTMENT (OUTPATIENT)
Dept: CARDIOLOGY | Facility: HOSPITAL | Age: 70
End: 2025-05-29
Payer: MEDICARE

## 2025-05-29 ENCOUNTER — ANESTHESIA EVENT CONVERTED (OUTPATIENT)
Dept: ANESTHESIOLOGY | Facility: HOSPITAL | Age: 70
End: 2025-05-29
Payer: MEDICARE

## 2025-05-29 ENCOUNTER — ANESTHESIA (OUTPATIENT)
Dept: CARDIOLOGY | Facility: HOSPITAL | Age: 70
End: 2025-05-29
Payer: MEDICARE

## 2025-05-29 ENCOUNTER — ANESTHESIA EVENT (OUTPATIENT)
Dept: CARDIOLOGY | Facility: HOSPITAL | Age: 70
End: 2025-05-29
Payer: MEDICARE

## 2025-05-29 ENCOUNTER — HOSPITAL ENCOUNTER (OUTPATIENT)
Facility: HOSPITAL | Age: 70
Setting detail: HOSPITAL OUTPATIENT SURGERY
Discharge: HOME | End: 2025-05-29
Attending: STUDENT IN AN ORGANIZED HEALTH CARE EDUCATION/TRAINING PROGRAM | Admitting: STUDENT IN AN ORGANIZED HEALTH CARE EDUCATION/TRAINING PROGRAM
Payer: MEDICARE

## 2025-05-29 VITALS
HEIGHT: 78 IN | DIASTOLIC BLOOD PRESSURE: 108 MMHG | BODY MASS INDEX: 25.11 KG/M2 | WEIGHT: 217 LBS | HEART RATE: 67 BPM | OXYGEN SATURATION: 94 % | SYSTOLIC BLOOD PRESSURE: 133 MMHG | RESPIRATION RATE: 26 BRPM | TEMPERATURE: 97.3 F

## 2025-05-29 DIAGNOSIS — I48.19 ATRIAL FIBRILLATION, PERSISTENT: ICD-10-CM

## 2025-05-29 DIAGNOSIS — I48.19 PERSISTENT ATRIAL FIBRILLATION: ICD-10-CM

## 2025-05-29 LAB — GLUCOSE BLDC GLUCOMTR-MCNC: 101 MG/DL (ref 70–130)

## 2025-05-29 PROCEDURE — C1894 INTRO/SHEATH, NON-LASER: HCPCS | Performed by: STUDENT IN AN ORGANIZED HEALTH CARE EDUCATION/TRAINING PROGRAM

## 2025-05-29 PROCEDURE — 25810000003 SODIUM CHLORIDE 0.9 % SOLUTION: Performed by: NURSE ANESTHETIST, CERTIFIED REGISTERED

## 2025-05-29 PROCEDURE — C1760 CLOSURE DEV, VASC: HCPCS | Performed by: STUDENT IN AN ORGANIZED HEALTH CARE EDUCATION/TRAINING PROGRAM

## 2025-05-29 PROCEDURE — 93657 TX L/R ATRIAL FIB ADDL: CPT | Performed by: STUDENT IN AN ORGANIZED HEALTH CARE EDUCATION/TRAINING PROGRAM

## 2025-05-29 PROCEDURE — 25810000003 SODIUM CHLORIDE 0.9 % SOLUTION 250 ML FLEX CONT: Performed by: NURSE ANESTHETIST, CERTIFIED REGISTERED

## 2025-05-29 PROCEDURE — 25010000002 ONDANSETRON PER 1 MG: Performed by: NURSE ANESTHETIST, CERTIFIED REGISTERED

## 2025-05-29 PROCEDURE — C1766 INTRO/SHEATH,STRBLE,NON-PEEL: HCPCS | Performed by: STUDENT IN AN ORGANIZED HEALTH CARE EDUCATION/TRAINING PROGRAM

## 2025-05-29 PROCEDURE — 25010000002 HEPARIN (PORCINE) PER 1000 UNITS: Performed by: STUDENT IN AN ORGANIZED HEALTH CARE EDUCATION/TRAINING PROGRAM

## 2025-05-29 PROCEDURE — C1732 CATH, EP, DIAG/ABL, 3D/VECT: HCPCS | Performed by: STUDENT IN AN ORGANIZED HEALTH CARE EDUCATION/TRAINING PROGRAM

## 2025-05-29 PROCEDURE — C1759 CATH, INTRA ECHOCARDIOGRAPHY: HCPCS | Performed by: STUDENT IN AN ORGANIZED HEALTH CARE EDUCATION/TRAINING PROGRAM

## 2025-05-29 PROCEDURE — 93656 COMPRE EP EVAL ABLTJ ATR FIB: CPT | Performed by: STUDENT IN AN ORGANIZED HEALTH CARE EDUCATION/TRAINING PROGRAM

## 2025-05-29 PROCEDURE — 25010000002 LIDOCAINE PF 1% 1 % SOLUTION: Performed by: NURSE ANESTHETIST, CERTIFIED REGISTERED

## 2025-05-29 PROCEDURE — 93308 TTE F-UP OR LMTD: CPT

## 2025-05-29 PROCEDURE — 25010000002 PROPOFOL 10 MG/ML EMULSION: Performed by: NURSE ANESTHETIST, CERTIFIED REGISTERED

## 2025-05-29 PROCEDURE — 25010000002 PHENYLEPHRINE 10 MG/ML SOLUTION 1 ML VIAL: Performed by: NURSE ANESTHETIST, CERTIFIED REGISTERED

## 2025-05-29 PROCEDURE — 82948 REAGENT STRIP/BLOOD GLUCOSE: CPT

## 2025-05-29 PROCEDURE — 25010000002 DEXAMETHASONE PER 1 MG: Performed by: NURSE ANESTHETIST, CERTIFIED REGISTERED

## 2025-05-29 PROCEDURE — C1730 CATH, EP, 19 OR FEW ELECT: HCPCS | Performed by: STUDENT IN AN ORGANIZED HEALTH CARE EDUCATION/TRAINING PROGRAM

## 2025-05-29 PROCEDURE — 25010000002 FAMOTIDINE 10 MG/ML SOLUTION: Performed by: ANESTHESIOLOGY

## 2025-05-29 PROCEDURE — 25010000002 PROTAMINE SULFATE PER 10 MG: Performed by: STUDENT IN AN ORGANIZED HEALTH CARE EDUCATION/TRAINING PROGRAM

## 2025-05-29 PROCEDURE — C1733 CATH, EP, OTHR THAN COOL-TIP: HCPCS | Performed by: STUDENT IN AN ORGANIZED HEALTH CARE EDUCATION/TRAINING PROGRAM

## 2025-05-29 PROCEDURE — 93308 TTE F-UP OR LMTD: CPT | Performed by: INTERNAL MEDICINE

## 2025-05-29 PROCEDURE — 25010000002 PHENYLEPHRINE 10 MG/ML SOLUTION: Performed by: NURSE ANESTHETIST, CERTIFIED REGISTERED

## 2025-05-29 PROCEDURE — 85347 COAGULATION TIME ACTIVATED: CPT

## 2025-05-29 PROCEDURE — 25010000002 ESMOLOL 100 MG/10ML SOLUTION: Performed by: NURSE ANESTHETIST, CERTIFIED REGISTERED

## 2025-05-29 PROCEDURE — 25010000002 LIDOCAINE PF 1% 1 % SOLUTION: Performed by: STUDENT IN AN ORGANIZED HEALTH CARE EDUCATION/TRAINING PROGRAM

## 2025-05-29 PROCEDURE — 93655 ICAR CATH ABLTJ DSCRT ARRHYT: CPT | Performed by: STUDENT IN AN ORGANIZED HEALTH CARE EDUCATION/TRAINING PROGRAM

## 2025-05-29 PROCEDURE — 25010000002 SUGAMMADEX 200 MG/2ML SOLUTION: Performed by: NURSE ANESTHETIST, CERTIFIED REGISTERED

## 2025-05-29 RX ORDER — MIDAZOLAM HYDROCHLORIDE 1 MG/ML
0.5 INJECTION, SOLUTION INTRAMUSCULAR; INTRAVENOUS
Status: DISCONTINUED | OUTPATIENT
Start: 2025-05-29 | End: 2025-05-29 | Stop reason: HOSPADM

## 2025-05-29 RX ORDER — FAMOTIDINE 20 MG/1
20 TABLET, FILM COATED ORAL ONCE
Status: DISCONTINUED | OUTPATIENT
Start: 2025-05-29 | End: 2025-05-29 | Stop reason: HOSPADM

## 2025-05-29 RX ORDER — SODIUM CHLORIDE 9 MG/ML
INJECTION, SOLUTION INTRAVENOUS CONTINUOUS PRN
Status: DISCONTINUED | OUTPATIENT
Start: 2025-05-29 | End: 2025-05-29 | Stop reason: SURG

## 2025-05-29 RX ORDER — FAMOTIDINE 10 MG/ML
20 INJECTION, SOLUTION INTRAVENOUS ONCE
Status: COMPLETED | OUTPATIENT
Start: 2025-05-29 | End: 2025-05-29

## 2025-05-29 RX ORDER — DEXAMETHASONE SODIUM PHOSPHATE 10 MG/ML
INJECTION, SOLUTION INTRA-ARTICULAR; INTRALESIONAL; INTRAMUSCULAR; INTRAVENOUS; SOFT TISSUE AS NEEDED
Status: DISCONTINUED | OUTPATIENT
Start: 2025-05-29 | End: 2025-05-29 | Stop reason: SURG

## 2025-05-29 RX ORDER — LIDOCAINE HYDROCHLORIDE 10 MG/ML
INJECTION, SOLUTION EPIDURAL; INFILTRATION; INTRACAUDAL; PERINEURAL AS NEEDED
Status: DISCONTINUED | OUTPATIENT
Start: 2025-05-29 | End: 2025-05-29 | Stop reason: SURG

## 2025-05-29 RX ORDER — LIDOCAINE HYDROCHLORIDE 10 MG/ML
0.5 INJECTION, SOLUTION EPIDURAL; INFILTRATION; INTRACAUDAL; PERINEURAL ONCE AS NEEDED
Status: DISCONTINUED | OUTPATIENT
Start: 2025-05-29 | End: 2025-05-29 | Stop reason: HOSPADM

## 2025-05-29 RX ORDER — NITROGLYCERIN 0.4 MG/1
0.4 TABLET SUBLINGUAL
Status: DISCONTINUED | OUTPATIENT
Start: 2025-05-29 | End: 2025-05-29 | Stop reason: HOSPADM

## 2025-05-29 RX ORDER — SODIUM CHLORIDE 0.9 % (FLUSH) 0.9 %
3 SYRINGE (ML) INJECTION EVERY 12 HOURS SCHEDULED
Status: DISCONTINUED | OUTPATIENT
Start: 2025-05-29 | End: 2025-05-29 | Stop reason: HOSPADM

## 2025-05-29 RX ORDER — ACETAMINOPHEN 325 MG/1
650 TABLET ORAL EVERY 4 HOURS PRN
Status: DISCONTINUED | OUTPATIENT
Start: 2025-05-29 | End: 2025-05-29 | Stop reason: HOSPADM

## 2025-05-29 RX ORDER — METOPROLOL SUCCINATE 100 MG/1
50 TABLET, EXTENDED RELEASE ORAL NIGHTLY
Start: 2025-05-29

## 2025-05-29 RX ORDER — SODIUM CHLORIDE 0.9 % (FLUSH) 0.9 %
10 SYRINGE (ML) INJECTION EVERY 12 HOURS SCHEDULED
Status: DISCONTINUED | OUTPATIENT
Start: 2025-05-29 | End: 2025-05-29 | Stop reason: HOSPADM

## 2025-05-29 RX ORDER — ESMOLOL HYDROCHLORIDE 10 MG/ML
INJECTION INTRAVENOUS AS NEEDED
Status: DISCONTINUED | OUTPATIENT
Start: 2025-05-29 | End: 2025-05-29 | Stop reason: SURG

## 2025-05-29 RX ORDER — PROPOFOL 10 MG/ML
VIAL (ML) INTRAVENOUS AS NEEDED
Status: DISCONTINUED | OUTPATIENT
Start: 2025-05-29 | End: 2025-05-29 | Stop reason: SURG

## 2025-05-29 RX ORDER — VECURONIUM BROMIDE 1 MG/ML
INJECTION, POWDER, LYOPHILIZED, FOR SOLUTION INTRAVENOUS AS NEEDED
Status: DISCONTINUED | OUTPATIENT
Start: 2025-05-29 | End: 2025-05-29 | Stop reason: SURG

## 2025-05-29 RX ORDER — PROTAMINE SULFATE 10 MG/ML
INJECTION, SOLUTION INTRAVENOUS
Status: DISCONTINUED | OUTPATIENT
Start: 2025-05-29 | End: 2025-05-29 | Stop reason: HOSPADM

## 2025-05-29 RX ORDER — ONDANSETRON 2 MG/ML
4 INJECTION INTRAMUSCULAR; INTRAVENOUS ONCE AS NEEDED
Status: DISCONTINUED | OUTPATIENT
Start: 2025-05-29 | End: 2025-05-29 | Stop reason: HOSPADM

## 2025-05-29 RX ORDER — ONDANSETRON 2 MG/ML
INJECTION INTRAMUSCULAR; INTRAVENOUS AS NEEDED
Status: DISCONTINUED | OUTPATIENT
Start: 2025-05-29 | End: 2025-05-29 | Stop reason: SURG

## 2025-05-29 RX ORDER — FENTANYL CITRATE 50 UG/ML
50 INJECTION, SOLUTION INTRAMUSCULAR; INTRAVENOUS
Status: DISCONTINUED | OUTPATIENT
Start: 2025-05-29 | End: 2025-05-29 | Stop reason: HOSPADM

## 2025-05-29 RX ORDER — PHENYLEPHRINE HYDROCHLORIDE 10 MG/ML
INJECTION INTRAVENOUS AS NEEDED
Status: DISCONTINUED | OUTPATIENT
Start: 2025-05-29 | End: 2025-05-29 | Stop reason: SURG

## 2025-05-29 RX ORDER — SODIUM CHLORIDE, SODIUM LACTATE, POTASSIUM CHLORIDE, CALCIUM CHLORIDE 600; 310; 30; 20 MG/100ML; MG/100ML; MG/100ML; MG/100ML
9 INJECTION, SOLUTION INTRAVENOUS CONTINUOUS
Status: DISCONTINUED | OUTPATIENT
Start: 2025-05-30 | End: 2025-05-29 | Stop reason: HOSPADM

## 2025-05-29 RX ORDER — ONDANSETRON 2 MG/ML
4 INJECTION INTRAMUSCULAR; INTRAVENOUS EVERY 6 HOURS PRN
Status: DISCONTINUED | OUTPATIENT
Start: 2025-05-29 | End: 2025-05-29 | Stop reason: HOSPADM

## 2025-05-29 RX ORDER — HEPARIN SODIUM 1000 [USP'U]/ML
INJECTION, SOLUTION INTRAVENOUS; SUBCUTANEOUS
Status: DISCONTINUED | OUTPATIENT
Start: 2025-05-29 | End: 2025-05-29 | Stop reason: HOSPADM

## 2025-05-29 RX ORDER — SODIUM CHLORIDE 9 MG/ML
40 INJECTION, SOLUTION INTRAVENOUS AS NEEDED
Status: DISCONTINUED | OUTPATIENT
Start: 2025-05-29 | End: 2025-05-29 | Stop reason: HOSPADM

## 2025-05-29 RX ORDER — ACETAMINOPHEN 650 MG/1
650 SUPPOSITORY RECTAL EVERY 4 HOURS PRN
Status: DISCONTINUED | OUTPATIENT
Start: 2025-05-29 | End: 2025-05-29 | Stop reason: HOSPADM

## 2025-05-29 RX ORDER — HEPARIN SODIUM 10000 [USP'U]/100ML
INJECTION, SOLUTION INTRAVENOUS
Status: DISCONTINUED | OUTPATIENT
Start: 2025-05-29 | End: 2025-05-29 | Stop reason: HOSPADM

## 2025-05-29 RX ORDER — LIDOCAINE HYDROCHLORIDE 10 MG/ML
INJECTION, SOLUTION EPIDURAL; INFILTRATION; INTRACAUDAL; PERINEURAL
Status: DISCONTINUED | OUTPATIENT
Start: 2025-05-29 | End: 2025-05-29 | Stop reason: HOSPADM

## 2025-05-29 RX ORDER — HYDROMORPHONE HYDROCHLORIDE 1 MG/ML
0.5 INJECTION, SOLUTION INTRAMUSCULAR; INTRAVENOUS; SUBCUTANEOUS
Status: DISCONTINUED | OUTPATIENT
Start: 2025-05-29 | End: 2025-05-29 | Stop reason: HOSPADM

## 2025-05-29 RX ORDER — ROCURONIUM BROMIDE 10 MG/ML
INJECTION, SOLUTION INTRAVENOUS AS NEEDED
Status: DISCONTINUED | OUTPATIENT
Start: 2025-05-29 | End: 2025-05-29 | Stop reason: SURG

## 2025-05-29 RX ORDER — SODIUM CHLORIDE 0.9 % (FLUSH) 0.9 %
10 SYRINGE (ML) INJECTION AS NEEDED
Status: DISCONTINUED | OUTPATIENT
Start: 2025-05-29 | End: 2025-05-29 | Stop reason: HOSPADM

## 2025-05-29 RX ADMIN — PHENYLEPHRINE HYDROCHLORIDE 200 MCG: 10 INJECTION INTRAVENOUS at 07:50

## 2025-05-29 RX ADMIN — ROCURONIUM BROMIDE 50 MG: 10 INJECTION INTRAVENOUS at 09:04

## 2025-05-29 RX ADMIN — LIDOCAINE HYDROCHLORIDE 50 MG: 10 INJECTION, SOLUTION EPIDURAL; INFILTRATION; INTRACAUDAL; PERINEURAL at 07:38

## 2025-05-29 RX ADMIN — ONDANSETRON 4 MG: 2 INJECTION INTRAMUSCULAR; INTRAVENOUS at 10:14

## 2025-05-29 RX ADMIN — PHENYLEPHRINE HYDROCHLORIDE 200 MCG: 10 INJECTION INTRAVENOUS at 07:40

## 2025-05-29 RX ADMIN — PROPOFOL 150 MG: 10 INJECTION, EMULSION INTRAVENOUS at 07:38

## 2025-05-29 RX ADMIN — DEXAMETHASONE SODIUM PHOSPHATE 4 MG: 10 INJECTION INTRAMUSCULAR; INTRAVENOUS at 07:51

## 2025-05-29 RX ADMIN — VECURONIUM BROMIDE 10 MG: 1 INJECTION, POWDER, LYOPHILIZED, FOR SOLUTION INTRAVENOUS at 07:38

## 2025-05-29 RX ADMIN — PHENYLEPHRINE HYDROCHLORIDE 1 MCG: 10 INJECTION INTRAVENOUS at 07:58

## 2025-05-29 RX ADMIN — ESMOLOL HYDROCHLORIDE 100 MG: 10 INJECTION, SOLUTION INTRAVENOUS at 07:38

## 2025-05-29 RX ADMIN — SUGAMMADEX 200 MG: 100 INJECTION, SOLUTION INTRAVENOUS at 10:14

## 2025-05-29 RX ADMIN — FAMOTIDINE 20 MG: 10 INJECTION, SOLUTION INTRAVENOUS at 06:51

## 2025-05-29 RX ADMIN — PHENYLEPHRINE HYDROCHLORIDE 0.5 MCG/KG/MIN: 10 INJECTION INTRAVENOUS at 07:50

## 2025-05-29 RX ADMIN — SODIUM CHLORIDE: 9 INJECTION, SOLUTION INTRAVENOUS at 07:28

## 2025-05-29 NOTE — ANESTHESIA PROCEDURE NOTES
Airway  Reason: elective    Date/Time: 5/29/2025 7:39 AM  Airway not difficult    General Information and Staff    Patient location during procedure: OR  CRNA/CAA: Abel Jones CRNA    Indications and Patient Condition  Indications for airway management: airway protection    Preoxygenated: yes  MILS not maintained throughout    Mask difficulty assessment: 2 - vent by mask + OA or adjuvant +/- NMBA    Final Airway Details    Final airway type: endotracheal airway      Successful airway: ETT  Cuffed: yes   Successful intubation technique: direct laryngoscopy  Endotracheal tube insertion site: oral  Blade: Vinnie  Blade size: 4  ETT size (mm): 8.0  Cormack-Lehane Classification: grade IIa - partial view of glottis  Placement verified by: chest auscultation and capnometry   Measured from: lips  ETT/EBT  to lips (cm): 22  Number of attempts at approach: 1  Assessment: lips, teeth, and gum same as pre-op and atraumatic intubation    Additional Comments  Negative epigastric sounds, Breath sound equal bilaterally with symmetric chest rise and fall

## 2025-05-29 NOTE — Clinical Note
Replaced previous sheath in the right femoral vein. 8fr rfv sheath exchanged for faradrive sheath over [employer] WIRE

## 2025-05-29 NOTE — ANESTHESIA POSTPROCEDURE EVALUATION
Patient: Anshul Andrews    Procedure Summary       Date: 05/29/25 Room / Location: [facility] CATH[facility]EP LAB [facility] / [facility] EP INVASIVE LOCATION    Anesthesia Start: 0728 Anesthesia Stop: 1023    Procedure: Ablation atrial fibrillation/ PFA; No meds to hold Diagnosis:       Persistent atrial fibrillation      (Afib)    Providers: Alen Duke MD Provider: Ananda Omer MD    Anesthesia Type: general ASA Status: 3            Anesthesia Type: general    Vitals  No vitals data found for the desired time range.          Post Anesthesia Care and Evaluation    Patient location: cath lab.  Patient participation: complete - patient participated  Level of consciousness: responsive to verbal stimuli  Pain score: 0  Pain management: adequate    Airway patency: patent  Anesthetic complications: No anesthetic complications  PONV Status: none  Cardiovascular status: hemodynamically stable and acceptable  Respiratory status: nonlabored ventilation, acceptable, nasal cannula and spontaneous ventilation  Hydration status: acceptable    Comments: VSS  No anesthesia care post op

## 2025-05-29 NOTE — Clinical Note
BLOOD PRESSURE CONTINUES TO NOT READ, MD BIJAL LYNCH AND MARINA WHITEHEAD ANESTHESIA AT BEDSIDE AND AWARE

## 2025-05-29 NOTE — ANESTHESIA PREPROCEDURE EVALUATION
Anesthesia Evaluation                  Airway   Mallampati: I  TM distance: >3 FB  Neck ROM: full  No difficulty expected  Dental      Pulmonary    (+) ,shortness of breath, sleep apnea  Cardiovascular     ECG reviewed    (+) hypertension, dysrhythmias Atrial Fib, CHF , SHERIFF    ROS comment: Ef 36-40%    Neuro/Psych  (+) CVA  GI/Hepatic/Renal/Endo    (+) obesity, diabetes mellitus    Musculoskeletal     Abdominal    Substance History      OB/GYN          Other                    Anesthesia Plan    ASA 3     general     intravenous induction     Anesthetic plan, risks, benefits, and alternatives have been provided, discussed and informed consent has been obtained with: patient.    Plan discussed with CRNA.    CODE STATUS:

## 2025-05-29 NOTE — H&P
Pre-cardiac Ablation History and Physical  [facility]      Patient:  Anshul Andrews  :  1955  MRN: [id number]    PCP:  Rosaura Guzman APRN  PHONE:  [phone]    DATE: 2025  ID: Anshul Andrews is a 70 y.o. male from [facility]    CC: Palpitations, A-fib    Problem List:  Persistent atrial fibrillation  SHARYN 2024: LVEF 30%, moderate to severe global hypokinesis of the LV, severe LA enlargement, mobile NIMA thrombi  CVA  [facility] admission -10/24 embolic stroke to right MCA s/p TNK and thrombectomy, [facility]   HFrEF  SHARYN 2024: LVEF 30%  TTE 2024: LVEF 31-35%, no changes from prior SHARYN  Coronary CTA pending  Diabetes  Hypertension  Polycythemia  Tobacco use    BRIEF HPI:   Anshul Andrews is a 70 y.o. male with the above medical history who presents for pulmonary vein ablation.  Patient was not aware of any cardiac problems until he suffered a stroke in 2024.  They admit to potentially feeling poorly for about 2 weeks prior to that.  [place] in [place] when he lost consciousness.  He does not remember anything else until after he woke up in the ambulance.  He was found to have a embolic stroke and underwent thrombectomy at [facility].  He was in atrial fibrillation and found to have a significant grade of left atrial appendage thrombus.  Was also found to have a reduced EF around 30% at that time.  Was started on anticoagulation and discharged.     He is overall recovered quite well from his stroke.  He has been following up with Dr. Martines in [place].  Most recent echo in 2024 showed an EF of 31 to 35%.  He is scheduled for coronary CTA which is pending.  Has remained in atrial fibrillation since then.  Uncertain about his degree of rate control.  He has had no significant changes since seeing Dr. Duke in the office in early March.  He still has some fatigue and shortness of  "breath on exertion.      Allergies:      No Known Allergies    MEDICATIONS:  Current Outpatient Medications   Medication Instructions    albuterol sulfate  (90 Base) MCG/ACT inhaler 2 puffs, Inhalation, Every 4 Hours PRN    apixaban (ELIQUIS) 5 mg, Oral, Every 12 Hours Scheduled    atorvastatin (LIPITOR) 80 mg, Oral, Daily    Fluticasone Furoate-Vilanterol (Breo Ellipta) 200-25 MCG/ACT inhaler 1 puff, Inhalation, Daily - RT    furosemide (LASIX) 20 mg, Oral, Daily PRN    metoprolol succinate XL (TOPROL-XL) 100 mg, Oral, Nightly    sacubitril-valsartan (Entresto) 24-26 MG tablet 1 tablet, Oral, 2 Times Daily    spironolactone (ALDACTONE) 12.5 mg, Oral, Daily    Verquvo 2.5 mg, Oral, Daily       Past medical & surgical history, social and family history reviewed in the electronic medical record.    ROS: Pertinent positives listed in the HPI and problem list above. All others reviewed and negative.     Physical Exam:   BP (!) 86/66 (BP Location: Left arm)   Pulse 83   Temp 97.4 °F (36.3 °C) (Temporal)   Resp 16   Ht 198.1 cm (78\")   Wt 98.4 kg (217 lb)   SpO2 97%   BMI 25.08 kg/m²     Constitutional:    Well-appearing 70 y.o. y/o adult  in no acute distress        Heart:  Irregularly irregular rhythm and normal rate, no murmurs, rubs or gallops   Lungs:     Clear to auscultation bilaterally, no wheezes, rhales or rhonchi, nonlabored respirations       Extremities:   No gross deformities, no edema, clubbing, or cyanosis.    Pulses:    Neuro:  Psych:   Radial pulses palpable and equal bilaterally.  No gross focal deficits  Mood and behavior appropriate for situation     \    Labs and Diagnostic Data:  Results from last 7 days   Lab Units 05/27/25  1052   SODIUM mmol/L 138   POTASSIUM mmol/L 4.7   CHLORIDE mmol/L 103   CO2 mmol/L 26.0   BUN mg/dL 16   CREATININE mg/dL 1.16   GLUCOSE mg/dL 126*   CALCIUM mg/dL 9.4     Results from last 7 days   Lab Units 05/27/25  1052   WBC 10*3/mm3 9.24   HEMOGLOBIN g/dL " 18.4*   HEMATOCRIT % 56.9*   PLATELETS 10*3/mm3 264     Lab Results   Component Value Date    CHOL 145 02/07/2025    TRIG 204 (H) 02/07/2025    HDL 37 (L) 02/07/2025    LDL 74 02/07/2025    AST 27 05/06/2025    ALT 45 (H) 05/06/2025                   Tele: afib    IMPRESSION:  Mr. Andrews is a 71 y/o male with persistent afib who present for PVA.    PLAN:  Procedure to perform: PVA. Risks, benefits and alternatives to the procedure explained to the patient and he understands and wishes to proceed.     Electronically signed by Marty Purdy PA-C, 05/29/25, 7:15 AM EDT.

## 2025-05-29 NOTE — Clinical Note
PATIENT VERBALIZES IMPROVED SHORTNESS OF BREATH. NONREBREATHER REMOVED, PLACED ON 4L O2 NASAL CANNULA.  Yes

## 2025-05-29 NOTE — ANESTHESIA PROCEDURE NOTES
Arterial Line      Patient reassessed immediately prior to procedure    Patient location during procedure: OR   Line placed for hemodynamic monitoring.  Performed By   MARINA/CAA: Abel Jones CRNA   Preanesthetic Checklist  Completed: patient identified, IV checked, site marked, risks and benefits discussed, surgical consent, monitors and equipment checked, pre-op evaluation and timeout performed  Arterial Line Prep    Sterile Tech: cap, gloves and sterile barriers  Prep: ChloraPrep  Patient monitoring: blood pressure monitoring, continuous pulse oximetry and EKG  Arterial Line Procedure   Laterality:right  Location:  radial artery  Catheter size: 20 G   Guidance: ultrasound guided  PROCEDURE NOTE/ULTRASOUND INTERPRETATION.  Using ultrasound guidance the potential vascular sites for insertion of the catheter were visualized to determine the patency of the vessel to be used for vascular access.  After selecting the appropriate site for insertion, the needle was visualized under ultrasound being inserted into the radial artery, followed by ultrasound confirmation of wire and catheter placement. There were no abnormalities seen on ultrasound; an image was taken; and the patient tolerated the procedure with no complications.   Number of attempts: 1  Successful placement: yes Images: still images not obtained  Post Assessment   Dressing Type: line sutured, occlusive dressing applied, secured with tape and wrist guard applied.   Complications no  Circ/Move/Sens Assessment: normal and unchanged.   Patient Tolerance: patient tolerated the procedure well with no apparent complications

## 2025-05-29 NOTE — Clinical Note
BLOOD PRESSURES  CONTINUE TO NOT READ VIA PRUCKA AND DYNAMAP. MD BIJAL LYNCH AT BEDSIDE AND ANESTHESIA AT BEDSIDE AND AWARE.

## 2025-05-30 ENCOUNTER — CALL CENTER PROGRAMS (OUTPATIENT)
Dept: CALL CENTER | Facility: HOSPITAL | Age: 70
End: 2025-05-30
Payer: MEDICARE

## 2025-05-30 LAB
ACT BLD: 366 SECONDS (ref 82–152)
ACT BLD: 383 SECONDS (ref 82–152)
ACT BLD: 406 SECONDS (ref 82–152)
ACT BLD: 446 SECONDS (ref 82–152)

## 2025-05-30 NOTE — OUTREACH NOTE
PCI/Device Survey      Flowsheet Row Responses   Facility patient discharged from? [facility]   Procedure date 05/29/25   Procedure (if device, specify in description) Ablation   Performing MD Dr. Alen Duke   Attempt successful? Yes   Call start time 0948   Call end time 0958   Person spoke with today (if not patient) and relationship Patient   Has the patient had any of the following symptoms since discharge? --  [No complaints.]   Is the patient taking prescribed medications: Apixaban   Nursing intervention Reminded to continue to take prescribed medications   Does the patient have any of the following symptoms related to the cath/surgical site? --  [Dressings to groin sites remain intact. Patient reports spot of blood on one, the other one clean.]   Does the patient have an appointment scheduled with the cardiologist? Yes   Appointment comments New Patient with Scott Garnett  Tuesday Jul 1, 2025 1:30 PM, Hospital Follow Up with Alen Duke  Friday Sep 5, 2025 10:30 AM   If the patient is a current smoker, are they able to teach back resources for cessation? Smoking cessation medications  [Patient reports that he is down to 1-2 cigarettes a day.]   Did the patient feel prepared to go home on the same day as the procedure? Yes   Is the patient satisfied with the same day discharge process? Yes   PCI/Device call completed Yes   Wrap up additional comments Patient reports that he is doing well this am. All questions addressed.            BELINDA MOORE - Registered Nurse

## 2025-05-30 NOTE — OUTREACH NOTE
PCI/Device Survey      Flowsheet Row Responses   Facility patient discharged from? [facility]   Procedure date 05/29/25   Procedure (if device, specify in description) Ablation   Performing MD Dr. Alen Duke   Attempt successful? No   Unsuccessful attempts Attempt 1            BELINDA MOORE - Registered Nurse

## 2025-05-31 NOTE — PROGRESS NOTES
New Patient Office Visit      Encounter Date: 2025   Patient Name: Anshul Andrews  : 1955   MRN: [id number]   PCP: Rosaura Guzman APRN    Referring Provider: Josh Martines MD     Chief Complaint:  No chief complaint on file.      History of Present Illness: Anshul Andrews is a 70 y.o. male with known medical diagnoses of atrial fibrillation currently on Eliquis 5 mg twice daily, polycythemia, T2DM, HTN, tobacco abuse, and CAD stenosis who was working at his job in 2024 when he developed sudden left facial droop, and left hemiplegia.  Patient was taken immediately to ED and received TNK with a right MCA thrombectomy.  He was found to have a distal right M2 sylvian branch occlusion.  MRI demonstrated scattered right MCA infarct.  TTE showed ejection fraction of 30 to 35% with multiple left atrial thrombi.  Patient was also discovered to have new onset A-fib with RVR and the patient was initiated on Eliquis 5 mg twice daily after CT head on  was determined to be stable with minimal petechial hemorrhage.  Patient was discharged to [facility] rehab facility on Eliquis 5 mg twice daily, aspirin 81 mg, and statin.     Clinic Visit 2025: Patient presents to [facility] neurology stroke clinic to establish care.  Patient reports no new or worsening strokelike symptoms.  He did remarkably well throughout his course of rehabilitation.  Patient reports feeling near his baseline level of function.  On exam today NIH 0.  No focal weakness or numbness detected.  No ataxia or coordination deficits.  No language or vision disturbance.  Normal gait.  Patient continues to take Eliquis 5 mg twice daily without any reports of missed doses.  His aspirin 81 mg discharged from level was switched to Plavix 75 mg by cardiology given history of CAD stenosis.  Patient follows regularly with cardiology and has a planned A-fib ablation scheduled for  with Dr. Duke.   Patient continues Lipitor 40 mg nightly without any reported myalgia.  I did question patient concerning tobacco use and he states he is currently smoking approximately 2 cigarettes/day.  Nicotine patches to assist with cessation are not warranted given current level of cigarette smoking and patient tells me he is currently only smoking out of habit rather than requiring the nicotine.  I have encouraged total cessation and education has been provided.  Only additional concern is some unintentional weight loss.  Patient current weight is 217 on today's visit.  Chart review indicates in November 2024 patient's weight was 240 pounds and in October 2024 was 256 pounds.  Patient's family medicine doctor has encouraged a colonoscopy however the patient has refused.  I discussed this further with him today and for any continued weight loss I would insist that the patient follow-up with gastroenterology and schedule colonoscopy for further evaluations.  In the interim I have encouraged supplementation of calories with boost increase caloric drinks along with healthy cardiac diet with Mediterranean diet recommended for secondary stroke prevention.  patient will follow-up with PCP for routine labs and has follow-up scheduled.  Signs and symptoms of stroke reviewed with patient and he was in agreement to call 911 for any new or worsening symptoms.  BP was low today at 86/55, however patient reported feeling in normal state of health and was asymptomatic.  He reports his blood pressure is normally low in reviewing prior charts on 5/12/2025 BP equals 102/62 and on 5/6/2025 BP equals 88/70.  I have encouraged patient to increase hydration with at least 72 ounces of water daily in order to ensure adequate volume of fluid .  Encourage blood pressure checks at home .  Patient currently on metoprolol for rate control cardiology however his heart rate has been consistently lower with heart rate today at 54.  Patient will discuss  medications with cardiology. no additional questions or concerns today    Stroke Risk Factors: atrial fibrillation, diabetes mellitus, hyperlipidemia, hypertension, and smoking      Subjective      Past Medical History:   Past Medical History:   Diagnosis Date    Atrial fibrillation     Diabetes mellitus     Stroke        Past Surgical History:   Past Surgical History:   Procedure Laterality Date    CARDIAC CATHETERIZATION Right 04/18/2025    Procedure: Left Heart Cath;  Surgeon: Jeremy Babb MD;  Location:  [facility];  Service: Cardiovascular;  Laterality: Right;    CARDIAC CATHETERIZATION  04/18/2025    Procedure: Functional Flow [place];  Surgeon: Jeremy Babb MD;  Location:  [facility];  Service: Cardiovascular;;    CARDIAC ELECTROPHYSIOLOGY PROCEDURE N/A 5/29/2025    Procedure: Ablation atrial fibrillation/ PFA; No meds to hold;  Surgeon: Alen Duke MD;  Location:  [facility] INVASIVE LOCATION;  Service: Cardiovascular;  Laterality: N/A;  Alen Duke MD Edelen, Justina, RN; Trang Styles  Can we set him up for PFA.  No meds to hold.  We did need to make sure that he gets a transesophageal echo at [facility] prior to the procedure date.  I sent a message to    HERNIA REPAIR      umbilical    KNEE SURGERY         Family History:   Family History   Problem Relation Age of Onset    Heart attack Father     Heart attack Paternal Uncle        Social History:   Social History     Socioeconomic History    Marital status: Single   Tobacco Use    Smoking status: Some Days     Average packs/day: 1 pack/day for 25.3 years (25.3 ttl pk-yrs)     Types: Cigarettes     Start date: 1/1/2000    Smokeless tobacco: Never   Vaping Use    Vaping status: Never Used   Substance and Sexual Activity    Alcohol use: Not Currently    Drug use: Yes     Types: Marijuana     Comment: last use a month ago    Sexual activity: Defer       Medications:     Current Outpatient Medications:      "albuterol sulfate  (90 Base) MCG/ACT inhaler, Inhale 2 puffs Every 4 (Four) Hours As Needed for Wheezing., Disp: 8 g, Rfl: 2    apixaban (Eliquis) 5 MG tablet tablet, Take 1 tablet by mouth Every 12 (Twelve) Hours., Disp: 180 tablet, Rfl: 2    atorvastatin (LIPITOR) 80 MG tablet, Take 1 tablet by mouth Daily., Disp: 90 tablet, Rfl: 2    Fluticasone Furoate-Vilanterol (Breo Ellipta) 200-25 MCG/ACT inhaler, Inhale 1 puff Daily., Disp: 30 each, Rfl: 11    sacubitril-valsartan (Entresto) 24-26 MG tablet, Take 1 tablet by mouth 2 (Two) Times a Day for 180 days., Disp: 180 tablet, Rfl: 1    Vericiguat 2.5 MG tablet, Take 1 tablet by mouth Daily for 180 days., Disp: 90 tablet, Rfl: 1    furosemide (Lasix) 20 MG tablet, Take 1 tablet by mouth Daily As Needed (Leg swelling)., Disp: 30 tablet, Rfl: 0    metoprolol succinate XL (TOPROL-XL) 100 MG 24 hr tablet, Take 0.5 tablets by mouth Every Night., Disp: , Rfl:     Allergies:   No Known Allergies    Objective     Physical Exam:  Vital Signs:   Vitals:    05/16/25 0831   BP: (!) 86/55   BP Location: Right arm   Patient Position: Sitting   Cuff Size: Small Adult   Pulse: 54   SpO2: 94%   Weight: 98 kg (216 lb)   Height: 198.1 cm (78\")     Body mass index is 24.96 kg/m².     Physical Exam  Constitutional:       General: He is not in acute distress.     Appearance: He is not ill-appearing.   HENT:      Head: Normocephalic and atraumatic.   Eyes:      General: Lids are normal.      Extraocular Movements: Extraocular movements intact.      Pupils: Pupils are equal, round, and reactive to light.   Cardiovascular:      Rate and Rhythm: Bradycardia present.   Pulmonary:      Effort: Pulmonary effort is normal. No respiratory distress.   Abdominal:      General: There is no distension.   Musculoskeletal:         General: Normal range of motion.      Right lower leg: No edema.      Left lower leg: No edema.   Skin:     General: Skin is warm and dry.      Findings: Bruising " present.   Neurological:      General: No focal deficit present.      Mental Status: He is oriented to person, place, and time. Mental status is at baseline.      Motor: Motor strength is normal.  Psychiatric:         Mood and Affect: Mood normal.         Speech: Speech normal.       Neurological Exam  Mental Status  Awake, alert and oriented to person, place and time. Oriented to person, place and time. Oriented to person, place, and time. Speech is normal. Language is fluent with no aphasia. Attention and concentration are normal. Fund of knowledge is appropriate for level of education.    Cranial Nerves  CN II: Visual fields full to confrontation.  CN III, IV, VI: Extraocular movements intact bilaterally. Normal lids and orbits bilaterally. Pupils equal round and reactive to light bilaterally.  CN V: Facial sensation is normal.  CN VII: Full and symmetric facial movement.    Motor  Normal muscle bulk throughout. No fasciculations present. Normal muscle tone. Strength is 5/5 throughout all four extremities.    Sensory  Light touch is normal in upper and lower extremities.     Coordination  Right: Finger-to-nose normal. Heel-to-shin normal.Left: Finger-to-nose normal. Heel-to-shin normal.    Gait  Casual gait is normal including stance, stride, and arm swing.       NIH Stroke Scale    1a  Level of consciousness: 0=alert; keenly responsive   1b. LOC questions:  0=Answers both questions correctly    1c. LOC commands: 0=Performs both tasks correctly   2.  Best Gaze: 0=normal   3. Visual: 0=No visual loss   4. Facial Palsy: 0=Normal symmetric movement   5a. Motor left arm: 0=No drift, limb holds 90 (or 45) degrees for full 10 seconds   5b.  Motor right arm: 0=No drift, limb holds 90 (or 45) degrees for full 10 seconds   6a. Motor left le=No drift, limb holds 90 (or 45) degrees for full 10 seconds   6b  Motor right le=No drift, limb holds 90 (or 45) degrees for full 10 seconds   7. Limb Ataxia: 0=Absent   8.   Sensory: 0=Normal; no sensory loss   9. Best Language:  0=No aphasia, normal   10. Dysarthria: 0=Normal   11. Extinction and Inattention: 0=No abnormality    Total:   0     Modified Phoenix Score: 1        0  No Symptoms    1 No significant disability. Able to carry out all usual activities, despite some symptoms.    2 Slight disability. Able to look after own affairs without assistance, but unable to carry out all previous activities.    3 Moderate disability. Requires some help, but able to walk unassisted.    4 Moderately severe disability. Unable to attend to own bodily needs without assistance, and unable to walk unassisted.    5 Severe disability. Requires constant nursing care and attention, bedridden, incontinent.    6 Dead        PHQ-9 Depression Screening  Little interest or pleasure in doing things? Not at all   Feeling down, depressed, or hopeless? Not at all   PHQ-2 Total Score 0   Trouble falling or staying asleep, or sleeping too much?     Feeling tired or having little energy?     Poor appetite or overeating?     Feeling bad about yourself - or that you are a failure or have let yourself or your family down?     Trouble concentrating on things, such as reading the newspaper or watching television?     Moving or speaking so slowly that other people could have noticed? Or the opposite - being so fidgety or restless that you have been moving around a lot more than usual?     Thoughts that you would be better off dead, or of hurting yourself in some way?     PHQ-9 Total Score     If you checked off any problems, how difficult have these problems made it for you to do your work, take care of things at home, or get along with other people?            Imaging Reviewed:  Outside hospital imaging not available for direct review    CT angiogram on 9/13/2024 with distal right M2 sylvian branch occlusion.    MRI demonstrated scattered right MCA infarct.    TTE showed ejection fraction of 30 to 35% with multiple  left atrial thrombi.   Repeat CT head with hypodensities of right MCA territory and minimal petechial hemorrhage.     CT Angiogram Chest  Result Date: 5/27/2025  1. Cardiac enlargement without pericardial effusion. 2. No anomalous pulmonary venous return. 3. Left atrial appendage well-opacified without significant thrombus burden. 4. Esophagus traverses the chest posterior to the left atrium just left of midline. 5. Changes of chronic mucous plugging and/or aspiration noted in the right lower lobe bronchi. Electronically Signed: Reggie George MD  5/27/2025 11:06 AM EDT  Workstation ID: [id number]    CT Angiogram Coronary  Result Date: 3/23/2025  1.  LAD calcium score of 192. The LAD proximally shows probably greater than 75% stenosis of the vessel secondary to calcific plaque. 2.  The RCA shows some mid level motion artifact. 3.  The circumflex demonstrates mild midlevel calcification but not well evaluated in some regions due to motion.  ASSESSMENT:   CAD RADS 4 consider ICA or functional assessment. P-3  This report was finalized on 3/23/2025 2:22 PM by Dr. Iain Ng MD.        Laboratory Results:   WBC   Date Value Ref Range Status   05/27/2025 9.24 3.40 - 10.80 10*3/mm3 Final     RBC   Date Value Ref Range Status   05/27/2025 6.58 (H) 4.14 - 5.80 10*6/mm3 Final     Hemoglobin   Date Value Ref Range Status   05/27/2025 18.4 (H) 13.0 - 17.7 g/dL Final     Hematocrit   Date Value Ref Range Status   05/27/2025 56.9 (H) 37.5 - 51.0 % Final     MCV   Date Value Ref Range Status   05/27/2025 86.5 79.0 - 97.0 fL Final     MCH   Date Value Ref Range Status   05/27/2025 28.0 26.6 - 33.0 pg Final     MCHC   Date Value Ref Range Status   05/27/2025 32.3 31.5 - 35.7 g/dL Final     RDW   Date Value Ref Range Status   05/27/2025 15.7 (H) 12.3 - 15.4 % Final     RDW-SD   Date Value Ref Range Status   05/27/2025 45.7 37.0 - 54.0 fl Final     MPV   Date Value Ref Range Status   05/27/2025 10.5 6.0 - 12.0 fL Final     Platelets    Date Value Ref Range Status   05/27/2025 264 140 - 450 10*3/mm3 Final     Neutrophil %   Date Value Ref Range Status   05/06/2025 70.8 42.7 - 76.0 % Final     Lymphocyte %   Date Value Ref Range Status   05/06/2025 21.1 19.6 - 45.3 % Final     Monocyte %   Date Value Ref Range Status   05/06/2025 6.4 5.0 - 12.0 % Final     Eosinophil %   Date Value Ref Range Status   05/06/2025 0.7 0.3 - 6.2 % Final     Basophil %   Date Value Ref Range Status   05/06/2025 0.6 0.0 - 1.5 % Final     Immature Grans %   Date Value Ref Range Status   05/06/2025 0.4 0.0 - 0.5 % Final     Neutrophils Absolute   Date Value Ref Range Status   09/13/2024 8 (H) 1.7 - 6.0 x10(3)/ul Final     Neutrophils, Absolute   Date Value Ref Range Status   05/06/2025 9.58 (H) 1.70 - 7.00 10*3/mm3 Final     Lymphocytes, Absolute   Date Value Ref Range Status   05/06/2025 2.85 0.70 - 3.10 10*3/mm3 Final     Monocytes, Absolute   Date Value Ref Range Status   05/06/2025 0.86 0.10 - 0.90 10*3/mm3 Final     Eosinophils Absolute   Date Value Ref Range Status   09/13/2024 0 0.0 - 0.6 x10(3)/ul Final     Eosinophils, Absolute   Date Value Ref Range Status   05/06/2025 0.10 0.00 - 0.40 10*3/mm3 Final     Basophils Absolute   Date Value Ref Range Status   09/13/2024 0.1 0.0 - 0.3 x10(3)/ul Final     Basophils, Absolute   Date Value Ref Range Status   05/06/2025 0.08 0.00 - 0.20 10*3/mm3 Final     Immature Grans, Absolute   Date Value Ref Range Status   05/06/2025 0.06 (H) 0.00 - 0.05 10*3/mm3 Final     nRBC   Date Value Ref Range Status   05/06/2025 0.0 0.0 - 0.2 /100 WBC Final       Lipid Panel          2/7/2025    11:09   Lipid Panel   Total Cholesterol 145    Triglycerides 204    HDL Cholesterol 37    VLDL Cholesterol 34    LDL Cholesterol  74    LDL/HDL Ratio 1.82       Most Recent A1C          5/6/2025    11:21   HGBA1C Most Recent   Hemoglobin A1C 6.00       Lab Results   Component Value Date    GLUCOSE 126 (H) 05/27/2025    BUN 16 05/27/2025    CREATININE  1.16 05/27/2025     05/27/2025    K 4.7 05/27/2025     05/27/2025    CALCIUM 9.4 05/27/2025    PROTEINTOT 7.0 05/06/2025    ALBUMIN 4.2 05/06/2025    ALT 45 (H) 05/06/2025    AST 27 05/06/2025    ALKPHOS 140 (H) 05/06/2025    BILITOT 0.6 05/06/2025    GLOB 2.8 05/06/2025    AGRATIO 1.5 05/06/2025    BCR 13.8 05/27/2025    ANIONGAP 9.0 05/27/2025    EGFR 68.2 05/27/2025          Assessment / Plan      Assessment/Plan:     # History of CVA (scattered right MCA territory infarct 9/13/2024)  # A-fib  # CAD stenosis  - Etiology felt to be cardioembolic with new onset A-fib with RVR and TTE with left atrial thrombi  - Continue Eliquis 5 mg twice daily  - Continue Plavix 75 mg daily per cardiology recommendations  - Patient currently scheduled for A-fib ablation on 5/29 with Dr. Duke  - Mediterranean diet for secondary stroke prevention encouraged and patient to supplement caloric intake with boost drinks for ongoing weight loss  - May increase activity as tolerated  - Signs and symptoms of stroke reviewed with patient, call 911 for any new symptoms  - Follow-up in neurology stroke clinic as scheduled    # Hyperlipidemia  - Most recent LDL on 2/7/2025 equal 72.  Goal LDL less than 70  - Continue Lipitor 40 mg nightly  - Follow with PCP for routine monitoring    # Hypotension  - Patient BP today equal 86/55.  Goal blood pressures with systolic above 90 and diastolic above 60.  High blood pressure goals less than 130/80 given CAD stenosis  - Recommend blood pressure log book at home  - Encourage patient to adequately hydrate with at least 72 to 80 ounces of water daily  - Patient to follow-up with cardiology concerning hypotension and medication adjustments.     # Tobacco abuse  - Currently only 2 cigarettes daily  - Cessation counseling and education provided.  - No nicotine patch warranted given current use.  Patient motivated to quit    # Unintentional weight loss  - Patient has lost approximately 40 pounds  in the past 6 to 7 months  - PCP has encouraged follow-up with colonoscopy however patient has refused  - Encouraged caloric supplementation with boost drinks.  If weight loss continues will insist on follow-up with gastroenterology for colonoscopy scheduling    Discussed the importance of medication compliance and lifestyle modifications (adequate blood pressure control, adequate control of hyperlipidemia, adequate glycemic control, increase physical activity, and healthy diet) to help reduce the risk of future cerebrovascular events.  Also discussed the signs symptoms that would warrant the patient return back to the emergency department including unilateral weakness, unilateral numbness, visual disturbances, loss of balance, speech difficulties, and/or a sudden severe headache.  Patient verbalized understanding    Follow Up:   Return in about 3 months (around 8/16/2025).    Sven Garcia PA-C  [facility] Neuro Stroke

## 2025-06-26 ENCOUNTER — DISEASE STATE MANAGEMENT VISIT (OUTPATIENT)
Dept: PHARMACY | Facility: HOSPITAL | Age: 70
End: 2025-06-26
Payer: MEDICARE

## 2025-06-26 RX ORDER — SACUBITRIL AND VALSARTAN 24; 26 MG/1; MG/1
1 TABLET, FILM COATED ORAL 2 TIMES DAILY
Qty: 180 TABLET | Refills: 1 | Status: SHIPPED | OUTPATIENT
Start: 2025-06-26 | End: 2025-12-23

## 2025-07-01 ENCOUNTER — OFFICE VISIT (OUTPATIENT)
Dept: CARDIOLOGY | Facility: HOSPITAL | Age: 70
End: 2025-07-01
Payer: MEDICARE

## 2025-07-01 VITALS
WEIGHT: 206.2 LBS | HEIGHT: 78 IN | OXYGEN SATURATION: 95 % | DIASTOLIC BLOOD PRESSURE: 55 MMHG | RESPIRATION RATE: 18 BRPM | SYSTOLIC BLOOD PRESSURE: 89 MMHG | HEART RATE: 84 BPM | BODY MASS INDEX: 23.86 KG/M2

## 2025-07-01 DIAGNOSIS — I50.22 CHRONIC HFREF (HEART FAILURE WITH REDUCED EJECTION FRACTION): ICD-10-CM

## 2025-07-01 DIAGNOSIS — I48.19 ATRIAL FIBRILLATION, PERSISTENT: Primary | ICD-10-CM

## 2025-07-01 NOTE — PROGRESS NOTES
"Chief Complaint  Post PVA    Subjective      History of Present Illness {CC  Problem List  Visit  Diagnosis   Encounters  Notes  Medications  Labs  Result Review Imaging  Media :23}     Anshlu Andrews, 70 y.o. male presents to [facility] Atrial Fibrillation/arrhythmia clinic for Post PVA    Problem List:  Persistent atrial fibrillation  SHARYN 9/2024: LVEF 30%, moderate to severe global hypokinesis of the LV, severe LA enlargement, mobile NIMA thrombi  CVA  [facility] admission 9-10/24 embolic stroke to right MCA s/p TNK and thrombectomy, [facility]   HFrEF  SHARYN 9/2024: LVEF 30%  TTE 12/2024: LVEF 31-35%, no changes from prior SHARYN  Coronary CTA pending  Diabetes  Hypertension  Polycythemia  Tobacco use     HPI:   Patient recently underwent successful PFA of pulmonary veins, LA roof/posterior atrium, CTI AFL by Dr. Duke on 5/29/2025. Patient was instructed to continue uninterrupted anticoagulation.      Patient presents today doing well since their procedure.  States no recent AF symptoms since ablation. Denies access site complications, or signs/symptoms of bleeding. They have continued uninterrupted anticoagulation as instructed with no bleeding complications. SBP generally 110 while seated at home. Orthostatic BP decrease today.      Objective     Vital Signs:   Vitals:    07/01/25 1325 07/01/25 1326 07/01/25 1355   BP: 120/69 (!) 89/55    BP Location: Left arm Left arm    Patient Position: Sitting Standing    Cuff Size: Adult Adult    Pulse:  95 84   Resp: 18     SpO2:  95%    Weight: 93.5 kg (206 lb 3.2 oz)     Height: 198.1 cm (78\")       Body mass index is 23.83 kg/m².  Physical Exam  Vitals and nursing note reviewed.   Constitutional:       Appearance: Normal appearance.   HENT:      Head: Normocephalic.   Eyes:      Extraocular Movements: Extraocular movements intact.   Neck:      Vascular: No carotid bruit.   Cardiovascular:      Rate and Rhythm: Normal rate and regular " rhythm.      Pulses: Normal pulses.      Heart sounds: Normal heart sounds, S1 normal and S2 normal. No murmur heard.  Pulmonary:      Effort: Pulmonary effort is normal. No respiratory distress.      Breath sounds: Normal breath sounds.   Musculoskeletal:      Cervical back: Neck supple.      Right lower leg: No edema.      Left lower leg: No edema.   Skin:     General: Skin is warm and dry.   Neurological:      General: No focal deficit present.      Mental Status: He is alert.   Psychiatric:         Mood and Affect: Mood normal.         Behavior: Behavior normal.         Thought Content: Thought content normal.        Data Reviewed:{ Labs  Result Review  Imaging  Med Tab  Media :23}     EP/CRM Study (05/29/2025 10:18)   Adult Transthoracic Echo Limited W/ Cont if Necessary Per Protocol (05/29/2025 11:12)   Cardiac Catheterization/Vascular Study (04/18/2025 09:29)     Lab Results   Component Value Date    GLUCOSE 126 (H) 05/27/2025    CALCIUM 9.4 05/27/2025     05/27/2025    K 4.7 05/27/2025    CO2 26.0 05/27/2025     05/27/2025    BUN 16 05/27/2025    CREATININE 1.16 05/27/2025    EGFR 68.2 05/27/2025    BCR 13.8 05/27/2025    ANIONGAP 9.0 05/27/2025     Lab Results   Component Value Date    TSH 1.770 12/13/2024     Lab Results   Component Value Date    WBC 9.24 05/27/2025    HGB 18.4 (H) 05/27/2025    HCT 56.9 (H) 05/27/2025    MCV 86.5 05/27/2025     05/27/2025       Assessment & Plan   Assessment and Plan {CC Problem List  Visit Diagnosis  ROS  Review (Popup)  Health Maintenance  Quality  BestPractice  Medications  SmartSets  SnapShot Encounters  Media :23}     1. Atrial fibrillation, persistent  -s/p successful PFA of pulmonary veins, LA roof/posterior atrium, CTI AFL by Dr. Duke on 5/29/2025.  -No recurrent arrhythmia symptoms since recent procedure  -Regular rate/rhythm at time of exam today.  -[id number]: 6  -Continue anticoagulation with apixaban 5 Mg every 12 hours    -Continue Toprol-XL 50 Mg nightly as prescribed for GDMT  -Discussed modifiable risk factors including: Currently well-controlled, continue HF clinic follow-up locally in [place]  -AF education provided in clinic today.  -Continue routine EP follow-up as scheduled     2. Chronic HFrEF (heart failure with reduced ejection fraction)/NICM  -TTE 5/29/25 with LVEF 36-40%  -Appears well compensated, euvolemic on exam  -Continue current GDMT with Toprol-XL, Entresto, Jardiance as prescribed  - Orthostatic BP decrease in clinic today.  Reports he does not drink much water.  Recommended ensuring he is maintaining adequate hydration of 1-2 L of hydration daily  -Continue [place] HF clinic follow-up next week as scheduled      Follow Up {Instructions Charge Capture  Follow-up Communications :23}     Return if symptoms worsen or fail to improve.      Patient was given instructions and counseling regarding his condition or for health maintenance advice. Please see specific information pulled into the AVS if appropriate. Patient was instructed to call the [facility] with any questions, concerns, or worsening symptoms.    Dictated Utilizing Dragon Dictation   Please note that portions of this note were completed with a voice recognition program. Part of this note may be an electronic transcription/translation of spoken language to printed text using the Dragon Dictation System.

## 2025-07-07 ENCOUNTER — HOSPITAL ENCOUNTER (OUTPATIENT)
Facility: HOSPITAL | Age: 70
Setting detail: OBSERVATION
Discharge: HOME | End: 2025-07-08
Admitting: INTERNAL MEDICINE
Payer: MEDICARE

## 2025-07-07 ENCOUNTER — HOSPITAL ENCOUNTER (OUTPATIENT)
Dept: CARDIOLOGY | Facility: HOSPITAL | Age: 70
Discharge: HOME | End: 2025-07-07
Payer: MEDICARE

## 2025-07-07 ENCOUNTER — APPOINTMENT (OUTPATIENT)
Dept: CT IMAGING | Facility: HOSPITAL | Age: 70
End: 2025-07-07
Payer: MEDICARE

## 2025-07-07 ENCOUNTER — APPOINTMENT (OUTPATIENT)
Dept: GENERAL RADIOLOGY | Facility: HOSPITAL | Age: 70
End: 2025-07-07
Payer: MEDICARE

## 2025-07-07 VITALS
HEART RATE: 75 BPM | HEIGHT: 78 IN | SYSTOLIC BLOOD PRESSURE: 93 MMHG | DIASTOLIC BLOOD PRESSURE: 64 MMHG | WEIGHT: 209 LBS | BODY MASS INDEX: 24.18 KG/M2 | OXYGEN SATURATION: 96 %

## 2025-07-07 DIAGNOSIS — R41.82 ALTERED MENTAL STATUS, UNSPECIFIED ALTERED MENTAL STATUS TYPE: ICD-10-CM

## 2025-07-07 DIAGNOSIS — I50.22 CHRONIC HFREF (HEART FAILURE WITH REDUCED EJECTION FRACTION): ICD-10-CM

## 2025-07-07 DIAGNOSIS — I48.19 ATRIAL FIBRILLATION, PERSISTENT: ICD-10-CM

## 2025-07-07 DIAGNOSIS — I95.9 HYPOTENSION, UNSPECIFIED HYPOTENSION TYPE: Primary | ICD-10-CM

## 2025-07-07 DIAGNOSIS — I10 ESSENTIAL HYPERTENSION: ICD-10-CM

## 2025-07-07 DIAGNOSIS — I50.42 CHRONIC COMBINED SYSTOLIC (CONGESTIVE) AND DIASTOLIC (CONGESTIVE) HEART FAILURE: Primary | ICD-10-CM

## 2025-07-07 DIAGNOSIS — I42.8 NICM (NONISCHEMIC CARDIOMYOPATHY): ICD-10-CM

## 2025-07-07 LAB
ABSOLUTE LUNG FLUID CONTENT: 26 % (ref 20–35)
ALBUMIN SERPL-MCNC: 4.1 G/DL (ref 3.5–5.2)
ALBUMIN/GLOB SERPL: 1.5 G/DL
ALP SERPL-CCNC: 127 U/L (ref 39–117)
ALT SERPL W P-5'-P-CCNC: 34 U/L (ref 1–41)
ANION GAP SERPL CALCULATED.3IONS-SCNC: 10.9 MMOL/L (ref 5–15)
ANION GAP SERPL CALCULATED.3IONS-SCNC: 11.1 MMOL/L (ref 5–15)
APTT PPP: 31.7 SECONDS (ref 24.5–35.9)
AST SERPL-CCNC: 26 U/L (ref 1–40)
BACTERIA UR QL AUTO: ABNORMAL /HPF
BASOPHILS # BLD AUTO: 0.1 10*3/MM3 (ref 0–0.2)
BASOPHILS NFR BLD AUTO: 0.7 % (ref 0–1.5)
BILIRUB SERPL-MCNC: 0.7 MG/DL (ref 0–1.2)
BILIRUB UR QL STRIP: NEGATIVE
BUN SERPL-MCNC: 17.5 MG/DL (ref 8–23)
BUN SERPL-MCNC: 17.5 MG/DL (ref 8–23)
BUN/CREAT SERPL: 16.8 (ref 7–25)
BUN/CREAT SERPL: 17 (ref 7–25)
CALCIUM SPEC-SCNC: 9.5 MG/DL (ref 8.6–10.5)
CALCIUM SPEC-SCNC: 9.8 MG/DL (ref 8.6–10.5)
CHLORIDE SERPL-SCNC: 104 MMOL/L (ref 98–107)
CHLORIDE SERPL-SCNC: 105 MMOL/L (ref 98–107)
CLARITY UR: CLEAR
CO2 SERPL-SCNC: 24.9 MMOL/L (ref 22–29)
CO2 SERPL-SCNC: 25.1 MMOL/L (ref 22–29)
COLOR UR: ABNORMAL
CREAT SERPL-MCNC: 1.03 MG/DL (ref 0.76–1.27)
CREAT SERPL-MCNC: 1.04 MG/DL (ref 0.76–1.27)
D-LACTATE SERPL-SCNC: 2 MMOL/L (ref 0.5–2)
DEPRECATED RDW RBC AUTO: 50.9 FL (ref 37–54)
DEPRECATED RDW RBC AUTO: 51.2 FL (ref 37–54)
EGFRCR SERPLBLD CKD-EPI 2021: 77.7 ML/MIN/1.73
EGFRCR SERPLBLD CKD-EPI 2021: 78.6 ML/MIN/1.73
EOSINOPHIL # BLD AUTO: 0.17 10*3/MM3 (ref 0–0.4)
EOSINOPHIL NFR BLD AUTO: 1.1 % (ref 0.3–6.2)
ERYTHROCYTE [DISTWIDTH] IN BLOOD BY AUTOMATED COUNT: 16 % (ref 12.3–15.4)
ERYTHROCYTE [DISTWIDTH] IN BLOOD BY AUTOMATED COUNT: 16.7 % (ref 12.3–15.4)
FERRITIN SERPL-MCNC: 465 NG/ML (ref 30–400)
GEN 5 1HR TROPONIN T REFLEX: 18 NG/L
GLOBULIN UR ELPH-MCNC: 2.7 GM/DL
GLUCOSE BLDC GLUCOMTR-MCNC: 105 MG/DL (ref 70–130)
GLUCOSE BLDC GLUCOMTR-MCNC: 146 MG/DL (ref 70–130)
GLUCOSE SERPL-MCNC: 106 MG/DL (ref 65–99)
GLUCOSE SERPL-MCNC: 108 MG/DL (ref 65–99)
GLUCOSE UR STRIP-MCNC: ABNORMAL MG/DL
HCT VFR BLD AUTO: 52.3 % (ref 37.5–51)
HCT VFR BLD AUTO: 52.3 % (ref 37.5–51)
HGB BLD-MCNC: 16.5 G/DL (ref 13–17.7)
HGB BLD-MCNC: 16.7 G/DL (ref 13–17.7)
HGB UR QL STRIP.AUTO: ABNORMAL
HOLD SPECIMEN: NORMAL
HOLD SPECIMEN: NORMAL
HYALINE CASTS UR QL AUTO: ABNORMAL /LPF
IMM GRANULOCYTES # BLD AUTO: 0.07 10*3/MM3 (ref 0–0.05)
IMM GRANULOCYTES NFR BLD AUTO: 0.5 % (ref 0–0.5)
INR PPP: 1.27 (ref 0.9–1.1)
IRON 24H UR-MRATE: 89 MCG/DL (ref 59–158)
IRON SATN MFR SERPL: 24 % (ref 20–50)
KETONES UR QL STRIP: NEGATIVE
LEUKOCYTE ESTERASE UR QL STRIP.AUTO: ABNORMAL
LYMPHOCYTES # BLD AUTO: 3.55 10*3/MM3 (ref 0.7–3.1)
LYMPHOCYTES NFR BLD AUTO: 23.8 % (ref 19.6–45.3)
MAGNESIUM SERPL-MCNC: 2.4 MG/DL (ref 1.6–2.4)
MCH RBC QN AUTO: 27.6 PG (ref 26.6–33)
MCH RBC QN AUTO: 28 PG (ref 26.6–33)
MCHC RBC AUTO-ENTMCNC: 31.5 G/DL (ref 31.5–35.7)
MCHC RBC AUTO-ENTMCNC: 31.9 G/DL (ref 31.5–35.7)
MCV RBC AUTO: 87.6 FL (ref 79–97)
MCV RBC AUTO: 87.6 FL (ref 79–97)
MONOCYTES # BLD AUTO: 0.99 10*3/MM3 (ref 0.1–0.9)
MONOCYTES NFR BLD AUTO: 6.6 % (ref 5–12)
NEUTROPHILS NFR BLD AUTO: 10.06 10*3/MM3 (ref 1.7–7)
NEUTROPHILS NFR BLD AUTO: 67.3 % (ref 42.7–76)
NITRITE UR QL STRIP: NEGATIVE
NRBC BLD AUTO-RTO: 0 /100 WBC (ref 0–0.2)
NT-PROBNP SERPL-MCNC: 187 PG/ML (ref 0–900)
NT-PROBNP SERPL-MCNC: 203 PG/ML (ref 0–900)
PH UR STRIP.AUTO: 6 [PH] (ref 5–8)
PLATELET # BLD AUTO: 276 10*3/MM3 (ref 140–450)
PLATELET # BLD AUTO: 280 10*3/MM3 (ref 140–450)
PMV BLD AUTO: 10.1 FL (ref 6–12)
PMV BLD AUTO: 10.5 FL (ref 6–12)
POTASSIUM SERPL-SCNC: 4.7 MMOL/L (ref 3.5–5.2)
POTASSIUM SERPL-SCNC: 4.7 MMOL/L (ref 3.5–5.2)
PROT SERPL-MCNC: 6.8 G/DL (ref 6–8.5)
PROT UR QL STRIP: ABNORMAL
PROTHROMBIN TIME: 16.7 SECONDS (ref 12.5–15.2)
RBC # BLD AUTO: 5.97 10*6/MM3 (ref 4.14–5.8)
RBC # BLD AUTO: 5.97 10*6/MM3 (ref 4.14–5.8)
RBC # UR STRIP: ABNORMAL /HPF
REF LAB TEST METHOD: ABNORMAL
SODIUM SERPL-SCNC: 140 MMOL/L (ref 136–145)
SODIUM SERPL-SCNC: 141 MMOL/L (ref 136–145)
SP GR UR STRIP: >1.03 (ref 1–1.03)
SQUAMOUS #/AREA URNS HPF: ABNORMAL /HPF
TIBC SERPL-MCNC: 377 MCG/DL (ref 298–536)
TRANSFERRIN SERPL-MCNC: 253 MG/DL (ref 200–360)
TROPONIN T % DELTA: -25
TROPONIN T NUMERIC DELTA: -6 NG/L
TROPONIN T SERPL HS-MCNC: 24 NG/L
UROBILINOGEN UR QL STRIP: ABNORMAL
WBC # UR STRIP: ABNORMAL /HPF
WBC NRBC COR # BLD AUTO: 12.86 10*3/MM3 (ref 3.4–10.8)
WBC NRBC COR # BLD AUTO: 14.94 10*3/MM3 (ref 3.4–10.8)
WHOLE BLOOD HOLD COAG: NORMAL
WHOLE BLOOD HOLD SPECIMEN: NORMAL

## 2025-07-07 PROCEDURE — G0378 HOSPITAL OBSERVATION PER HR: HCPCS

## 2025-07-07 PROCEDURE — 85730 THROMBOPLASTIN TIME PARTIAL: CPT

## 2025-07-07 PROCEDURE — 70496 CT ANGIOGRAPHY HEAD: CPT | Performed by: RADIOLOGY

## 2025-07-07 PROCEDURE — 94726 PLETHYSMOGRAPHY LUNG VOLUMES: CPT | Performed by: PHYSICIAN ASSISTANT

## 2025-07-07 PROCEDURE — 25810000003 SODIUM CHLORIDE 0.9 % SOLUTION

## 2025-07-07 PROCEDURE — 83735 ASSAY OF MAGNESIUM: CPT | Performed by: PHYSICIAN ASSISTANT

## 2025-07-07 PROCEDURE — 82948 REAGENT STRIP/BLOOD GLUCOSE: CPT

## 2025-07-07 PROCEDURE — P9612 CATHETERIZE FOR URINE SPEC: HCPCS

## 2025-07-07 PROCEDURE — 70498 CT ANGIOGRAPHY NECK: CPT | Performed by: RADIOLOGY

## 2025-07-07 PROCEDURE — 93010 ELECTROCARDIOGRAM REPORT: CPT | Performed by: INTERNAL MEDICINE

## 2025-07-07 PROCEDURE — 25510000001 IOPAMIDOL PER 1 ML

## 2025-07-07 PROCEDURE — 36415 COLL VENOUS BLD VENIPUNCTURE: CPT | Performed by: INTERNAL MEDICINE

## 2025-07-07 PROCEDURE — 70496 CT ANGIOGRAPHY HEAD: CPT

## 2025-07-07 PROCEDURE — 71045 X-RAY EXAM CHEST 1 VIEW: CPT

## 2025-07-07 PROCEDURE — 94799 UNLISTED PULMONARY SVC/PX: CPT

## 2025-07-07 PROCEDURE — 70450 CT HEAD/BRAIN W/O DYE: CPT

## 2025-07-07 PROCEDURE — 70450 CT HEAD/BRAIN W/O DYE: CPT | Performed by: RADIOLOGY

## 2025-07-07 PROCEDURE — 71045 X-RAY EXAM CHEST 1 VIEW: CPT | Performed by: RADIOLOGY

## 2025-07-07 PROCEDURE — 85027 COMPLETE CBC AUTOMATED: CPT | Performed by: PHYSICIAN ASSISTANT

## 2025-07-07 PROCEDURE — 83880 ASSAY OF NATRIURETIC PEPTIDE: CPT

## 2025-07-07 PROCEDURE — 82728 ASSAY OF FERRITIN: CPT | Performed by: PHYSICIAN ASSISTANT

## 2025-07-07 PROCEDURE — 84484 ASSAY OF TROPONIN QUANT: CPT

## 2025-07-07 PROCEDURE — 83540 ASSAY OF IRON: CPT | Performed by: PHYSICIAN ASSISTANT

## 2025-07-07 PROCEDURE — 70498 CT ANGIOGRAPHY NECK: CPT

## 2025-07-07 PROCEDURE — 99285 EMERGENCY DEPT VISIT HI MDM: CPT

## 2025-07-07 PROCEDURE — 83605 ASSAY OF LACTIC ACID: CPT | Performed by: INTERNAL MEDICINE

## 2025-07-07 PROCEDURE — 85025 COMPLETE CBC W/AUTO DIFF WBC: CPT

## 2025-07-07 PROCEDURE — 83880 ASSAY OF NATRIURETIC PEPTIDE: CPT | Performed by: PHYSICIAN ASSISTANT

## 2025-07-07 PROCEDURE — 94761 N-INVAS EAR/PLS OXIMETRY MLT: CPT

## 2025-07-07 PROCEDURE — 93005 ELECTROCARDIOGRAM TRACING: CPT

## 2025-07-07 PROCEDURE — 99223 1ST HOSP IP/OBS HIGH 75: CPT | Performed by: INTERNAL MEDICINE

## 2025-07-07 PROCEDURE — 80053 COMPREHEN METABOLIC PANEL: CPT | Performed by: PHYSICIAN ASSISTANT

## 2025-07-07 PROCEDURE — 36415 COLL VENOUS BLD VENIPUNCTURE: CPT | Performed by: PHYSICIAN ASSISTANT

## 2025-07-07 PROCEDURE — 84466 ASSAY OF TRANSFERRIN: CPT | Performed by: PHYSICIAN ASSISTANT

## 2025-07-07 PROCEDURE — 96360 HYDRATION IV INFUSION INIT: CPT

## 2025-07-07 PROCEDURE — 85610 PROTHROMBIN TIME: CPT

## 2025-07-07 PROCEDURE — 81001 URINALYSIS AUTO W/SCOPE: CPT

## 2025-07-07 PROCEDURE — 94618 PULMONARY STRESS TESTING: CPT | Performed by: PHYSICIAN ASSISTANT

## 2025-07-07 RX ORDER — METOPROLOL SUCCINATE 25 MG/1
25 TABLET, EXTENDED RELEASE ORAL NIGHTLY
Status: DISCONTINUED | OUTPATIENT
Start: 2025-07-08 | End: 2025-07-08 | Stop reason: HOSPADM

## 2025-07-07 RX ORDER — AMOXICILLIN 250 MG
2 CAPSULE ORAL 2 TIMES DAILY PRN
Status: DISCONTINUED | OUTPATIENT
Start: 2025-07-07 | End: 2025-07-08 | Stop reason: HOSPADM

## 2025-07-07 RX ORDER — VITAMIN B COMPLEX
1 CAPSULE ORAL DAILY
COMMUNITY

## 2025-07-07 RX ORDER — BISACODYL 10 MG
10 SUPPOSITORY, RECTAL RECTAL DAILY PRN
Status: DISCONTINUED | OUTPATIENT
Start: 2025-07-07 | End: 2025-07-08 | Stop reason: HOSPADM

## 2025-07-07 RX ORDER — SODIUM CHLORIDE 0.9 % (FLUSH) 0.9 %
10 SYRINGE (ML) INJECTION AS NEEDED
Status: DISCONTINUED | OUTPATIENT
Start: 2025-07-07 | End: 2025-07-08 | Stop reason: HOSPADM

## 2025-07-07 RX ORDER — IOPAMIDOL 755 MG/ML
100 INJECTION, SOLUTION INTRAVASCULAR
Status: COMPLETED | OUTPATIENT
Start: 2025-07-07 | End: 2025-07-07

## 2025-07-07 RX ORDER — SODIUM CHLORIDE 9 MG/ML
40 INJECTION, SOLUTION INTRAVENOUS AS NEEDED
Status: DISCONTINUED | OUTPATIENT
Start: 2025-07-07 | End: 2025-07-08 | Stop reason: HOSPADM

## 2025-07-07 RX ORDER — FUROSEMIDE 20 MG/1
20 TABLET ORAL DAILY PRN
Status: DISCONTINUED | OUTPATIENT
Start: 2025-07-07 | End: 2025-07-08 | Stop reason: HOSPADM

## 2025-07-07 RX ORDER — ALBUTEROL SULFATE 0.83 MG/ML
2.5 SOLUTION RESPIRATORY (INHALATION) EVERY 4 HOURS PRN
Status: DISCONTINUED | OUTPATIENT
Start: 2025-07-07 | End: 2025-07-08 | Stop reason: HOSPADM

## 2025-07-07 RX ORDER — SODIUM CHLORIDE 0.9 % (FLUSH) 0.9 %
10 SYRINGE (ML) INJECTION EVERY 12 HOURS SCHEDULED
Status: DISCONTINUED | OUTPATIENT
Start: 2025-07-07 | End: 2025-07-08 | Stop reason: HOSPADM

## 2025-07-07 RX ORDER — ARFORMOTEROL TARTRATE 15 UG/2ML
15 SOLUTION RESPIRATORY (INHALATION)
Status: DISCONTINUED | OUTPATIENT
Start: 2025-07-08 | End: 2025-07-08 | Stop reason: HOSPADM

## 2025-07-07 RX ORDER — ATORVASTATIN CALCIUM 40 MG/1
80 TABLET, FILM COATED ORAL DAILY
Status: DISCONTINUED | OUTPATIENT
Start: 2025-07-07 | End: 2025-07-08 | Stop reason: HOSPADM

## 2025-07-07 RX ORDER — BUDESONIDE 0.5 MG/2ML
0.5 INHALANT ORAL
Status: DISCONTINUED | OUTPATIENT
Start: 2025-07-08 | End: 2025-07-08 | Stop reason: HOSPADM

## 2025-07-07 RX ORDER — POLYETHYLENE GLYCOL 3350 17 G/17G
17 POWDER, FOR SOLUTION ORAL DAILY PRN
Status: DISCONTINUED | OUTPATIENT
Start: 2025-07-07 | End: 2025-07-08 | Stop reason: HOSPADM

## 2025-07-07 RX ORDER — NITROGLYCERIN 0.4 MG/1
0.4 TABLET SUBLINGUAL
Status: DISCONTINUED | OUTPATIENT
Start: 2025-07-07 | End: 2025-07-08 | Stop reason: HOSPADM

## 2025-07-07 RX ORDER — BISACODYL 5 MG/1
5 TABLET, DELAYED RELEASE ORAL DAILY PRN
Status: DISCONTINUED | OUTPATIENT
Start: 2025-07-07 | End: 2025-07-08 | Stop reason: HOSPADM

## 2025-07-07 RX ORDER — METOPROLOL SUCCINATE 50 MG/1
50 TABLET, EXTENDED RELEASE ORAL NIGHTLY
Status: CANCELLED | OUTPATIENT
Start: 2025-07-07

## 2025-07-07 RX ORDER — FUROSEMIDE 20 MG/1
20 TABLET ORAL DAILY PRN
Status: CANCELLED | OUTPATIENT
Start: 2025-07-07

## 2025-07-07 RX ADMIN — IOPAMIDOL 100 ML: 755 INJECTION, SOLUTION INTRAVENOUS at 10:43

## 2025-07-07 RX ADMIN — APIXABAN 5 MG: 5 TABLET, FILM COATED ORAL at 20:10

## 2025-07-07 RX ADMIN — ATORVASTATIN CALCIUM 80 MG: 40 TABLET, FILM COATED ORAL at 18:32

## 2025-07-07 RX ADMIN — SODIUM CHLORIDE 500 ML: 9 INJECTION, SOLUTION INTRAVENOUS at 10:08

## 2025-07-07 RX ADMIN — EMPAGLIFLOZIN 10 MG: 10 TABLET, FILM COATED ORAL at 18:32

## 2025-07-07 RX ADMIN — Medication 10 ML: at 20:10

## 2025-07-07 NOTE — ED NOTES
Patient made aware that we need a urine sample as soon as possible, patient has collection supplies at bedside

## 2025-07-07 NOTE — ED NOTES
Per heart failure clinic report to TIFFANIE Arnold, Pt had an acute episode where Pt could not follow commands, could not speak, takes eliquis for prior stroke in September, on arrival to ED, code stroke called. Per Dr. Stacie WHITING code stroke cancelled, states Pt only needs MRI.

## 2025-07-07 NOTE — PROGRESS NOTES
Heart Failure Clinic    Date: 07/07/25     Vitals:    07/07/25 0917   BP: 93/64   Pulse: 75   SpO2: 96%    [id number]    Method of arrival: Ambulatory    Weighing self daily: Most days    Monitoring Heart Failure Zones: Most days    Today's HF Zone: Yellow     Taking medications as prescribed: Yes    Edema No    Shortness of Air: No    Number of pillows used at night:<2    Educational Materials given:  avs                                                                         ReDS Value: 26  25-35 Optimal Value Status      Shikha Velasquez RN 07/07/25 09:20 EDT

## 2025-07-07 NOTE — PROGRESS NOTES
[facility] Heart Failure Clinic  JOCELYN Dacosta, Josh Benton MD  [address] [place] [address]    Thank you for asking me to see Anshul Andrews for congestive heart failure.    HPI:     This is a 70 y.o. male with known past medical history of:    HFrEF  Nonischemic cardiomyopathy  SHARYN from 09/17/24 with EF around 30%; severe left atrial enlargement. Severe global hypokinetic left atrial appendage  TTE from 12/24/24 with EF 31-35%  SHARYN from 03/17/25 with EF 36-40% and with no known evidence of left atrial appendage thrombus present.   TTE from 05/29/25 with EF 36-40%.  RV cavity dilated with borderline systolic fxn.    Persistent atrial fibrillation  PFA of pulmonary veins, LA roof/posterior atrium, CTI AFL by Dr. Duke on 05/29/25  Right MCA stroke s/p thrombectomy @ [facility] in September 2024 felt to likely be cardioembolic at time of event per chart review  Nonobstructive CAD:   LHC performed by Dr. Nunes from 04/18/2025 with nonobstructive disease of the mid LAD by IFR.    DM   Essential HTN  Polycythemia  Hx of cardiac thrombi   Tobacco abuse    Anshul Andrews presents for today for Heart Failure clinic evaluation.  The patient is typically seen by Rosaura Guzman APRN.  Patient's primary cardiologist is Dr. Martines.     Last known hospitalization and/or ED visit: Patient was initially hospitalized at the [facility] with right MCA stroke during which time he was found to have new onset atrial fibrillation as well as HFrEF with ejection fraction around 30%  ED visit in December 2024 on the 13th with afib with note reviewed and dc home from ED.   Accompanied by: Daughter        07/07/2025  visit data/details regarding:     Mr. Andrews was being evaluated in clinic for his regularly scheduled HF follow-up.  He was first evaluated by our RN, MA, and then pharmacist.  He was alert and oriented during these evaluations and following commands.  Shortly  afterward, his daughter called me to his room and reported acute mental status change.  Patient was slumped to the left side of the chair.  He initially followed commands for recheck of blood pressure; however, cuff malfunctioned.  During this period of time, he stopped responding to commands and stopping speaking.   He was unable to look at me and had bilateral weak  strength.  His daughter reports the behavior similar to when he had his last stroke.  Given concern for acute stroke with event of AMS and difficulty following commands, he was sent to the ED for further evaluation.          Review of Systems - Review of Systems   Reason unable to perform ROS: AMS.         All other systems were reviewed and were negative.    Patient Active Problem List   Diagnosis    CVA (cerebral vascular accident)    Cardiac arrhythmia    Simple chronic bronchitis    Prediabetes    Atrial fibrillation    Sleep apnea    Chronic combined systolic (congestive) and diastolic (congestive) heart failure    NSVT (nonsustained ventricular tachycardia)    SHERIFF (dyspnea on exertion)    Abnormal cardiac CT angiography    Arthritis    Essential hypertension    ASCVD (arteriosclerotic cardiovascular disease)    NICM (nonischemic cardiomyopathy)    Chronic HFrEF (heart failure with reduced ejection fraction)    Atrial fibrillation, persistent    Hypotension       family history includes Heart attack in his father and paternal uncle.     reports that he has been smoking cigarettes. He started smoking about 25 years ago. He has a 25.3 pack-year smoking history. He has been exposed to tobacco smoke. He has never used smokeless tobacco. He reports that he does not currently use alcohol. He reports current drug use. Drug: Marijuana.    No Known Allergies    No current facility-administered medications for this encounter.    Current Outpatient Medications:     albuterol sulfate  (90 Base) MCG/ACT inhaler, Inhale 2 puffs Every 4 (Four) Hours  As Needed for Wheezing., Disp: 8 g, Rfl: 2    apixaban (Eliquis) 5 MG tablet tablet, Take 1 tablet by mouth Every 12 (Twelve) Hours., Disp: 180 tablet, Rfl: 2    atorvastatin (LIPITOR) 80 MG tablet, Take 1 tablet by mouth Daily., Disp: 90 tablet, Rfl: 2    empagliflozin (JARDIANCE) 10 MG tablet tablet, Take 1 tablet by mouth Daily., Disp: 90 tablet, Rfl: 1    Fluticasone Furoate-Vilanterol (Breo Ellipta) 200-25 MCG/ACT inhaler, Inhale 1 puff Daily., Disp: 30 each, Rfl: 11    furosemide (Lasix) 20 MG tablet, Take 1 tablet by mouth Daily As Needed (Leg swelling)., Disp: 30 tablet, Rfl: 0    metoprolol succinate XL (TOPROL-XL) 100 MG 24 hr tablet, Take 0.5 tablets by mouth Every Night., Disp: , Rfl:     sacubitril-valsartan (Entresto) 24-26 MG tablet, Take 1 tablet by mouth 2 (Two) Times a Day., Disp: 180 tablet, Rfl: 1    Vericiguat 2.5 MG tablet, Take 1 tablet by mouth Daily., Disp: 30 tablet, Rfl: 1    Facility-Administered Medications Ordered in Other Encounters:     sodium chloride 0.9 % flush 10 mL, 10 mL, Intravenous, PRN, Raffy Quinones W, DO      Physical Exam:  I have reviewed the patient's current vital signs as documented in the patient's EMR.   Vitals:    07/07/25 0917   BP: 93/64   Pulse: 75   SpO2: 96%             Body mass index is 24.15 kg/m².       07/07/25 0917   Weight: 94.8 kg (209 lb)              Physical Exam  Vitals and nursing note reviewed.   Constitutional:       Appearance: He is well-developed and well-groomed. He is ill-appearing.   HENT:      Head: Normocephalic and atraumatic.   Eyes:      General: Lids are normal.      Conjunctiva/sclera:      Right eye: Right conjunctiva is not injected.      Left eye: Left conjunctiva is not injected.   Cardiovascular:      Rate and Rhythm: Normal rate. Rhythm irregularly irregular.      Heart sounds:      No systolic murmur is present.      No diastolic murmur is present.   Pulmonary:      Effort: No tachypnea or bradypnea.      Breath sounds: No  decreased breath sounds, wheezing or rhonchi.   Abdominal:      General: Bowel sounds are normal.      Palpations: Abdomen is soft.      Tenderness: There is no abdominal tenderness.   Musculoskeletal:      Right lower leg: No edema.      Left lower leg: No edema.   Neurological:      Mental Status: He is lethargic, disoriented and confused.      Motor: Weakness present.      Comments: Weakness with bilateral  strength. Unable to follow commands to lift upper extremities or move bilateral feet.  Slumped sideways in chair during evaluation.    Psychiatric:         Attention and Perception: He is inattentive.          JVP: Volume/Pulsation: Normal.        DATA REVIEWED:           ---------------------------------------------------  TTE/SHARYN:  Results for orders placed during the hospital encounter of 05/29/25    Adult Transthoracic Echo Limited W/ Cont if Necessary Per Protocol 05/29/2025  3:30 PM    Interpretation Summary    Left ventricular systolic function is moderately decreased. Left ventricular ejection fraction appears to be 36 - 40%.    The right ventricular cavity is dilated with borderline systolic function.    No pericardial effusion present.              LAST HEART CATH RESULT/IF AVAILABLE:     No results found for this or any previous visit.      -----------------------------------------------------  CXR/Imaging:   Imaging Results (Most Recent)       None            I personally reviewed and interpreted the CXR.      -----------------------------------------------------  CT:   XR Chest 1 View  Result Date: 7/7/2025  No radiographic evidence of acute cardiac or pulmonary disease.    This report was finalized on 7/7/2025 12:14 PM by Dr. Jacoby Montelongo MD.      CT Head Without Contrast  Result Date: 7/7/2025  Cerebral atrophy.  No parenchymal mass, hemorrhage, or midline shift  This report was finalized on 7/7/2025 10:48 AM by Dr. Jacoby Montelongo MD.      CT Angiogram Neck  Result Date: 7/7/2025    Plaque  in the right carotid bulb and plaque in the left carotid bulb but no significant stenosis.  This report was finalized on 7/7/2025 10:48 AM by Dr. Jacoby Montelongo MD.      CT Angiogram Head  Result Date: 7/7/2025    No acute findings in the arteries of the head/brain.  This report was finalized on 7/7/2025 10:46 AM by Dr. Jacoby Montelongo MD.        I personally reviewed the images of the CT scan.  My personal interpretation is below.      ----------------------------------------------------      --------------------------------------------------------------------------------------------------    Laboratory evaluations:    Lab Results   Component Value Date    GLUCOSE 106 (H) 07/07/2025    GLUCOSE 108 (H) 07/07/2025    BUN 17.5 07/07/2025    BUN 17.5 07/07/2025    CREATININE 1.04 07/07/2025    CREATININE 1.03 07/07/2025    BCR 16.8 07/07/2025    BCR 17.0 07/07/2025    K 4.7 07/07/2025    K 4.7 07/07/2025    CO2 25.1 07/07/2025    CO2 24.9 07/07/2025    CALCIUM 9.5 07/07/2025    CALCIUM 9.8 07/07/2025    ALBUMIN 4.1 07/07/2025    AST 26 07/07/2025    ALT 34 07/07/2025     Lab Results   Component Value Date    WBC 14.94 (H) 07/07/2025    HGB 16.5 07/07/2025    HCT 52.3 (H) 07/07/2025    MCV 87.6 07/07/2025     07/07/2025     Lab Results   Component Value Date    CHOL 145 02/07/2025    TRIG 204 (H) 02/07/2025    HDL 37 (L) 02/07/2025    LDL 74 02/07/2025     Lab Results   Component Value Date    TSH 1.770 12/13/2024     Lab Results   Component Value Date    HGBA1C 6.00 (H) 05/06/2025     Lab Results   Component Value Date    ALT 34 07/07/2025     Lab Results   Component Value Date    HGBA1C 6.00 (H) 05/06/2025    HGBA1C 5.90 (H) 02/07/2025    HGBA1C 6.20 (H) 09/25/2024     Lab Results   Component Value Date    MICROALBUR <1.2 05/06/2025    CREATININE 1.04 07/07/2025    CREATININE 1.03 07/07/2025     Lab Results   Component Value Date    IRON 89 07/07/2025    TIBC 377 07/07/2025    FERRITIN 465.00 (H) 07/07/2025      Lab Results   Component Value Date    INR 1.27 (H) 07/07/2025    INR 1.28 (H) 12/13/2024    INR 1.1 09/13/2024    PROTIME 16.7 (H) 07/07/2025    PROTIME 16.1 (H) 12/13/2024    PROTIME 11.4 09/13/2024        Lab Results   Component Value Date    ABSOLUTELUNG 26 07/07/2025    ABSOLUTELUNG 29 04/07/2025    ABSOLUTELUNG 34 01/06/2025           1. Chronic combined systolic (congestive) and diastolic (congestive) heart failure    2. Altered mental status, unspecified altered mental status type    3. Chronic HFrEF (heart failure with reduced ejection fraction)    4. NICM (nonischemic cardiomyopathy)    5. Essential hypertension    6. Atrial fibrillation, persistent          ORDERS PLACED TODAY:  Orders Placed This Encounter   Procedures    ReDs Vest    CBC (No Diff)    Ferritin    Basic Metabolic Panel    Iron Profile w/o Ferritin    Magnesium    proBNP    CBC (No Diff)    Ferritin    Basic Metabolic Panel    Iron Profile w/o Ferritin    Magnesium    proBNP    6 Minute Walk Test        Diagnoses and all orders for this visit:    1. Chronic combined systolic (congestive) and diastolic (congestive) heart failure (Primary)  -     CBC (No Diff); Future  -     Ferritin; Future  -     Basic Metabolic Panel; Future  -     Iron Profile w/o Ferritin; Future  -     Magnesium; Future  -     proBNP; Future  -     CBC (No Diff); Standing  -     CBC (No Diff)  -     Ferritin; Standing  -     Ferritin  -     Basic Metabolic Panel; Standing  -     Basic Metabolic Panel  -     Iron Profile w/o Ferritin; Standing  -     Iron Profile w/o Ferritin  -     Magnesium; Standing  -     Magnesium  -     proBNP; Standing  -     proBNP  -     ReDs Vest  -     6 Minute Walk Test    2. Altered mental status, unspecified altered mental status type    3. Chronic HFrEF (heart failure with reduced ejection fraction)    4. NICM (nonischemic cardiomyopathy)    5. Essential hypertension    6. Atrial fibrillation, persistent             MEDS ORDERED  TODAY:    No orders of the defined types were placed in this encounter.       ---------------------------------------------------------------------------------------------------------------------------          ASSESSMENT/PLAN:      Diagnosis Plan   1. Chronic combined systolic (congestive) and diastolic (congestive) heart failure  CBC (No Diff)    Ferritin    Basic Metabolic Panel    Iron Profile w/o Ferritin    Magnesium    proBNP    CBC (No Diff)    CBC (No Diff)    Ferritin    Ferritin    Basic Metabolic Panel    Basic Metabolic Panel    Iron Profile w/o Ferritin    Iron Profile w/o Ferritin    Magnesium    Magnesium    proBNP    proBNP    ReDs Vest    6 Minute Walk Test      2. Altered mental status, unspecified altered mental status type        3. Chronic HFrEF (heart failure with reduced ejection fraction)        4. NICM (nonischemic cardiomyopathy)        5. Essential hypertension        6. Atrial fibrillation, persistent          Given AMS on evaluation developed acutely, Mr. Andrews was sent to the ED as a code stroke with known hx of prior R MCA stroke requiring thrombectomy in 2024.  His daughter was with him and reported she did not think he had missed any Eliquis.  She also reports his behavior during this event was similar to his presentation in 2024 with CVA.   Called and discussed with ED triage RN in addition to ED PA.  Patient sent in wheelchair via staff.            This document has been electronically signed by Mayte Merino PA-C  July 7, 2025 12:45 EDT      Dictated Utilizing Dragon Dictation: Part of this note may be an electronic transcription/translation of spoken language to printed text using the Dragon Dictation System.    Follow-up appointment and medication changes provided in hand delivered After Visit Summary as well as reviewed in the room.

## 2025-07-07 NOTE — PROGRESS NOTES
Heart Failure Clinic  Pharmacist Note     Anshul Andrews is a 70 y.o. male seen in the Heart Failure Clinic for HFrEF with last EF 31-35% on 12/23/24.  Patient had an afib ablation on 5/29/25. His Spironolactone was stopped at that visit and his Toprol was decreased.     Anshul Andrews reports a fair understanding of medications and is accompanied by his daughter who assists him with his medications. Patient reports that he has been feeling great since the ablation and denies any swelling or SOB issues and denies having to use any lasix. Patient and daughter report that his BP's have been on the lower side since the ablation. Daughter reports BP's in the 110's/70's, but when he stands they drop to the 80/50's. The patient denies any lightheadedness at those time. BP in clinic was 93/64 and patient denies lightheadedness at that time.      Daughter reports needing a refill on his Toprol 50mg, as she had been halving the 100mg tablets.       Medication Use:   Hx of med intolerances:  None related to HF  Retail Rx Management: [facility], Medicare coverage started 01/01/2025 and vega was obtained- ship Verquvo, Entresto and Jardiance      Past Medical History:   Diagnosis Date    Atrial fibrillation     Diabetes mellitus     Stroke      ALLERGIES: Patient has no known allergies.  No current facility-administered medications for this encounter.     Current Outpatient Medications   Medication Sig Dispense Refill    albuterol sulfate  (90 Base) MCG/ACT inhaler Inhale 2 puffs Every 4 (Four) Hours As Needed for Wheezing. 8 g 2    apixaban (Eliquis) 5 MG tablet tablet Take 1 tablet by mouth Every 12 (Twelve) Hours. 180 tablet 2    atorvastatin (LIPITOR) 80 MG tablet Take 1 tablet by mouth Daily. 90 tablet 2    empagliflozin (JARDIANCE) 10 MG tablet tablet Take 1 tablet by mouth Daily. 90 tablet 1    Fluticasone Furoate-Vilanterol (Breo Ellipta) 200-25 MCG/ACT inhaler Inhale 1 puff Daily. 30 each 11     "furosemide (Lasix) 20 MG tablet Take 1 tablet by mouth Daily As Needed (Leg swelling). 30 tablet 0    metoprolol succinate XL (TOPROL-XL) 100 MG 24 hr tablet Take 0.5 tablets by mouth Every Night.      sacubitril-valsartan (Entresto) 24-26 MG tablet Take 1 tablet by mouth 2 (Two) Times a Day. 180 tablet 1    Vericiguat 2.5 MG tablet Take 1 tablet by mouth Daily. 30 tablet 1     Facility-Administered Medications Ordered in Other Encounters   Medication Dose Route Frequency Provider Last Rate Last Admin    sodium chloride 0.9 % flush 10 mL  10 mL Intravenous PRN Raffy Quinones, DO           Vaccination History:   Pneumonia: Pt reports UTD and that he goes through PCP   Annual Influenza: [id number] received on 10/3/24  Shingles: Declines 11/19/24    Objective  Vitals:    07/07/25 0917   BP: 93/64   BP Location: Left arm   Patient Position: Sitting   Cuff Size: Adult   Pulse: 75   SpO2: 96%   Weight: 94.8 kg (209 lb)   Height: 198.1 cm (78\")           Wt Readings from Last 3 Encounters:   07/07/25 94.8 kg (209 lb)   07/07/25 94.8 kg (209 lb)   07/01/25 93.5 kg (206 lb 3.2 oz)         07/07/25  0917   Weight: 94.8 kg (209 lb)           Lab Results   Component Value Date    GLUCOSE 106 (H) 07/07/2025    BUN 17.5 07/07/2025    CREATININE 1.04 07/07/2025    BCR 16.8 07/07/2025    K 4.7 07/07/2025    CO2 25.1 07/07/2025    CALCIUM 9.5 07/07/2025    ALBUMIN 4.2 05/06/2025    AST 27 05/06/2025    ALT 45 (H) 05/06/2025     Lab Results   Component Value Date    WBC 12.86 (H) 07/07/2025    HGB 16.7 07/07/2025    HCT 52.3 (H) 07/07/2025    MCV 87.6 07/07/2025     07/07/2025     Lab Results   Component Value Date    TROPONINT 16 12/13/2024     Lab Results   Component Value Date    PROBNP 187.0 07/07/2025     Results for orders placed during the hospital encounter of 05/29/25    Adult Transthoracic Echo Limited W/ Cont if Necessary Per Protocol 05/29/2025  3:30 PM    Interpretation Summary    Left ventricular systolic " function is moderately decreased. Left ventricular ejection fraction appears to be 36 - 40%.    The right ventricular cavity is dilated with borderline systolic function.    No pericardial effusion present.         GDMT    Drug Class   Drug   Dose Last Dose Adjustment Additional Titration   Notes   ACEi/ARB/ARNI Entresto  24/26 mg 10/17/24     Beta Blocker Toprol XL 50mg  HS 4/7/25  Decreased 5/29/25     MRA Stopped  5/29/25     SGLT2i Jardiance  10 mg  10/2/24      Verquvo  2.5mg 11/19/24  Nausea with 5mg    Lasix 20mg PRN      Drug Therapy Problems    GDMT- potential hypotension  Change in mental status when Mayte went into the room.   Needed Toprol 50mg refills      Recommendations:     Made Mayte aware of lower BP concerns. Could decrease Entresto dose- weigh benefit vs risk since EF not recovered.   Patient was sent to the ED for code stroke.       Patient was educated on heart failure medications and the importance of medication adherence. All questions were addressed and patient expressed  understanding.     Thank you for allowing me to participate in the care of your patient,    Emily Boucher, PharmD  07/07/25  10:07 EDT

## 2025-07-07 NOTE — ED PROVIDER NOTES
Subjective   History of Present Illness  70-year-old male with history of A-fib, DM, stroke presents to the ED due to altered mental status and decreased responsiveness.  Patient was in his heart failure clinic today when he stopped following commands and was concerning for a potential stroke.  He was sent down to the ED where I evaluated him in the lobby.  At that time he was ANO x 3 with an NIH of 0.  Case was discussed with neurology and recommended just a CT head and CT angio.       Review of Systems   Constitutional: Negative.  Negative for fever.   HENT: Negative.     Respiratory: Negative.     Cardiovascular: Negative.  Negative for chest pain.   Gastrointestinal: Negative.  Negative for abdominal pain.   Endocrine: Negative.    Genitourinary: Negative.  Negative for dysuria.   Skin: Negative.    Neurological: Negative.    Psychiatric/Behavioral:  Positive for behavioral problems and confusion.    All other systems reviewed and are negative.      Past Medical History:   Diagnosis Date    Atrial fibrillation     Diabetes mellitus     Stroke        No Known Allergies    Past Surgical History:   Procedure Laterality Date    CARDIAC CATHETERIZATION Right 04/18/2025    Procedure: Left Heart Cath;  Surgeon: Jeremy Babb MD;  Location:  [facility];  Service: Cardiovascular;  Laterality: Right;    CARDIAC CATHETERIZATION  04/18/2025    Procedure: Functional Flow [place];  Surgeon: Jeremy Babb MD;  Location:  [facility];  Service: Cardiovascular;;    CARDIAC ELECTROPHYSIOLOGY PROCEDURE N/A 5/29/2025    Procedure: Ablation atrial fibrillation/ PFA; No meds to hold;  Surgeon: Alen Duke MD;  Location:  [facility];  Service: Cardiovascular;  Laterality: N/A;  Alen Duke MD Edelen, Justina, RN; Trang Styles  Can we set him up for PFA.  No meds to hold.  We did need to make sure that he gets a transesophageal echo at [facility] prior to the  procedure date.  I sent a message to    HERNIA REPAIR      umbilical    KNEE SURGERY         Family History   Problem Relation Age of Onset    Heart attack Father     Heart attack Paternal Uncle        Social History     Socioeconomic History    Marital status: Single   Tobacco Use    Smoking status: Some Days     Average packs/day: 1 pack/day for 25.3 years (25.3 ttl pk-yrs)     Types: Cigarettes     Start date: 1/1/2000     Passive exposure: Current    Smokeless tobacco: Never   Vaping Use    Vaping status: Never Used   Substance and Sexual Activity    Alcohol use: Not Currently    Drug use: Yes     Types: Marijuana     Comment: last use a month ago    Sexual activity: Defer           Objective   Physical Exam  Vitals and nursing note reviewed.   Constitutional:       General: He is not in acute distress.     Appearance: He is well-developed. He is not diaphoretic.   HENT:      Head: Normocephalic and atraumatic.      Right Ear: External ear normal.      Left Ear: External ear normal.      Nose: Nose normal.   Eyes:      Conjunctiva/sclera: Conjunctivae normal.      Pupils: Pupils are equal, round, and reactive to light.   Neck:      Vascular: No JVD.      Trachea: No tracheal deviation.   Cardiovascular:      Rate and Rhythm: Normal rate and regular rhythm.      Heart sounds: Normal heart sounds. No murmur heard.  Pulmonary:      Effort: Pulmonary effort is normal. No respiratory distress.      Breath sounds: Normal breath sounds. No wheezing.   Abdominal:      General: Bowel sounds are normal.      Palpations: Abdomen is soft.      Tenderness: There is no abdominal tenderness.   Musculoskeletal:         General: No deformity. Normal range of motion.      Cervical back: Normal range of motion and neck supple.   Skin:     General: Skin is warm and dry.      Coloration: Skin is not pale.      Findings: No erythema or rash.   Neurological:      Mental Status: He is alert and oriented to person, place, and time.       Cranial Nerves: No cranial nerve deficit.   Psychiatric:         Behavior: Behavior normal.         Thought Content: Thought content normal.         Procedures           ED Course  ED Course as of 07/07/25 1237   Mon Jul 07, 2025   1029 EKG interpretation sinus bradycardia 59 bpm QTc is 463 no ST elevations or depressions.  Electronically signed by Raffy Quinones DO, 07/07/25, 10:30 AM EDT.   [GF]      ED Course User Index  [GF] Raffy Quinones DO                                Total (NIH Stroke Scale): 0                      Medical Decision Making  Patient's CT scans were negative for acute findings.  Patient was found to be hypotensive while in the ED.  Patient symptoms likely result of his hypotension.  Case was discussed with Dr. Martines who is his cardiologist.  Dr. Martines recommends we admit the patient for 24 hours on telemetry.  Presented the case to the hospitalist team who is in agreement the plan of care.    Problems Addressed:  Hypotension, unspecified hypotension type: complicated acute illness or injury    Amount and/or Complexity of Data Reviewed  Labs: ordered.  Radiology: ordered.  ECG/medicine tests: ordered.    Risk  Prescription drug management.        Final diagnoses:   Hypotension, unspecified hypotension type       ED Disposition  ED Disposition       ED Disposition   Intended Admit    Condition   --    Comment   --               No follow-up provider specified.       Medication List      No changes were made to your prescriptions during this visit.            Raffy Quinones DO  07/07/25 1233

## 2025-07-07 NOTE — H&P
"    [facility]    Patient Identification:  Name:  Anshul Andrews  Age:  70 y.o.  Sex:  male  :  1955  MRN:  [id number]   Visit Number:  [id number]  Admit Date: 2025   Room number:  3302/1S  Primary Care Physician:  Rosaura Guzman APRN     Subjective     Chief complaint:    Chief Complaint   Patient presents with    Altered Mental Status       History of presenting illness:      Mr. Andrews is our 69 yo M with hx of RASHID, CVA, persisten afib s/p ablation in , HFrEF- nonischemic cardiomyopathy who presents after \"unresponsive\" episode in heart failure clinic. Patient notes it was more of a weakness episode and he felt like he was going to pass out. Patient notes he was just sitting in clinic. He notes he felt like if he stood up, he would have passed out. Patient had an ablation for his afib back in 2025, since then his blood pressure has run on lower side and this home antihypertensive regimen had been titrated down. Positive orthostatics noted in last cardiology appointment before the one today. Patient was able to stand up and go to bathroom on my evaluation with minimal assist.     In the ED, Dr. Martines was contacted by ED staff and recommended obs overnight. Patient admitted as stroke alert that was canceled by ED provider. Patient was hypotensive that has since improved with 500 cc bolus.     ---------------------------------------------------------------------------------------------------------------------   Review of Systems   HENT: Negative.     Eyes: Negative.    Respiratory: Negative.     Cardiovascular: Negative.    Gastrointestinal: Negative.    Endocrine: Negative.    Genitourinary: Negative.    Musculoskeletal:  Positive for neck stiffness.   Skin: Negative.    Allergic/Immunologic: Negative.    Neurological:  Positive for weakness.   Psychiatric/Behavioral: Negative.   "     ---------------------------------------------------------------------------------------------------------------------   Past Medical History:   Diagnosis Date    Atrial fibrillation     Diabetes mellitus     Stroke      Past Surgical History:   Procedure Laterality Date    CARDIAC CATHETERIZATION Right 04/18/2025    Procedure: Left Heart Cath;  Surgeon: Jeremy Babb MD;  Location:  [facility];  Service: Cardiovascular;  Laterality: Right;    CARDIAC CATHETERIZATION  04/18/2025    Procedure: Functional Flow [place];  Surgeon: Jeremy aBbb MD;  Location:  [facility];  Service: Cardiovascular;;    CARDIAC ELECTROPHYSIOLOGY PROCEDURE N/A 5/29/2025    Procedure: Ablation atrial fibrillation/ PFA; No meds to hold;  Surgeon: Alen Duke MD;  Location:  [facility];  Service: Cardiovascular;  Laterality: N/A;  Alen Duke MD Edelen, Justina, RN; Trang Styles  Can we set him up for PFA.  No meds to hold.  We did need to make sure that he gets a transesophageal echo at [facility] prior to the procedure date.  I sent a message to    HERNIA REPAIR      umbilical    KNEE SURGERY       Family History   Problem Relation Age of Onset    Heart attack Father     Heart attack Paternal Uncle      Social History     Socioeconomic History    Marital status: Single   Tobacco Use    Smoking status: Some Days     Average packs/day: 1 pack/day for 25.3 years (25.3 ttl pk-yrs)     Types: Cigarettes     Start date: 1/1/2000     Passive exposure: Current    Smokeless tobacco: Never   Vaping Use    Vaping status: Never Used   Substance and Sexual Activity    Alcohol use: Not Currently    Drug use: Yes     Types: Marijuana     Comment: last use a month ago    Sexual activity: Defer     ---------------------------------------------------------------------------------------------------------------------   Allergies:  Patient has no known  allergies.  ---------------------------------------------------------------------------------------------------------------------   Medications below are reported home medications pulling from within the system; at this time, these medications have not been reconciled unless otherwise specified and are in the verification process for further verifcation as current home medications.    Prior to Admission Medications       Prescriptions Last Dose Informant Patient Reported? Taking?    albuterol sulfate  (90 Base) MCG/ACT inhaler Past Week Child, Other No Yes    Inhale 2 puffs Every 4 (Four) Hours As Needed for Wheezing.    apixaban (Eliquis) 5 MG tablet tablet 7/6/2025 Child, Other No Yes    Take 1 tablet by mouth Every 12 (Twelve) Hours.    atorvastatin (LIPITOR) 80 MG tablet 7/6/2025 Child, Other No Yes    Take 1 tablet by mouth Daily.    B Complex Vitamins (vitamin b complex) capsule capsule 7/6/2025 Child, Other Yes Yes    Take 1 capsule by mouth Daily.    empagliflozin (JARDIANCE) 10 MG tablet tablet 7/6/2025 Child, Other No Yes    Take 1 tablet by mouth Daily.    Fluticasone Furoate-Vilanterol (Breo Ellipta) 200-25 MCG/ACT inhaler 7/6/2025 Child, Other No Yes    Inhale 1 puff Daily.    furosemide (Lasix) 20 MG tablet Past Month Child, Other No Yes    Take 1 tablet by mouth Daily As Needed (Leg swelling).    metoprolol succinate XL (TOPROL-XL) 100 MG 24 hr tablet 7/6/2025 Child, Other No Yes    Take 0.5 tablets by mouth Every Night.    sacubitril-valsartan (Entresto) 24-26 MG tablet 7/6/2025 Child, Other No Yes    Take 1 tablet by mouth 2 (Two) Times a Day.    Vericiguat 2.5 MG tablet 7/6/2025 Child, Other No Yes    Take 1 tablet by mouth Daily.          Objective     Vital Signs:  Temp:  [97.5 °F (36.4 °C)-97.9 °F (36.6 °C)] 97.9 °F (36.6 °C)  Heart Rate:  [50-75] 62  Resp:  [16-18] 16  BP: ()/(55-99) 98/56    Mean Arterial Pressure (Non-Invasive) for the past 24 hrs (Last 3 readings):   Noninvasive  MAP (mmHg)   07/07/25 1343 66   07/07/25 1325 78   07/07/25 1320 76     SpO2:  [96 %-100 %] 98 %  on   ;   Device (Oxygen Therapy): room air  Body mass index is 24.59 kg/m².    Wt Readings from Last 3 Encounters:   07/07/25 96.5 kg (212 lb 11.9 oz)   07/07/25 94.8 kg (209 lb)   07/01/25 93.5 kg (206 lb 3.2 oz)      ---------------------------------------------------------------------------------------------------------------------   Physical Exam:  Constitutional:  Well-developed and well-nourished.  No respiratory distress.      HENT:  Head: Normocephalic and atraumatic.  Mouth:  Moist mucous membranes.    Eyes:  Conjunctivae and EOM are normal.  Pupils are equal, round, and reactive to light.  No scleral icterus.  Cardiovascular:  Normal rate, regular rhythm and normal heart sounds with no murmur.  Pulmonary/Chest:  No respiratory distress, no wheezes, no crackles, with normal breath sounds and good air movement.  Abdominal:  Soft.  Bowel sounds are normal.  No distension and no tenderness.   Musculoskeletal:  No edema, no tenderness, and no deformity.  No red or swollen joints anywhere.    Neurological:  Alert and oriented to person, place, and time.  No cranial nerve deficit.  No focal neurological deficit.   Skin:  Skin is warm and dry.  No rash noted.  No pallor.   Peripheral vascular:  No edema and strong pulses on all 4 extremities.     ---------------------------------------------------------------------------------------------------------------------  EKG:    ECG 12 Lead Chest Pain   Preliminary Result   Test Reason : Chest Pain   Blood Pressure :   */*   mmHG   Vent. Rate :  59 BPM     Atrial Rate :  59 BPM      P-R Int : 158 ms          QRS Dur : 106 ms       QT Int : 468 ms       P-R-T Axes :  70  57  69 degrees     QTcB Int : 463 ms      Sinus bradycardia   Otherwise normal ECG   When compared with ECG of 13-Dec-2024 10:41,   Sinus rhythm has replaced Atrial fibrillation   Vent. rate has decreased by   "41 bpm   ST no longer depressed in Anterior leads   T wave inversion no longer evident in Inferior leads   Nonspecific T wave abnormality no longer evident in Anterolateral leads      Referred By: NICOLASA           Confirmed By:           Telemetry:      I have personally looked at both the EKG and the telemetry strips.    Last echocardiogram:  Results for orders placed during the hospital encounter of 05/29/25    Adult Transthoracic Echo Limited W/ Cont if Necessary Per Protocol 05/29/2025  3:30 PM    Interpretation Summary    Left ventricular systolic function is moderately decreased. Left ventricular ejection fraction appears to be 36 - 40%.    The right ventricular cavity is dilated with borderline systolic function.    No pericardial effusion present.    --------------------------------------------------------------------------------------------------------------------  Labs:  Results from last 7 days   Lab Units 07/07/25  1344 07/07/25  1005 07/07/25  0924   LACTATE mmol/L 2.0  --   --    WBC 10*3/mm3  --  14.94* 12.86*   HEMOGLOBIN g/dL  --  16.5 16.7   HEMATOCRIT %  --  52.3* 52.3*   MCV fL  --  87.6 87.6   MCHC g/dL  --  31.5 31.9   PLATELETS 10*3/mm3  --  276 280   INR   --  1.27*  --          Results from last 7 days   Lab Units 07/07/25  0924   SODIUM mmol/L 140  141   POTASSIUM mmol/L 4.7  4.7   MAGNESIUM mg/dL 2.4   CHLORIDE mmol/L 104  105   CO2 mmol/L 24.9  25.1   BUN mg/dL 17.5  17.5   CREATININE mg/dL 1.03  1.04   CALCIUM mg/dL 9.8  9.5   GLUCOSE mg/dL 108*  106*   ALBUMIN g/dL 4.1   BILIRUBIN mg/dL 0.7   ALK PHOS U/L 127*   AST (SGOT) U/L 26   ALT (SGPT) U/L 34   Estimated Creatinine Clearance: 91.5 mL/min (by C-G formula based on SCr of 1.04 mg/dL).    No results found for: \"AMMONIA\"  Results from last 7 days   Lab Units 07/07/25  1113 07/07/25  1005 07/07/25  0924   HSTROP T ng/L 18 24*  --    PROBNP pg/mL  --  203.0 187.0         Glucose   Date/Time Value Ref Range Status   07/07/2025 " "1600 105 70 - 130 mg/dL Final     Comment:     Serial Number: [id number]:  [id number]   07/07/2025 0953 146 (H) 70 - 130 mg/dL Final     Comment:     Serial Number: [id number]:  [id number]     Lab Results   Component Value Date    TSH 1.770 12/13/2024    FREET4 1.28 12/13/2024     No results found for: \"PREGTESTUR\", \"PREGSERUM\", \"HCG\", \"HCGQUANT\"  Pain Management Panel          Latest Ref Rng & Units 5/6/2025   Pain Management Panel   Creatinine, Urine mg/dL 78.5      Brief Urine Lab Results  (Last result in the past 365 days)        Color   Clarity   Blood   Leuk Est   Nitrite   Protein   CREAT   Urine HCG        07/07/25 1021 Orange   Clear   Large (3+)   Trace   Negative   Trace                 No results found for: \"BLOODCX\"  No results found for: \"URINECX\"  No results found for: \"WOUNDCX\"  No results found for: \"STOOLCX\"    I have personally looked at the labs and they are summarized above.  ----------------------------------------------------------------------------------------------------------------------  Detailed radiology reports for the last 24 hours:    Imaging Results (Last 24 Hours)       Procedure Component Value Units Date/Time    XR Chest 1 View [[id number]] Collected: 07/07/25 1214     Updated: 07/07/25 1216    Narrative:      XR CHEST 1 VW-     CLINICAL INDICATION: Chest Pain Protocol        COMPARISON: 12/13/2024     TECHNIQUE: Single frontal view of the chest.     FINDINGS:     LUNGS: Lungs are adequately aerated.      HEART AND MEDIASTINUM: Heart and mediastinal contours are unremarkable        SKELETON: Bony and soft tissue structures are unremarkable.             Impression:      No radiographic evidence of acute cardiac or pulmonary disease.           This report was finalized on 7/7/2025 12:14 PM by Dr. Jacoby Montelongo MD.       CT Head Without Contrast [[id number]] Collected: 07/07/25 1048     Updated: 07/07/25 1051    Narrative:      CT HEAD WO CONTRAST-     CLINICAL INDICATION: " AMS        COMPARISON: None immediately available     TECHNIQUE: Axial images of the brain were obtained with out intravenous  contrast.  Reformatted images were created in the sagittal and coronal  planes.     DOSE:     Radiation dose reduction techniques were utilized per ALARA protocol.  Automated exposure control was initiated through either or [employer] or  [employer] software packages by  protocol.        FINDINGS:    BRAIN:  Unremarkable.  No hemorrhage.  No significant white matter  disease.  No edema.       VENTRICLES:  Unremarkable.  No ventriculomegaly.       BONES/JOINTS:  Unremarkable.  No acute fracture.       SOFT TISSUES:  Unremarkable.       SINUSES:  no air fluid levels       MASTOID AIR CELLS:  Unremarkable as visualized.  No mastoid effusion.          Impression:      Cerebral atrophy.     No parenchymal mass, hemorrhage, or midline shift     This report was finalized on 7/7/2025 10:48 AM by Dr. Jacoby Montelongo MD.       CT Angiogram Neck [[id number]] Collected: 07/07/25 1047     Updated: 07/07/25 1050    Narrative:      EXAM:    CT Angiography Neck With Intravenous Contrast     EXAM DATE:    7/7/2025 10:14 AM     CLINICAL HISTORY:    change in mental status     TECHNIQUE:    Axial computed tomographic angiography images of the neck with  intravenous contrast.  This CT exam was performed using one or more of  the following dose reduction techniques:  automated exposure control,  adjustment of the mA and/or kV according to patient size, and/or use of  iterative reconstruction technique.    MIP reconstructed images were created and reviewed.     COMPARISON:    None.     FINDINGS:      VASCULATURE:    RIGHT COMMON CAROTID ARTERY:  Unremarkable.  No occlusion or  significant stenosis.  No dissection.    RIGHT INTERNAL CAROTID ARTERY:  Plaque in the right carotid bulb and  plaque in the left carotid bulb but no significant stenosis.  No  dissection.    RIGHT EXTERNAL CAROTID ARTERY:   Unremarkable.  No occlusion.    RIGHT VERTEBRAL ARTERY:  Unremarkable.  No occlusion or significant  stenosis.  No dissection.       LEFT COMMON CAROTID ARTERY:  Unremarkable.  No occlusion or  significant stenosis.  No dissection.    LEFT INTERNAL CAROTID ARTERY:  See above.    LEFT EXTERNAL CAROTID ARTERY:  Unremarkable.  No occlusion.    LEFT VERTEBRAL ARTERY:  Unremarkable.  No occlusion or significant  stenosis.  No dissection.      NECK:    BONES/JOINTS:  Unremarkable.  No acute fracture.    SOFT TISSUES:  Unremarkable.    LUNG APICES:  Clear.      CAROTID STENOSIS REFERENCE USING NASCET CRITERIA:    % ICA stenosis = (1 - narrowest ICA diameter/diameter of distal  cervical ICA) x 100.    Mild - <50% stenosis.    Moderate - 50-69% stenosis.    Severe - 70-94% stenosis.    Near occlusion - 95-99% stenosis.    Occluded - 100% stenosis.       Impression:        Plaque in the right carotid bulb and plaque in the left carotid bulb  but no significant stenosis.     This report was finalized on 7/7/2025 10:48 AM by Dr. Jacoby Montelongo MD.       CT Angiogram Head [[id number]] Collected: 07/07/25 1045     Updated: 07/07/25 1049    Narrative:      EXAM:    CT Angiography Head With Intravenous Contrast     EXAM DATE:    7/7/2025 10:13 AM     CLINICAL HISTORY:    change in mental status     TECHNIQUE:    Axial computed tomographic angiography images of the head with  intravenous contrast. LVO analysis was performed with [email or web address] software.   This CT exam was performed using one or more of the following dose  reduction techniques:  automated exposure control, adjustment of the mA  and/or kV according to patient size, and/or use of iterative  reconstruction technique.    MIP reconstructed images were created and reviewed.     COMPARISON:    No relevant prior studies available.     FINDINGS:    RIGHT INTERNAL CAROTID ARTERY:  No acute findings.  Intracranial  segment is patent with no significant stenosis.  No aneurysm.    RIGHT  ANTERIOR CEREBRAL ARTERY:  Unremarkable.  No occlusion or  significant stenosis.  No aneurysm.    RIGHT MIDDLE CEREBRAL ARTERY:  Unremarkable.  No occlusion or  significant stenosis.  No aneurysm.    RIGHT POSTERIOR CEREBRAL ARTERY:  Unremarkable.  No occlusion or  significant stenosis.  No aneurysm.    RIGHT VERTEBRAL ARTERY:  Unremarkable as visualized.       LEFT INTERNAL CAROTID ARTERY:  No acute findings.  Intracranial  segment is patent with no significant stenosis.  No aneurysm.    LEFT ANTERIOR CEREBRAL ARTERY:  Unremarkable.  No occlusion or  significant stenosis.  No aneurysm.    LEFT MIDDLE CEREBRAL ARTERY:  Unremarkable.  No occlusion or  significant stenosis.  No aneurysm.    LEFT POSTERIOR CEREBRAL ARTERY:  Unremarkable.  No occlusion or  significant stenosis.  No aneurysm.    LEFT VERTEBRAL ARTERY:  Unremarkable as visualized.       BASILAR ARTERY:  Unremarkable.  No occlusion or significant stenosis.   No aneurysm.       Impression:        No acute findings in the arteries of the head/brain.     This report was finalized on 7/7/2025 10:46 AM by Dr. Jacoby Montelongo MD.             Final impressions for the last 30 days of radiology reports:    XR Chest 1 View  Result Date: 7/7/2025  No radiographic evidence of acute cardiac or pulmonary disease.    This report was finalized on 7/7/2025 12:14 PM by Dr. Jacoby Montelongo MD.      CT Head Without Contrast  Result Date: 7/7/2025  Cerebral atrophy.  No parenchymal mass, hemorrhage, or midline shift  This report was finalized on 7/7/2025 10:48 AM by Dr. Jacoby Montelongo MD.      CT Angiogram Neck  Result Date: 7/7/2025    Plaque in the right carotid bulb and plaque in the left carotid bulb but no significant stenosis.  This report was finalized on 7/7/2025 10:48 AM by Dr. Jacoby Montelongo MD.      CT Angiogram Head  Result Date: 7/7/2025    No acute findings in the arteries of the head/brain.  This report was finalized on 7/7/2025 10:46 AM by Dr. Jacoby Montelongo MD.       I have personally looked at the radiology images and read the final radiology report.    Assessment & Plan       #Presyncope  #Hypotension  #Nonischemic cardiomyopathy   #Persistent afib s/p ablation   #Hx of CVA  - CT, CTA head unrevealing for acute findings. No residual symptoms. Likely explained by hypotension.   - Suspect hypotension from GDMT. Will be conservative with regimen.   - Continue apixiban   - Consult cardiology   - Monitor on tele overnight     Code stratus: Full     Dispo: Likely home in AM    Prince May MD  [facility] Hospitalist  07/07/25  16:18 EDT

## 2025-07-07 NOTE — PATIENT INSTRUCTIONS
Heart Failure Action Plan  A heart failure action plan helps you know what to do when you have symptoms of heart failure. Your action plan is a color-coded plan that lists the symptoms to watch for and indicates what actions to take.  If you have symptoms in the green zone, you're doing well.  If you have symptoms in the yellow zone, you're having problems.  If you have symptoms in the red zone, you need medical care right away.  Follow the plan that was created by you and your health care provider. Review your plan each time you visit your provider.  Green zone  These signs mean you're doing well and can continue what you're doing:  You don't have new or worsening shortness of breath.  You have very little swelling or no new swelling.  Your weight is stable (no gain or loss).  You have a normal activity level.  You don't have chest pain or any other new symptoms.  Yellow zone  These signs and symptoms mean your condition may be getting worse and you should make some changes:  You have trouble breathing when you're active.  You have swelling in your feet or legs or have discomfort in your belly.  You gain 2-3 lb (0.9-1.4 kg) in 24 hours, or 5 lb (2.3 kg) in a week. This amount may be more or less depending on your condition.  You get tired easily.  You have trouble sleeping.  You have a dry cough.  If you have any of these symptoms:  Contact your provider within the next day.  Your provider may adjust your medicines.  Red zone  These signs and symptoms mean you should get medical help right away:  You have trouble breathing when resting or cannot lie flat and you need to raise your head to help you breathe.  You have a dry cough that's getting worse.  You have swelling or pain in your feet or legs or discomfort in your belly that's getting worse.  You suddenly gain more than 2-3 lb (0.9-1.4 kg) in 24 hours, or more than 5 lb (2.3 kg) in a week. This amount may be more or less depending on your condition.  You have  trouble staying awake or you feel confused.  You don't have an appetite.  You have worsening sadness or depression.  These symptoms may be an emergency. Call 911 right away.  Do not wait to see if the symptoms will go away.  Do not drive yourself to the hospital.  Follow these instructions at home:  Take medicines only as told.  Eat a heart-healthy diet. Work with a dietitian to create an eating plan that's best for you.  Weigh yourself each day.   Call your provider if you gain more than 3 lb in 24 hours, or more than 5 lb in a week.  Health care provider name: [facility] care provider phone number: [phone]

## 2025-07-07 NOTE — PLAN OF CARE
Goal Outcome Evaluation:  Plan of Care Reviewed With: patient  Progress: no change   Pt admitted [facility] to [facility] this shift. Assessment and admission completed. Pt resting in bed, watching television. Pt has tolerated all interventions. No complaints/concerns. No acute distress noted. Call light within reach. Plan of care ongoing.

## 2025-07-08 ENCOUNTER — READMISSION MANAGEMENT (OUTPATIENT)
Dept: CALL CENTER | Facility: HOSPITAL | Age: 70
End: 2025-07-08
Payer: MEDICARE

## 2025-07-08 VITALS
HEART RATE: 74 BPM | OXYGEN SATURATION: 99 % | DIASTOLIC BLOOD PRESSURE: 64 MMHG | SYSTOLIC BLOOD PRESSURE: 135 MMHG | HEIGHT: 78 IN | BODY MASS INDEX: 24.1 KG/M2 | RESPIRATION RATE: 18 BRPM | TEMPERATURE: 98.1 F | WEIGHT: 208.3 LBS

## 2025-07-08 LAB
ANION GAP SERPL CALCULATED.3IONS-SCNC: 10 MMOL/L (ref 5–15)
BASOPHILS # BLD AUTO: 0.07 10*3/MM3 (ref 0–0.2)
BASOPHILS NFR BLD AUTO: 0.7 % (ref 0–1.5)
BUN SERPL-MCNC: 17.2 MG/DL (ref 8–23)
BUN/CREAT SERPL: 17.2 (ref 7–25)
CALCIUM SPEC-SCNC: 9.1 MG/DL (ref 8.6–10.5)
CHLORIDE SERPL-SCNC: 106 MMOL/L (ref 98–107)
CO2 SERPL-SCNC: 25 MMOL/L (ref 22–29)
CREAT SERPL-MCNC: 1 MG/DL (ref 0.76–1.27)
DEPRECATED RDW RBC AUTO: 51.8 FL (ref 37–54)
EGFRCR SERPLBLD CKD-EPI 2021: 81.5 ML/MIN/1.73
EOSINOPHIL # BLD AUTO: 0.21 10*3/MM3 (ref 0–0.4)
EOSINOPHIL NFR BLD AUTO: 2.2 % (ref 0.3–6.2)
ERYTHROCYTE [DISTWIDTH] IN BLOOD BY AUTOMATED COUNT: 16.5 % (ref 12.3–15.4)
GLUCOSE SERPL-MCNC: 94 MG/DL (ref 65–99)
HCT VFR BLD AUTO: 51.3 % (ref 37.5–51)
HGB BLD-MCNC: 16.2 G/DL (ref 13–17.7)
IMM GRANULOCYTES # BLD AUTO: 0.04 10*3/MM3 (ref 0–0.05)
IMM GRANULOCYTES NFR BLD AUTO: 0.4 % (ref 0–0.5)
LYMPHOCYTES # BLD AUTO: 2.74 10*3/MM3 (ref 0.7–3.1)
LYMPHOCYTES NFR BLD AUTO: 28.8 % (ref 19.6–45.3)
MCH RBC QN AUTO: 27.7 PG (ref 26.6–33)
MCHC RBC AUTO-ENTMCNC: 31.6 G/DL (ref 31.5–35.7)
MCV RBC AUTO: 87.8 FL (ref 79–97)
MONOCYTES # BLD AUTO: 0.71 10*3/MM3 (ref 0.1–0.9)
MONOCYTES NFR BLD AUTO: 7.5 % (ref 5–12)
NEUTROPHILS NFR BLD AUTO: 5.74 10*3/MM3 (ref 1.7–7)
NEUTROPHILS NFR BLD AUTO: 60.4 % (ref 42.7–76)
NRBC BLD AUTO-RTO: 0 /100 WBC (ref 0–0.2)
PLATELET # BLD AUTO: 209 10*3/MM3 (ref 140–450)
PMV BLD AUTO: 9.9 FL (ref 6–12)
POTASSIUM SERPL-SCNC: 4.7 MMOL/L (ref 3.5–5.2)
QT INTERVAL: 468 MS
QTC INTERVAL: 463 MS
RBC # BLD AUTO: 5.84 10*6/MM3 (ref 4.14–5.8)
SODIUM SERPL-SCNC: 141 MMOL/L (ref 136–145)
WBC NRBC COR # BLD AUTO: 9.51 10*3/MM3 (ref 3.4–10.8)

## 2025-07-08 PROCEDURE — 94799 UNLISTED PULMONARY SVC/PX: CPT

## 2025-07-08 PROCEDURE — 97161 PT EVAL LOW COMPLEX 20 MIN: CPT

## 2025-07-08 PROCEDURE — 99239 HOSP IP/OBS DSCHRG MGMT >30: CPT | Performed by: INTERNAL MEDICINE

## 2025-07-08 PROCEDURE — G0378 HOSPITAL OBSERVATION PER HR: HCPCS

## 2025-07-08 PROCEDURE — 85025 COMPLETE CBC W/AUTO DIFF WBC: CPT | Performed by: INTERNAL MEDICINE

## 2025-07-08 PROCEDURE — 94664 DEMO&/EVAL PT USE INHALER: CPT

## 2025-07-08 PROCEDURE — 80048 BASIC METABOLIC PNL TOTAL CA: CPT | Performed by: INTERNAL MEDICINE

## 2025-07-08 RX ORDER — METOPROLOL SUCCINATE 25 MG/1
25 TABLET, EXTENDED RELEASE ORAL NIGHTLY
Qty: 30 TABLET | Refills: 0 | Status: SHIPPED | OUTPATIENT
Start: 2025-07-08 | End: 2025-08-07

## 2025-07-08 RX ADMIN — ATORVASTATIN CALCIUM 80 MG: 40 TABLET, FILM COATED ORAL at 08:50

## 2025-07-08 RX ADMIN — EMPAGLIFLOZIN 10 MG: 10 TABLET, FILM COATED ORAL at 08:50

## 2025-07-08 RX ADMIN — Medication 10 ML: at 08:50

## 2025-07-08 RX ADMIN — APIXABAN 5 MG: 5 TABLET, FILM COATED ORAL at 08:50

## 2025-07-08 NOTE — DISCHARGE SUMMARY
"    [facility] HOSPITALISTS DISCHARGE SUMMARY    Patient Identification:  Name:  Anshul Andrews  Age:  70 y.o.  Sex:  male  :  1955  MRN:  [id number]  Visit Number:  [id number]    Date of Admission: 2025  Date of Discharge:  2025    PCP: Rosaura Guzman APRN    DISCHARGE DIAGNOSIS  #Presyncope  #Hypotension  #Nonischemic cardiomyopathy   #Persistent afib s/p ablation   #Hx of CVA    CONSULTS   Cardiology    PROCEDURES PERFORMED  None     HOSPITAL COURSE  Patient is a 70 y.o. male presented on  to [facility] complaining of acute weakness and decreased responsiveness episode at heart failure clinic.  Please see the admitting history and physical for further details.      Mr. Andrews is our 71 yo M with hx of RASHID, CVA, persisten afib s/p ablation in , HFrEF- nonischemic cardiomyopathy who presents after decreased responsive episode in heart failure clinic.     Mr. Andrews is our 71 yo M with hx of RASHID, CVA, persisten afib s/p ablation in , HFrEF- nonischemic cardiomyopathy who presents after \"unresponsive\" episode in heart failure clinic. Patient notes it was more of a weakness episode and he felt like he was going to pass out. Patient notes he was just sitting in clinic. He notes he felt like if he stood up, he would have passed out. Patient had an ablation for his afib back in 2025, since then his blood pressure has run on lower side and this home antihypertensive regimen had been titrated down. Positive orthostatics noted in last cardiology appointment before the one today. In the ED, Dr. Martines was contacted by ED staff and recommended obs overnight. Patient admitted as stroke alert that was canceled by ED provider. Patient was hypotensive that has since improved with 500 cc bolus. CT, CTA head unrevealing for acute findings. No residual symptoms. Likely explained by hypotension. Suspect hypotension from GDMT. Will be conservative with regimen. Patient " observed on tele overnight. No recurrent hypotension or orthostatics. Patient walked around the unit with PT without symptoms. Restarted 1/2 dose of home metoprolol. Will restart home entresto but hold home verquvo. Discussed with cardiology team at discharged. Agreed with GDMT. Will have patient f/u with cardiology and heart failure clinic.       VITAL SIGNS:  Temp:  [97.5 °F (36.4 °C)-98.1 °F (36.7 °C)] 98 °F (36.7 °C)  Heart Rate:  [50-73] 50  Resp:  [16-18] 18  BP: ()/(55-99) 126/81  SpO2:  [94 %-100 %] 99 %  on   ;   Device (Oxygen Therapy): room air    Body mass index is 24.07 kg/m².  Wt Readings from Last 3 Encounters:   07/08/25 94.5 kg (208 lb 4.8 oz)   07/07/25 94.8 kg (209 lb)   07/01/25 93.5 kg (206 lb 3.2 oz)       PHYSICAL EXAM:  Constitutional:  Well-developed and well-nourished.  No respiratory distress.      HENT:  Head:  Normocephalic and atraumatic.  Mouth:  Moist mucous membranes.    Eyes:  Conjunctivae and EOM are normal.  Pupils are equal, round, and reactive to light.  No scleral icterus.    Cardiovascular:  Normal rate, regular rhythm and normal heart sounds with no murmur.  Pulmonary/Chest:  No respiratory distress, no wheezes, no crackles, with normal breath sounds and good air movement.  Abdominal:  Soft.  Bowel sounds are normal.  No distension and no tenderness.   Musculoskeletal:  No edema, no tenderness, and no deformity.  No red or swollen joints anywhere.    Neurological:  Alert and oriented to person, place, and time.  No gross neurological deficit.   Skin:  Skin is warm and dry. No rash noted. No pallor.   Peripheral vascular:  Strong pulses in all 4 extremities with no clubbing, no cyanosis, no edema.    DISCHARGE DISPOSITION   Stable    DISCHARGE MEDICATIONS:     Discharge Medications        Changes to Medications        Instructions Start Date   metoprolol succinate XL 25 MG 24 hr tablet  Commonly known as: TOPROL-XL  What changed:   medication strength  how much to take    25 mg, Oral, Nightly             Continue These Medications        Instructions Start Date   albuterol sulfate  (90 Base) MCG/ACT inhaler  Commonly known as: PROVENTIL HFA;VENTOLIN HFA;PROAIR HFA   2 puffs, Inhalation, Every 4 Hours PRN      apixaban 5 MG tablet tablet  Commonly known as: Eliquis   5 mg, Oral, Every 12 Hours Scheduled      atorvastatin 80 MG tablet  Commonly known as: LIPITOR   80 mg, Oral, Daily      Entresto 24-26 MG tablet  Generic drug: sacubitril-valsartan   Take 1 tablet by mouth 2 (Two) Times a Day.      Fluticasone Furoate-Vilanterol 200-25 MCG/ACT inhaler  Commonly known as: Breo Ellipta   1 puff, Inhalation, Daily - RT      furosemide 20 MG tablet  Commonly known as: Lasix   20 mg, Oral, Daily PRN      Jardiance 10 MG tablet tablet  Generic drug: empagliflozin   10 mg, Oral, Daily      vitamin b complex capsule capsule   1 capsule, Daily             Stop These Medications      Verquvo 2.5 MG tablet  Generic drug: Vericiguat                 Follow-up Information       Rosaura Guzman, CHARISSE .    Specialty: Family Medicine  Contact information:  [address] [place] [id number]  [phone]                              TEST  RESULTS PENDING AT DISCHARGE       CODE STATUS  Code Status and Medical Interventions: CPR (Attempt to Resuscitate); Full Support   Ordered at: 07/07/25 1239     Code Status (Patient has no pulse and is not breathing):    CPR (Attempt to Resuscitate)     Medical Interventions (Patient has pulse or is breathing):    Full Support       The ASCVD Risk score (Alice SCHWARTZ, et al., 2019) failed to calculate for the following reasons:    Risk score cannot be calculated because patient has a medical history suggesting prior/existing ASCVD     Prince May MD  [facility]  07/08/25  10:36 EDT    Please note that this discharge summary required more than 30 minutes to complete.

## 2025-07-08 NOTE — NURSING NOTE
Pt being discharged home today on room air. TIFFANIE Blair made aware the discharge is complete. Family to provide transportation.

## 2025-07-08 NOTE — DISCHARGE INSTRUCTIONS
Please take medications as prescribed. Please go to follow-up appointments as recommended. Please seek medical attention if you have worsening of any symptoms. Please check Blood pressure daily and bring to clinic appointments.

## 2025-07-08 NOTE — PLAN OF CARE
Goal Outcome Evaluation:  Plan of Care Reviewed With: patient        Progress: no change        Patient has been resting in bed during this shift. VSS. No signs or symptoms of acute distress noted at this time. Plan of care ongoing.

## 2025-07-08 NOTE — THERAPY EVALUATION
Acute Care - Physical Therapy Initial Evaluation  [facility]     Patient Name: Anshul Andrews  : 1955  MRN: [id number]  Today's Date: 2025      Visit Dx:     ICD-10-CM ICD-9-CM   1. Hypotension, unspecified hypotension type  I95.9 458.9     Patient Active Problem List   Diagnosis    CVA (cerebral vascular accident)    Cardiac arrhythmia    Simple chronic bronchitis    Prediabetes    Atrial fibrillation    Sleep apnea    Chronic combined systolic (congestive) and diastolic (congestive) heart failure    NSVT (nonsustained ventricular tachycardia)    SHERIFF (dyspnea on exertion)    Abnormal cardiac CT angiography    Arthritis    Essential hypertension    ASCVD (arteriosclerotic cardiovascular disease)    NICM (nonischemic cardiomyopathy)    Chronic HFrEF (heart failure with reduced ejection fraction)    Atrial fibrillation, persistent    Hypotension     Past Medical History:   Diagnosis Date    Atrial fibrillation     Diabetes mellitus     Stroke      Past Surgical History:   Procedure Laterality Date    CARDIAC CATHETERIZATION Right 2025    Procedure: Left Heart Cath;  Surgeon: Jeremy Babb MD;  Location:  [facility];  Service: Cardiovascular;  Laterality: Right;    CARDIAC CATHETERIZATION  2025    Procedure: Functional Flow [place];  Surgeon: Jeremy Babb MD;  Location:  [facility];  Service: Cardiovascular;;    CARDIAC ELECTROPHYSIOLOGY PROCEDURE N/A 2025    Procedure: Ablation atrial fibrillation/ PFA; No meds to hold;  Surgeon: Alen Duke MD;  Location:  [facility] INVASIVE LOCATION;  Service: Cardiovascular;  Laterality: N/A;  Alen Duke MD Edelen, Justina, RN; Trang Styles  Can we set him up for PFA.  No meds to hold.  We did need to make sure that he gets a transesophageal echo at [facility] prior to the procedure date.  I sent a message to    HERNIA REPAIR      umbilical    KNEE SURGERY       PT Assessment (Last 12 Hours)        PT Evaluation and Treatment       Row Name 07/08/25 1051          Physical Therapy Time and Intention    Subjective Information no complaints  -KM     Document Type evaluation  -KM     Mode of Treatment individual therapy;physical therapy  -KM     Patient Effort good  -KM     Symptoms Noted During/After Treatment none  -KM       Row Name 07/08/25 1051          General Information    Patient Profile Reviewed yes  -KM     Patient Observations alert;cooperative;agree to therapy  -KM     Prior Level of Function independent:;all household mobility;ADL's  -KM     Existing Precautions/Restrictions no known precautions/restrictions  -KM     Risks Reviewed patient:;LOB;nausea/vomiting;dizziness;increased discomfort  -KM     Benefits Reviewed patient:;improve function;increase independence;increase strength;increase balance  -KM     Barriers to Rehab none identified  -KM       Row Name 07/08/25 1051          Living Environment    Current Living Arrangements home  -KM     People in Home alone  -KM     Primary Care Provided by self  -KM       Row Name 07/08/25 1051          Home Use of Assistive/Adaptive Equipment    Equipment Currently Used at Home none  -KM       Row Name 07/08/25 1051          Pain    Pretreatment Pain Rating 0/10 - no pain  -KM     Posttreatment Pain Rating 0/10 - no pain  -KM       Row Name 07/08/25 1051          Cognition    Affect/Mental Status (Cognition) WFL  -KM     Orientation Status (Cognition) oriented x 3  -KM     Follows Commands (Cognition) WFL  -       Row Name 07/08/25 1051          Range of Motion (ROM)    Range of Motion bilateral lower extremities;ROM is L  -KM       [facility] Name 07/08/25 1051          Strength (Manual Muscle Testing)    Strength (Manual Muscle Testing) bilateral lower extremities;strength is [facility]  -       Row Name 07/08/25 1051          Bed Mobility    Bed Mobility bed mobility (all) activities  -KM     All Activities, [place] (Bed Mobility) independent  -KM        Row Name 07/08/25 1051          Transfers    Transfers sit-stand transfer;stand-sit transfer  -KM       Row Name 07/08/25 1051          Sit-Stand Transfer    Sit-Stand [place] (Transfers) independent  -KM       Row Name 07/08/25 1051          Stand-Sit Transfer    Stand-Sit [place] (Transfers) independent  -KM       Row Name 07/08/25 1051          Gait/Stairs (Locomotion)    Gait/Stairs Locomotion gait/ambulation independence;distance ambulated  -KM     [place] Level (Gait) independent  -KM     Patient was able to Ambulate yes  -KM     Distance in Feet (Gait) 250  -KM     Pattern (Gait) swing-through  -KM       Row Name 07/08/25 1051          Balance    Balance Assessment sitting static balance;standing dynamic balance  -KM     Static Sitting Balance independent  -KM     Position, Sitting Balance sitting edge of bed  -KM     Dynamic Standing Balance independent  -KM       Row Name 07/08/25 1051          Plan of Care Review    Plan of Care Reviewed With patient  -KM     Outcome Evaluation Pt. evaluation completed during PT session. He was able to perform functional mobility skills independently. He ambulated increased distance w/ no AD. He tolerated session w/ no complaints. Pt. does not require skilled PT services at this time.  -KM       Row Name 07/08/25 1051          Therapy Assessment/Plan (PT)    Functional Level at Time of Evaluation (PT) independent  -     Criteria for Skilled Interventions Met (PT) no;does not meet criteria for skilled intervention  -KM     Therapy Frequency (PT) evaluation only  -       Row Name 07/08/25 1051          Therapy Plan Review/Discharge Plan (PT)    Therapy Plan Review (PT) evaluation/treatment results reviewed;patient  -KM               User Key  (r) = Recorded By, (t) = Taken By, (c) = Cosigned By      Initials Name Provider Type    Nilo Velazquez, PT Physical Therapist                    Physical Therapy Education       Title: PT OT SLP Therapies (Done)        Topic: Physical Therapy (Done)       Point: Mobility training (Done)       Learning Progress Summary            Patient Acceptance, E,TB, VU by  at 7/8/2025 1055                      Point: Home exercise program (Done)       Learning Progress Summary            Patient Acceptance, E,TB, VU by  at 7/8/2025 1055                      Point: Body mechanics (Done)       Learning Progress Summary            Patient Acceptance, E,TB, VU by  at 7/8/2025 1055                      Point: Precautions (Done)       Learning Progress Summary            Patient Acceptance, E,TB, VU by  at 7/8/2025 1055                                      User Key       Initials Effective Dates Name Provider Type Discipline     05/24/22 -  Nilo Loomis, PT Physical Therapist PT                  PT Recommendation and Plan  Anticipated Discharge Disposition (PT): home  Therapy Frequency (PT): evaluation only  Plan of Care Reviewed With: patient  Outcome Evaluation: Pt. evaluation completed during PT session. He was able to perform functional mobility skills independently. He ambulated increased distance w/ no AD. He tolerated session w/ no complaints. Pt. does not require skilled PT services at this time.       Time Calculation:    PT Charges       Row Name 07/08/25 1050             Time Calculation    PT Received On 07/08/25  -                User Key  (r) = Recorded By, (t) = Taken By, (c) = Cosigned By      Initials Name Provider Type     Nilo Loomis, PT Physical Therapist                  Therapy Charges for Today       Code Description Service Date Service Provider Modifiers Qty    00851104319 HC PT EVAL LOW COMPLEXITY 4 7/8/2025 Nilo Loomis, PT GP 1            PT G-Codes  AM-PAC 6 Clicks Score (PT): 24    Nilo Loomis PT  7/8/2025

## 2025-07-08 NOTE — OUTREACH NOTE
Prep Survey      Flowsheet Row Responses   [facility] patient discharged from? [facility]   Is LACE score < 7 ? No   Eligibility [facility]   Date of Admission 07/07/25   Date of Discharge 07/08/25   Discharge Disposition Home or Self Care   Discharge diagnosis Hypotension, presyncope   Does the patient have one of the following disease processes/diagnoses(primary or secondary)? Other   Does the patient have Home health ordered? No   Is there a DME ordered? No   Prep survey completed? Yes            Coty GOOD - Registered Nurse

## 2025-07-09 ENCOUNTER — TRANSITIONAL CARE MANAGEMENT TELEPHONE ENCOUNTER (OUTPATIENT)
Dept: CALL CENTER | Facility: HOSPITAL | Age: 70
End: 2025-07-09
Payer: MEDICARE

## 2025-07-09 NOTE — OUTREACH NOTE
Call Center TCM Note      Flowsheet Row Responses   [facility] patient discharged from? [facility]   Does the patient have one of the following disease processes/diagnoses(primary or secondary)? Other   TCM attempt successful? Yes  [vr for Yogesh, son]   Call start time 1056   Call end time 1057   Discharge diagnosis Hypotension, presyncope   Meds reviewed with patient/caregiver? Yes   Is the patient having any side effects they believe may be caused by any medication additions or changes? No   Does the patient have all medications ordered at discharge? Yes   Is the patient taking all medications as directed (includes completed medication regime)? Yes   Comments Hospital f/u 7/10/25@215pm   Does the patient have an appointment with their PCP within 7-14 days of discharge? Yes   Nursing Interventions Confirmed date/time of appointment   Has home health visited the patient within 72 hours of discharge? N/A   Psychosocial issues? No   Did the patient receive a copy of their discharge instructions? Yes   Nursing interventions Reviewed instructions with patient   What is the patient's perception of their health status since discharge? Improving  [Pt is doing well and has no questions.  Encouraged to check daily BP and bring to f/u for review as noted med changes.  Pt is compliant with daily wts.]   Is the patient/caregiver able to teach back signs and symptoms related to disease process for when to call PCP? Yes   Is the patient/caregiver able to teach back signs and symptoms related to disease process for when to call 911? Yes   TCM call completed? Yes   Call end time 1057            JULIO MORALES - Registered Nurse    7/9/2025, 10:58 EDT

## 2025-07-10 ENCOUNTER — OFFICE VISIT (OUTPATIENT)
Dept: FAMILY MEDICINE CLINIC | Facility: CLINIC | Age: 70
End: 2025-07-10
Payer: MEDICARE

## 2025-07-10 VITALS
OXYGEN SATURATION: 96 % | HEART RATE: 97 BPM | TEMPERATURE: 96.1 F | WEIGHT: 210 LBS | HEIGHT: 78 IN | DIASTOLIC BLOOD PRESSURE: 70 MMHG | RESPIRATION RATE: 16 BRPM | BODY MASS INDEX: 24.3 KG/M2 | SYSTOLIC BLOOD PRESSURE: 96 MMHG

## 2025-07-10 DIAGNOSIS — I95.9 HYPOTENSION, UNSPECIFIED HYPOTENSION TYPE: Primary | ICD-10-CM

## 2025-07-10 NOTE — PROGRESS NOTES
Transitional Care Follow Up Visit  Subjective     Anshul Andrews is a 70 y.o. male who presents for a transitional care management visit.    Within 48 business hours after discharge our office contacted him via telephone to coordinate his care and needs.      I reviewed and discussed the details of that call along with the discharge summary, hospital problems, inpatient lab results, inpatient diagnostic studies, and consultation reports with Anshul.     Current outpatient and discharge medications have been reconciled for the patient.  Reviewed by: CHARISSE Conrad          7/8/2025     1:45 PM   Date of TCM Phone Call   [facility]   Date of Admission 7/7/2025   Date of Discharge 7/8/2025   Discharge Disposition Home or Self Care     Risk for Readmission (LACE) Score: 7 (7/8/2025  6:00 AM)      History of Present Illness  He presents for hospital follow-up of hypotension.  He states he was at the heart failure clinic and started feeling like he was going to pass out and his daughter states he did pass out.  They took him to ER where he was admitted to the hospital.  Some of his medications were decreased.  He states he has not felt like he was going to pass out since he has been home.  He states his blood pressures been good.  Hypotension  Symptoms: no chest pain, no cough and no fatigue       Course During Hospital Stay: He was admitted and given IV fluids.  Number metoprolol was decreased and Birr Kubo was discontinued.  Entresto was continued.  He is to follow-up with the heart failure clinic.     The following portions of the patient's history were reviewed and updated as appropriate: allergies, current medications, past family history, past medical history, past social history, past surgical history, and problem list.    Review of Systems   Constitutional:  Negative for fatigue.   Respiratory:  Negative for cough, shortness of breath and wheezing.    Cardiovascular:  Negative for  "chest pain and palpitations.   Neurological:  Negative for syncope.       Objective   BP 96/70 (BP Location: Right arm, Patient Position: Sitting, Cuff Size: Adult)   Pulse 97   Temp 96.1 °F (35.6 °C) (Tympanic)   Resp 16   Ht 198.1 cm (77.99\")   Wt 95.3 kg (210 lb)   SpO2 96%   BMI 24.27 kg/m²   Physical Exam  Vitals reviewed.   Constitutional:       General: He is not in acute distress.     Appearance: He is well-developed. He is not diaphoretic.   HENT:      Head: Normocephalic and atraumatic.   Cardiovascular:      Rate and Rhythm: Normal rate and regular rhythm.      Heart sounds: Normal heart sounds. No murmur heard.     No friction rub. No gallop.   Pulmonary:      Effort: Pulmonary effort is normal. No respiratory distress.      Breath sounds: Normal breath sounds.   Abdominal:      General: Bowel sounds are normal. There is no distension.      Palpations: Abdomen is soft.      Tenderness: There is no abdominal tenderness.   Skin:     General: Skin is warm and dry.   Neurological:      Mental Status: He is alert and oriented to person, place, and time.   Psychiatric:         Behavior: Behavior normal.         Thought Content: Thought content normal.         Judgment: Judgment normal.         Assessment & Plan   Problems Addressed this Visit       Hypotension - Primary     Diagnoses         Codes Comments      Hypotension, unspecified hypotension type    -  Primary ICD-10-CM: I95.9  ICD-9-CM: 458.9           Plan: Hospital records reviewed.  Continue current medications.  Keep appointment with specialist.  Follow-up in 3 months.      This document electronically signed by Adali MCQUEEN. July 10, 2025 14:03 EDT                 "

## 2025-07-10 NOTE — PROGRESS NOTES
"Subjective   Anshul Andrews is a 70 y.o. male.     Chief Complaint   Patient presents with    Hospital Follow Up Visit       History of Present Illness     The following portions of the patient's history were reviewed and updated as appropriate: allergies, current medications, past family history, past medical history, past social history, past surgical history and problem list.    Review of Systems    Objective     Pulse 97   Temp 96.1 °F (35.6 °C) (Tympanic)   Resp 16   Ht 198.1 cm (77.99\")   Wt 95.3 kg (210 lb)   SpO2 96%   BMI 24.27 kg/m²     Physical Exam    Current Outpatient Medications   Medication Sig Dispense Refill    albuterol sulfate  (90 Base) MCG/ACT inhaler Inhale 2 puffs Every 4 (Four) Hours As Needed for Wheezing. 8 g 2    apixaban (Eliquis) 5 MG tablet tablet Take 1 tablet by mouth Every 12 (Twelve) Hours. 180 tablet 2    atorvastatin (LIPITOR) 80 MG tablet Take 1 tablet by mouth Daily. 90 tablet 2    B Complex Vitamins (vitamin b complex) capsule capsule Take 1 capsule by mouth Daily.      empagliflozin (JARDIANCE) 10 MG tablet tablet Take 1 tablet by mouth Daily. 90 tablet 1    Fluticasone Furoate-Vilanterol (Breo Ellipta) 200-25 MCG/ACT inhaler Inhale 1 puff Daily. 30 each 11    furosemide (Lasix) 20 MG tablet Take 1 tablet by mouth Daily As Needed (Leg swelling). 30 tablet 0    metoprolol succinate XL (TOPROL-XL) 25 MG 24 hr tablet Take 1 tablet by mouth Every Night for 30 days. 30 tablet 0    sacubitril-valsartan (Entresto) 24-26 MG tablet Take 1 tablet by mouth 2 (Two) Times a Day. 180 tablet 1     No current facility-administered medications for this visit.            Assessment & Plan     Problem List Items Addressed This Visit    None      No diagnosis found.    {PHQ-9 Score:66904:::1}    @Body mass index is 24.27 kg/m².     Anshul Andrews  reports that he has been smoking cigarettes. He started smoking about 25 years ago. He has a 25.3 pack-year smoking history. He has " "been exposed to tobacco smoke. He has never used smokeless tobacco. I have educated him on the risk of diseases from using tobacco products such as {Tobacco Cessation Diseases:55967::\"cancer\",\"COPD\",\"heart disease\"}.     I advised him to quit and he is {Willing/Not Willing to Quit Tobacco Products:19771}.    I spent {Time Spent Tobacco :01642} minutes counseling the patient.           Understands disease processes and need for medications.  Understands reasons for urgent and emergent care.  Patient (& family) verbalized agreement for treatment plan.   Emotional support and active listening provided.  Patient provided time to verbalize feelings.           BMI is within normal parameters. No other follow-up for BMI required.      This document has been electronically signed by CHARISSE Trujillo   July 10, 2025 13:39 EDT  "

## 2025-07-18 ENCOUNTER — READMISSION MANAGEMENT (OUTPATIENT)
Dept: CALL CENTER | Facility: HOSPITAL | Age: 70
End: 2025-07-18
Payer: MEDICARE

## 2025-07-18 NOTE — OUTREACH NOTE
Medical Week 2 Survey      Flowsheet Row Responses   [facility] patient discharged from? [facility]   Does the patient have one of the following disease processes/diagnoses(primary or secondary)? Other   Week 2 attempt successful? Yes   Call start time 1630   Discharge diagnosis Hypotension, presyncope   Call end time 1632   Is patient permission given to speak with other caregiver? Yes   List who call center can speak with Son   Person spoke with today (if not patient) and relationship Son   Medication comments Patient's son is unsure about pt's meds.RN suggested pt refer to AVS to review.   Does the patient have a primary care provider?  Yes   Does the patient have an appointment with their PCP within 7 days of discharge? Yes   Has the patient kept scheduled appointments due by today? No   Nursing Interventions Advised patient to keep appointment   What is the patient's perception of their health status since discharge? Improving   Is the patient/caregiver able to teach back signs and symptoms related to disease process for when to call PCP? Yes   Week 2 Call Completed? Yes   Call end time 1632            Louann LAWSON - Registered Nurse

## (undated) DEVICE — PULSED FIELD ABLATION CATHETER: Brand: FARAWAVE™

## (undated) DEVICE — ST EXT IV SMRTSTE 2VLV FIX M LL 6ML 41

## (undated) DEVICE — DOME MONITORING W BONDED STPCK BIOTRANS2

## (undated) DEVICE — Device

## (undated) DEVICE — KT MANIFLD EP

## (undated) DEVICE — SYS CLS VASC/VENI VASCADE MVP 6TO12F

## (undated) DEVICE — GLIDESHEATH SLENDER STAINLESS STEEL KIT: Brand: GLIDESHEATH SLENDER

## (undated) DEVICE — TR BAND RADIAL ARTERY COMPRESSION DEVICE: Brand: TR BAND

## (undated) DEVICE — PINNACLE INTRODUCER SHEATH: Brand: PINNACLE

## (undated) DEVICE — STERILE (15.2 TAPERED TO 7.6 X 183CM) POLYETHYLENE ACCORDION-FOLDED COVER FOR USE WITH SIEMENS ACUNAV ULTRASOUND CATHETER FAMILY CONNECTOR: Brand: SWIFTLINK TRANSDUCER COVER

## (undated) DEVICE — ST INF PRI SMRTSTE 20DRP 2VLV 24ML 117

## (undated) DEVICE — CATH F5 INF JL 3.5 100CM: Brand: INFINITI

## (undated) DEVICE — TBG PRESS MON 48IN M/F L/L: Brand: MEDLINE INDUSTRIES, INC.

## (undated) DEVICE — KT VLV HEMO MAP ACC PLS LG/BORE MTL/INTRO W/TORQ/DEV

## (undated) DEVICE — LN FLTR ORL/NASL MICROSTREAM NONINTUB A/LNG

## (undated) DEVICE — PAD, DEFIB, ADULT, RADIOTRANS, ZOLL: Brand: MEDLINE

## (undated) DEVICE — ADULT DISPOSABLE SINGLE-PATIENT USE PULSE OXIMETER SENSOR: Brand: NONIN

## (undated) DEVICE — PRESSURE MONITORING SET: Brand: TRUWAVE

## (undated) DEVICE — ADULT, W/LG. BACK PAD, RADIOTRANSPARENT ELEMENT AND LEAD WIRE COMPATIBLE W/: Brand: DEFIBRILLATION ELECTRODES

## (undated) DEVICE — DRSNG SURESITE WNDW 4X4.5

## (undated) DEVICE — Device: Brand: OMNIWIRE PRESSURE GUIDE WIRE

## (undated) DEVICE — GW INQW FIX/CORE PTFE J/3MM .035 260CM

## (undated) DEVICE — GC 5F 056 XB 3.5 STAND TIP: Brand: CORDIS

## (undated) DEVICE — DRSNG SURESITE123 4X4.8IN

## (undated) DEVICE — KT ELECTRD EP SURF ENSITE/X ADHS/PTCH 1P/U NS

## (undated) DEVICE — CATH F5 INF JR 4 100CM: Brand: INFINITI

## (undated) DEVICE — NDL PERC 1PART ECHOTIP WO/BASEPLT 18G 7CM

## (undated) DEVICE — CATH MAP CARD ADVISOR/HD GRIDX SENSOR/ENABLED STEER FLX STRL

## (undated) DEVICE — CATH ULTRASND ECHO ACUNAV FOR GE VIVID1 8F 90CM

## (undated) DEVICE — PK CATH CARD 70

## (undated) DEVICE — SOL NACL 0.9PCT 1000ML

## (undated) DEVICE — A2000 MULTI-USE SYRINGE KIT, P/N 701277-003KIT CONTENTS: 100ML CONTRAST RESERVOIR AND TUBING WITH CONTRAST SPIKE AND CLAMP: Brand: A2000 MULTI-USE SYRINGE KIT

## (undated) DEVICE — RADIAL RUNWAY TOP PADS: Brand: RADIAL RUNWAY TOP PADS

## (undated) DEVICE — DRAPE, RADIAL, STERILE: Brand: MEDLINE

## (undated) DEVICE — ACUMEN IQ CUFF ADULT: Brand: ACUMEN IQ CUFF ADULT

## (undated) DEVICE — Device: Brand: MEDEX

## (undated) DEVICE — MODEL AT P65, P/N 701554-001KIT CONTENTS: HAND CONTROLLER, 3-WAY HIGH-PRESSURE STOPCOCK WITH ROTATING END AND PREMIUM HIGH-PRESSURE TUBING: Brand: ANGIOTOUCH® KIT

## (undated) DEVICE — LEX ELECTRO PHYSIOLOGY: Brand: MEDLINE INDUSTRIES, INC.

## (undated) DEVICE — Device: Brand: WEBSTER CS

## (undated) DEVICE — SYS CLS VASC/VENI VASCADE/MVP 10TO12F XL

## (undated) DEVICE — DECANT BG O JET

## (undated) DEVICE — 1 X VERSACROSS CONNECT TRANSSEPTAL DILATOR;  1 X VERSACROSS RF WIRE (INCLUDING 1 X CONNECTOR CABLE (SINGLE USE)): Brand: VERSACROSS CONNECT ACCESS SOLUTION FOR FARADRIVE

## (undated) DEVICE — ST EXT IV SMARTSITE PINCH/CLMP 5ML 46CM

## (undated) DEVICE — SET PRIMARY GRVTY 10DP MALE LL 104IN

## (undated) DEVICE — LN INJ CONTRST FLXCIL HP F/M LL 1200PSI10

## (undated) DEVICE — ELECTRD RETRN/GRND MEGADYNE SGL/PLT W/CORD 9FT DISP